# Patient Record
Sex: FEMALE | Race: BLACK OR AFRICAN AMERICAN
[De-identification: names, ages, dates, MRNs, and addresses within clinical notes are randomized per-mention and may not be internally consistent; named-entity substitution may affect disease eponyms.]

---

## 2018-03-25 ENCOUNTER — HOSPITAL ENCOUNTER (OUTPATIENT)
Dept: HOSPITAL 76 - ED | Age: 53
Setting detail: OBSERVATION
LOS: 1 days | Discharge: HOME | End: 2018-03-26
Attending: INTERNAL MEDICINE | Admitting: SPECIALIST
Payer: SELF-PAY

## 2018-03-25 DIAGNOSIS — E78.5: ICD-10-CM

## 2018-03-25 DIAGNOSIS — Z83.3: ICD-10-CM

## 2018-03-25 DIAGNOSIS — Z82.49: ICD-10-CM

## 2018-03-25 DIAGNOSIS — I11.9: ICD-10-CM

## 2018-03-25 DIAGNOSIS — E11.65: ICD-10-CM

## 2018-03-25 DIAGNOSIS — R07.2: Primary | ICD-10-CM

## 2018-03-25 DIAGNOSIS — E11.42: ICD-10-CM

## 2018-03-25 LAB
ALBUMIN DIAFP-MCNC: 4 G/DL (ref 3.2–5.5)
ALBUMIN/GLOB SERPL: 1.1 {RATIO} (ref 1–2.2)
ALP SERPL-CCNC: 112 IU/L (ref 42–121)
ALT SERPL W P-5'-P-CCNC: 19 IU/L (ref 10–60)
ANION GAP SERPL CALCULATED.4IONS-SCNC: 11 MMOL/L (ref 6–13)
AST SERPL W P-5'-P-CCNC: 17 IU/L (ref 10–42)
BASOPHILS # BLD MANUAL: 0 10^3/UL (ref 0–0.1)
BASOPHILS NFR BLD AUTO: 0.8 %
BILIRUB BLD-MCNC: 0.3 MG/DL (ref 0.2–1)
BUN SERPL-MCNC: 14 MG/DL (ref 6–20)
CALCIUM UR-MCNC: 9.9 MG/DL (ref 8.5–10.3)
CHLORIDE SERPL-SCNC: 94 MMOL/L (ref 101–111)
CK MB SERPL-MCNC: 1.2 NG/ML (ref 0.6–6.3)
CK SERPL-CCNC: 81 IU/L (ref 22–269)
CO2 SERPL-SCNC: 29 MMOL/L (ref 21–32)
CREAT SERPLBLD-SCNC: 0.7 MG/DL (ref 0.4–1)
EOSINOPHIL # BLD MANUAL: 0.1 10^3/UL (ref 0–0.7)
EOSINOPHIL NFR BLD AUTO: 2.8 %
ERYTHROCYTE [DISTWIDTH] IN BLOOD BY AUTOMATED COUNT: 13.3 % (ref 12–15)
EST. AVERAGE GLUCOSE BLD GHB EST-MCNC: 361 MG/DL (ref 70–100)
GFRSERPLBLD MDRD-ARVRAT: 87 ML/MIN/{1.73_M2} (ref 89–?)
GLOBULIN SER-MCNC: 3.7 G/DL (ref 2.1–4.2)
GLUCOSE SERPL-MCNC: 459 MG/DL (ref 70–100)
HB2 TOTAL: 14 G/DL
HBA1C BLD-MCNC: 1.85 G/DL
HEMOGLOBIN A1C %: 14.2 % (ref 4.6–6.2)
HGB UR QL STRIP: 13.1 G/DL (ref 12–16)
LIPASE SERPL-CCNC: 57 U/L (ref 22–51)
LYMPH ABN NFR BLD MANUAL: 0 %
LYMPHOBLASTS # BLD: 26 %
LYMPHOCYTES # BLD MANUAL: 3 10^3/UL (ref 1.5–3.5)
LYMPHOCYTES NFR BLD AUTO: 32.1 %
MCH RBC QN AUTO: 29.3 PG (ref 27–31)
MCHC RBC AUTO-ENTMCNC: 33.6 G/DL (ref 32–36)
MCV RBC AUTO: 87.1 FL (ref 81–99)
MONOCYTES # BLD MANUAL: 0.3 10^3/UL (ref 0–1)
MONOCYTES NFR BLD AUTO: 5 %
NEUTROPHILS # SNV AUTO: 9.1 X10^3/UL (ref 4.8–10.8)
NEUTROPHILS NFR BLD AUTO: 59.3 %
NEUTROPHILS NFR BLD MANUAL: 5.7 10^3/UL (ref 1.5–6.6)
NEUTS BAND NFR BLD MANUAL: 63 %
NEUTS BAND NFR BLD: 0 %
PDW BLD AUTO: 8.4 FL (ref 7.9–10.8)
PLAT MORPH BLD: (no result)
PLATELET # BLD: 270 10^3/UL (ref 130–450)
PLATELET BLD QL SMEAR: (no result)
PROT SPEC-MCNC: 7.7 G/DL (ref 6.7–8.2)
RBC MAR: 4.46 10^6/UL (ref 4.2–5.4)
RBC MORPH BLD: (no result)
SODIUM SERPLBLD-SCNC: 134 MMOL/L (ref 135–145)
TROPONIN I SERPL-MCNC: 0.17 NG/ML (ref ?–0.49)

## 2018-03-25 PROCEDURE — 85025 COMPLETE CBC W/AUTO DIFF WBC: CPT

## 2018-03-25 PROCEDURE — 83690 ASSAY OF LIPASE: CPT

## 2018-03-25 PROCEDURE — 71275 CT ANGIOGRAPHY CHEST: CPT

## 2018-03-25 PROCEDURE — 84484 ASSAY OF TROPONIN QUANT: CPT

## 2018-03-25 PROCEDURE — 99285 EMERGENCY DEPT VISIT HI MDM: CPT

## 2018-03-25 PROCEDURE — 82553 CREATINE MB FRACTION: CPT

## 2018-03-25 PROCEDURE — 93005 ELECTROCARDIOGRAM TRACING: CPT

## 2018-03-25 PROCEDURE — 93017 CV STRESS TEST TRACING ONLY: CPT

## 2018-03-25 PROCEDURE — 96374 THER/PROPH/DIAG INJ IV PUSH: CPT

## 2018-03-25 PROCEDURE — 80061 LIPID PANEL: CPT

## 2018-03-25 PROCEDURE — 80048 BASIC METABOLIC PNL TOTAL CA: CPT

## 2018-03-25 PROCEDURE — 83721 ASSAY OF BLOOD LIPOPROTEIN: CPT

## 2018-03-25 PROCEDURE — 96376 TX/PRO/DX INJ SAME DRUG ADON: CPT

## 2018-03-25 PROCEDURE — 99284 EMERGENCY DEPT VISIT MOD MDM: CPT

## 2018-03-25 PROCEDURE — 78452 HT MUSCLE IMAGE SPECT MULT: CPT

## 2018-03-25 PROCEDURE — 82550 ASSAY OF CK (CPK): CPT

## 2018-03-25 PROCEDURE — 96361 HYDRATE IV INFUSION ADD-ON: CPT

## 2018-03-25 PROCEDURE — 96375 TX/PRO/DX INJ NEW DRUG ADDON: CPT

## 2018-03-25 PROCEDURE — 83036 HEMOGLOBIN GLYCOSYLATED A1C: CPT

## 2018-03-25 PROCEDURE — 74174 CTA ABD&PLVS W/CONTRAST: CPT

## 2018-03-25 PROCEDURE — 80053 COMPREHEN METABOLIC PANEL: CPT

## 2018-03-25 PROCEDURE — 36415 COLL VENOUS BLD VENIPUNCTURE: CPT

## 2018-03-25 RX ADMIN — INSULIN GLARGINE SCH UNIT: 100 INJECTION, SOLUTION SUBCUTANEOUS at 21:54

## 2018-03-25 RX ADMIN — INSULIN GLARGINE SCH: 100 INJECTION, SOLUTION SUBCUTANEOUS at 22:04

## 2018-03-25 RX ADMIN — INSULIN ASPART SCH UNIT: 100 INJECTION, SOLUTION INTRAVENOUS; SUBCUTANEOUS at 21:57

## 2018-03-25 RX ADMIN — SODIUM CHLORIDE SCH MLS/HR: 9 INJECTION, SOLUTION INTRAVENOUS at 21:02

## 2018-03-25 NOTE — CT REPORT
EXAM:

CT ANGIOGRAM CHEST

 

EXAM DATE: 3/25/2018 06:45 PM.

 

CLINICAL HISTORY: Chest pain.

 

COMPARISON: None.

 

TECHNIQUE: Routine helical imaging was performed through the chest in the arterial phase. IV Contrast
: 100 cc Isovue 300. Reconstructions: Coronal 3-D MIP reconstructions.Sagittal and coronal.

 

In accordance with CT protocol optimization, one or more of the following dose reduction techniques w
ere utilized for this exam: automated exposure control, adjustment of mA and/or KV based on patient s
ize, or use of iterative reconstructive technique.

 

FINDINGS: 

Thoracic Aorta: The proximal great vessels are unremarkable. Thoracic aorta is normal in caliber with
out evidence of aneurysm or dissection. No periaortic hematoma. Pulmonary artery unremarkable save fo
r some slight air in the pulmonary artery, presumed introduced from injection.

 

Lungs/pleura: No consolidation, nodules, or edema. No effusions or pneumothorax.

 

Mediastinum: The thyroid gland is unremarkable. There are no enlarged mediastinal lymph nodes. No per
icardial effusion. 

 

Other: None.

 

IMPRESSION: Unremarkable CTA chest. No evidence of aneurysm or dissection.

 

[facility]

Referring Provider Line: [phone]

 

SITE ID: [id number]

## 2018-03-25 NOTE — CT REPORT
EXAM:

CT ANGIOGRAM ABDOMEN AND PELVIS WITH CONTRAST

 

EXAM DATE: 3/25/2018 07:13 PM.

 

CLINICAL HISTORY: Chest pain, murmur.

 

COMPARISONS: None.

 

TECHNIQUE: Routine helical CT angiogram imaging was performed through the abdomen and pelvis in the a
rterial phase. IV contrast: 100 cc Isovue-300. Enteric contrast: No. Reconstructions: Coronal, sagitt
al, and 3D MIP reconstructions.

 

In accordance with CT protocol optimization, one or more of the following dose reduction techniques w
ere utilized for this exam: automated exposure control, adjustment of mA and/or KV based on patient s
ize, or use of iterative reconstructive technique.

 

FINDINGS: 

Vasculature: Normal. No aneurysm, dissection, or significant atherosclerotic disease of the abdominal
 aorta and iliac arteries. The visualized mesenteric and solid organ vascular structures are also wit
hin normal limits.

 

Abdominal Solid Organs: Liver is normal in contour without focal intrahepatic lesion. The gallbladder
 is contracted and unremarkable. The spleen and pancreas are within normal limits. Adrenal glands unr
emarkable. Symmetric renal enhancement with mild right pelviectasis. Left renal cyst measuring 12 mm.


 

Peritoneal Cavity: The stomach is unremarkable. There are no dilated loops of large or small intestin
e. No focal inflammation. No enlarged mesenteric lymph nodes. 

 

Pelvic Organs: Moderate distention of the bladder. Uterus and adnexa unremarkable.

 

Bones: No significant abnormality.

 

Other: None.

 

IMPRESSION: 

1. No evidence of abdominal aortic aneurysm or dissection. 

2. Essentially unremarkable CTA of the abdomen and pelvis.

 

[facility]

Referring Provider Line: [phone]

 

SITE ID: [id number]

## 2018-03-25 NOTE — HISTORY & PHYSICAL EXAMINATION
Chief Complaint





- Chief Complaint


Chief Complaint: chest pain and burning feet





History of Present Illness





- Admitted From


Admitted From:: ER/Home





- History Obtained From


Records Reviewed: [facility]


History obtained from: Patient, Her , and Dr. Lobo


Exam Limitations: none





- History of Present Illness


HPI Comment/Other: 


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 

is a traveling nurse.  She has been on [facility] about 3 or 4 weeks, 

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  

[place]  She [place] not have a primary care provider.  The 

last time she was seen by physician was in the 2017, she thinks it was 

October.





3 years ago, she had already lost 60-70 pounds in an attempt to control her 

blood pressure and her diabetes.  At that time she was on hydrochlorothiazide, 

metformin, and potassium.  With the weight loss, her A1c became normal, and her 

medications were stopped.  Her sugars have been checked only on a yearly basis 

with her physician, and her last 2017 glucose was normal.





In the last few weeks she has developed a burning dysesthesia of her feet.  As 

a nurse, she knew she was dealing with peripheral neuropathy.  She did not 

lengthen neuropathy with her diabetes.  She has not had polyuria, polyphagia, 

polydipsia.





She started developing some left arm aching yesterday.  And today, 45 minutes 

before coming to the hospital, she developed substernal chest pressure.  It was 

nonradiating.  There was no shortness of breath, nausea, diaphoresis, or 

dizziness.  No palpitations.  She did have an episode of chest pain 3 years 

ago.  She was observed overnight, but no stress test was done.





She came to the emergency room and was noted to be hypertensive at 210 

systolic.  She received metoprolol 25 mg p.o., after receiving metoprolol 5 mg 

once.  Her systolic went from 215/110 diastolic to 167/93.  Chest pressure was 

gone by the time she got to the emergency room.  Left arm ache was gone.  Her 

initial troponin was 0.17.  Repeat troponin an hour later was 0.15.  Random 

glucose was 459.  A1c is 14.2%.





She is now placed in observation for rule out MI.  She has significant risk 

factors including hyperlipidemia, hypertension, diabetes, all of which are 

uncontrolled.  She is not a smoker.  She does have a family history with mom 

and dad both having cardiac issues and one brother dying of a massive heart 

attack.  She is in nondrinker as well. 








History





- Past Medical History


Cardiovascular: reports: Hypertension, High cholesterol, Other (chest pain 3 

years ago with overnight stay in hospital but no ETT)


Respiratory: reports: None


Neuro: reports: None


Endocrine/Autoimmune: reports: Type 2 diabetes


GI: reports: None (never had a colonoscopy)


GYN: reports: Other (-0-1-3)


: reports: None


HEENT: reports: Other (wears glasses)


Psych: reports: None


Musculoskeletal: reports: None


Derm: reports: None


MRSA Hx?: No





- Family & Social History


Family History Comment/Other: Dad and Mom both  in their late 60's. They 

had DM,HTN, chol, CAD at their deaths. She had 18 siblings. 3 sisters have  

of cancer; 1 was breast but the other two she doesn't know what kind of cancer. 

1 brother  of GSW. 1 brother  of MI. Her 3 children are healthy


Living arrangement: At home


Living Situation: With spouse/s.o.


Social History Notes: she was [place] in [facility] and has lived all over the 

[facility] bc of work and family. She has been  to her 2nd  for 17 

years. They met at her cousins house one night for dinner on a blind date and 

have been together even since. Her 3 children live in [place] [facility]. She never 

smoked, and never really even socially drank since she doesn't like the taste 

of it. Nurse for 30 years now and she and her  do 3-4 months at a time 

in different locations. [place] went [place] and now plan on 

being in [place]





- Substance History


Use: Uses substance without health or social issues: NONE


Abuse: Recurrent use of substance despite neg consequences: NONE


Dependence: Experiences withdrawal or developed tolerances: NONE





- POLST


Patient has POLST: No


POLST Status: Full Code





Meds/Allgy





- Home Medications


Home Medications: 


 Ambulatory Orders











 Medication  Instructions  Recorded  Confirmed


 


Aspirin  18 














- Allergies


Allergies/Adverse Reactions: 


 Allergies











Allergy/AdvReac Type Severity Reaction Status Date / Time


 


No Known Drug Allergies Allergy   Verified 18 17:54














Review of Systems





- Constitutional


Constitutional: denies: Fatigue, Fever, Chills, Malaise, Weight gain, Weight 

loss





- Eyes


Eyes: denies: Pain, Irritation, Blurred vision, Spots in vision, Field loss





- Ears, Nose & Throat


Ears, Nose & Throat: denies: Ear pain, Hearing loss, Hearing aids, Tinnitus, 

Vertigo, Nasal congestion, Mouth lesions





- Cardiovascular


Cariovascular: reports: Chest pain.  denies: Irregular heart rate, Palpitations

, Edema, Lightheadedness, Syncope, Exertional dyspnea, Decr. exercise tolerance





- Respiratory


Respiratory: denies: Cough, Sputum production, Wheezing, Snoring





- Gastrointestinal


Gastrointestinal: reports: Other (has never had a colonscopy).  denies: 

Abdominal pain, Abdominal distention, Constipation, Diarrhea, Change in bowel 

habits





- Genitourinary


Genitourinary: denies: Dysuria, Frequency, Urgency, Hematuria





- Musculoskeletal


Musculoskeletal: reports: Other (" I dance on tables and feel great!").  denies

: Muscle pain, Back pain, Muscle aches, Stiffness





- Integumentary


Integumentary: denies: Rash, Pruritis, Lesions, Dryness





- Neurological


Neurological: reports: Numbness (of feet these last few weeks).  denies: 

General weakness, Focal weakness, Headache, Dizziness, Abnormal gait





- Psychiatric


Psychiatric: denies: Depression, Anxiety, Suicidal





- Endocrine


Endocrine: denies: Polyuria, Polydypsia, Polyphagia, Intolerance to cold





- Hematologic/Lymphatic


Hematologic/Lymphatic: denies: Anemia, Bruising, Petechiae





Exam





- Vital Signs


Reviewed Vital Signs: Yes


Vital Signs: 





 Vital Signs x48h











  Temp Pulse Pulse Resp BP BP Pulse Ox


 


 18 20:54  37 C   63    179/86 H  100


 


 18 20:05   65   14  158/81 H   100


 


 18 19:53   78   18  167/93 H   100


 


 18 18:27   77   18  174/94 H   99


 


 18 17:50  37.1 C  101 H   17  215/110 H   98














- Physical Exam


General Appearance: positive: No acute distress, Alert, Other (Cheerful, black 

female who looks younger than stated age and  at the bedside)


Eyes Bilateral: positive: PERRL, EOMI


ENT: positive: Pharynx nml


Neck: positive: No JVD.  negative: Stiff neck, Carotid bruit


Respiratory: positive: Chest non-tender.  negative: Wheezes, Rales, Rhonchi


Cardiovascular: positive: Regular rate & rhythm.  negative: Systolic murmur, 

Gallop/S4, Friction rub


Peripheral Pulses: positive: 2+


Abdomen: positive: Non-tender, No organomegaly, Nml bowel sounds, No distention


Skin: positive: Warm, Dry.  negative: Diaphoresis


Extremities: positive: Non-tender, Full ROM, No pedal edema


Neurologic/Psychiatric: positive: Oriented x3, CN's nml (2-12), Motor nml, 

Sensation nml, Mood/affect nml





Conclusion/Plan





- Problem List


(1) Chest pain


Conclusion/Plan: 


In a patient who has all the risk factors for coronary artery disease: Diabetes

, hypertension, hyperlipidemia, family history.  First set of troponins show 

enzyme leak.  May be from the severe hypertension.





Plan:


Place in observation


Cardiac enzymes every 6 hours 2 more sets


ECHO in am


Check fasting lipid panel and start on a statin


Aspirin daily


Add lisinopril for blood pressure control


Lexiscan stress test tomorrow a.m.


If her next set of troponins are higher, I did explain that I may be calling 

[facility] for possible transfer.  She and her  are both 

aware.


Subungual nitroglycerin as needed chest pain with morphine as needed chest pain


Qualifiers: 


   Chest pain type: unspecified   Qualified Code(s): R07.9 - Chest pain, 

unspecified   





(2) Uncontrolled type 2 diabetes mellitus with complication, without long-term 

current use of insulin


Conclusion/Plan: 


she really wants to do oral meds. But I suspect with a glucose of >400 and an 

A1c that is this high oral meds alone won't get her to her goal of <7% A1c. 


I will start lantus and SS novolog but will also start amaryl. 


she will need to establish herself w a PCP and then negotiate what her meds 

will be but for now it's insulin + oral meds.


already has complication of peripheral neuropathy.


will check TSH in case. 








(3) Essential hypertension with goal blood pressure less than 130/80


Conclusion/Plan: 


she will be started on lisinopril bc of the DM. She may end of needing two 

meds. 


I don't know if her previous PCP ever looks for renal artery hyperplasia 


that can be done as an outpt. 


Again, will need to be seen by a new PCP in the next 1-2 weeks after discharge. 








- Lab Results


Lab results reviewed: Yes


Fish Bones: 


 18 18:06





 18 18:06





- Diagnostic Imaging Results


Diagnostic Imaging Results: positive: Final report reviewed





- EKG Results


EKG Interpreted Independently: Yes





Core Measures





- Anticipated LOS


I expect patient to be DC'd or transferred within 96 hours.: Yes





- DVT/VTE - Prophylaxis


VTE/DVT Device ordered at admit?: Yes





- AMI - Statin at Admit


Aspirin Prescribed on Admit: Yes

## 2018-03-25 NOTE — ED PHYSICIAN DOCUMENTATION
PD HPI CHEST PAIN





- Stated complaint


Stated Complaint: CP/FEET HAND NUMB





- Chief complaint


Chief Complaint: Cardiac





- History obtained from


History obtained from: Patient





- History of Present Illness


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 

somewhat painful, over the last day she has had in the left arm as well and 

about 45 minutes ago developed moderate intensity substernal chest pressure 

which is nonradiating and not associated with shortness of breath, dizziness, 

sweats, or nausea but she does have a diminished appetite.  She has no personal 

Of coronary disease or other issues.  She has had elevated blood pressures 

before at times intermittently but usually it is actually on the low side.)





- Additional information


Additional information: 





Several years ago she was on metformin and hydrochlorothiazide but lost weight 

and did not need to be on them anymore.





Review of Systems


Ten Systems: 10 systems reviewed and negative


Constitutional: denies: Fever, Chills


Cardiac: reports: Chest pain / pressure.  denies: Palpitations, Pedal edema, 

Calf pain


Respiratory: denies: Dyspnea, Cough


GI: denies: Abdominal Pain, Nausea, Vomiting





PD PAST MEDICAL HISTORY





- Past Medical History


Past Medical History: No





- Past Surgical History


Past Surgical History: No





- Present Medications


Home Medications: 


 Ambulatory Orders











 Medication  Instructions  Recorded  Confirmed


 


Aspirin  03/25/18 














- Allergies


Allergies/Adverse Reactions: 


 Allergies











Allergy/AdvReac Type Severity Reaction Status Date / Time


 


No Known Drug Allergies Allergy   Verified 03/25/18 17:54














- Social History


Does the pt smoke?: No


Smoking Status: Never smoker





- Family History


Family history: reports: DM (and HTN)





PD ED PE NORMAL





- Vitals


Vital signs reviewed: Yes





- General


General: Alert and oriented X 3, No acute distress





- HEENT


HEENT: PERRL, EOMI





- Neck


Neck: Supple, no meningeal sign, No bony TTP





- Cardiac


Cardiac: RRR, Other (2 out of 6 decrescendo holosystolic murmur heard best at 

the left upper sternal border)





- Respiratory


Respiratory: No respiratory distress, Clear bilaterally





- Abdomen


Abdomen: Normal bowel sounds, Soft, Non tender





- Derm


Derm: Normal color, Warm and dry





- Extremities


Extremities: No edema, No calf tenderness / cord, Other (Equal and strong pedal 

and radial pulses bilaterally)





- Neuro


Neuro: Alert and oriented X 3, Normal speech





- Psych


Psych: Normal mood, Normal affect





Results





- Vitals


Vitals: 


 Vital Signs - 24 hr











  03/25/18 03/25/18





  17:50 18:27


 


Temperature 37.1 C 


 


Heart Rate 101 H 77


 


Respiratory 17 18





Rate  


 


Blood Pressure 215/110 H 174/94 H


 


O2 Saturation 98 99








 Oxygen











O2 Source                      Room air

















- EKG (time done)


  ** 1754


Rate: Rate (enter#) (90)


Rhythm: NSR


Axis: Normal


QRS: LVH


Ischemia: Normal ST segments


Computer interpretation: Agree with computer





- Labs


Labs: 


 Laboratory Tests











  03/25/18 03/25/18 03/25/18





  18:06 18:06 18:06


 


WBC  9.1  


 


RBC  4.46  


 


Hgb  13.1  


 


Hct  38.9  


 


MCV  87.1  


 


MCH  29.3  


 


MCHC  33.6  


 


RDW  13.3  


 


Plt Count  270  


 


MPV  8.4  


 


Neut #  Not Reportable  


 


Lymph #  Not Reportable  


 


Mono #  Not Reportable  


 


Eos #  Not Reportable  


 


Baso #  Not Reportable  


 


Absolute Nucleated RBC  Not Reportable  


 


Total Counted  100  


 


Band Neuts % (Manual)  0  


 


Reactive Lymphs % (Man)  7  


 


Abnorm Lymph % (Manual)  0  


 


Nucleated RBC %  Not Reportable  


 


Neutrophils # (Manual)  5.7  


 


Lymphocytes # (Manual)  3.0  


 


Monocytes # (Manual)  0.3  


 


Eosinophils # (Manual)  0.1  


 


Basophils # (Manual)  0.0  


 


Manual Slide Review  Indicated  


 


Platelet Estimate  NORMAL (130-450,000)  


 


Platelet Morphology  NORMAL APPEARANCE  


 


RBC Morph Micro Appear  NORMAL APPEARANCE  


 


Sodium   134 L 


 


Potassium   3.8 


 


Chloride   94 L 


 


Carbon Dioxide   29 


 


Anion Gap   11.0 


 


BUN   14 


 


Creatinine   0.7 


 


Estimated GFR (MDRD)   87 L 


 


Glucose   459 H 


 


Calcium   9.9 


 


Total Bilirubin   0.3 


 


AST   17 


 


ALT   19 


 


Alkaline Phosphatase   112 


 


Total Creatine Kinase   81 


 


CK-MB (CK-2)    1.2


 


Troponin I    0.17


 


Total Protein   7.7 


 


Albumin   4.0 


 


Globulin   3.7 


 


Albumin/Globulin Ratio   1.1 


 


Lipase   57 H 














  03/25/18





  19:10


 


WBC 


 


RBC 


 


Hgb 


 


Hct 


 


MCV 


 


MCH 


 


MCHC 


 


RDW 


 


Plt Count 


 


MPV 


 


Neut # 


 


Lymph # 


 


Mono # 


 


Eos # 


 


Baso # 


 


Absolute Nucleated RBC 


 


Total Counted 


 


Band Neuts % (Manual) 


 


Reactive Lymphs % (Man) 


 


Abnorm Lymph % (Manual) 


 


Nucleated RBC % 


 


Neutrophils # (Manual) 


 


Lymphocytes # (Manual) 


 


Monocytes # (Manual) 


 


Eosinophils # (Manual) 


 


Basophils # (Manual) 


 


Manual Slide Review 


 


Platelet Estimate 


 


Platelet Morphology 


 


RBC Morph Micro Appear 


 


Sodium 


 


Potassium 


 


Chloride 


 


Carbon Dioxide 


 


Anion Gap 


 


BUN 


 


Creatinine 


 


Estimated GFR (MDRD) 


 


Glucose 


 


Calcium 


 


Total Bilirubin 


 


AST 


 


ALT 


 


Alkaline Phosphatase 


 


Total Creatine Kinase 


 


CK-MB (CK-2) 


 


Troponin I  0.15


 


Total Protein 


 


Albumin 


 


Globulin 


 


Albumin/Globulin Ratio 


 


Lipase 














- Rads (name of study)


  ** CT Angio


Radiology: EMP read contemporaneously





PD MEDICAL DECISION MAKING





- ED course


ED course: 





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 

diabetes and hypertension presents with neuropathy in the feet and now acute 

chest pain with a nonischemic EKG but fairly high blood pressure.  Blood 

pressure came down to about 160-170 systolic without specific intervention. She 

was administered aspirin.  Because of the neurologic symptoms and angiogram of 

the chest was done, also the murmur, this was negative.  Her troponin was in 

her intermediate at 0.17 and this was repeated in short order to make sure was 

not shooting up, the second 1 is only 0.15.  Her blood sugar is fairly high as 

well.  She was administered IV fluids, insulin, beta blockers.  She needs a 

more formal rule out, but her troponin anemia may be due to her blood pressure.

  Spoke with Dr. Lutz for observation at 7:51 PM.





Departure





- Departure


Disposition: ED Place in Observation


Clinical Impression: 


 Elevated troponin





Chest pain


Qualifiers:


 Chest pain type: unspecified Qualified Code(s): R07.9 - Chest pain, unspecified





Hypertension


Qualifiers:


 Hypertension type: unspecified Qualified Code(s): I10 - Essential (primary) 

hypertension





Uncontrolled diabetes mellitus


Qualifiers:


 Diabetes mellitus type: type 2 Diabetes mellitus complication status: with 

hyperglycemia Diabetes mellitus long term insulin use: without long term use 

Qualified Code(s): E11.65 - Type 2 diabetes mellitus with hyperglycemia





Condition: Stable

## 2018-03-25 NOTE — CT PRELIMINARY REPORT
Accession: [id number]

Exam: CT ABDOMEN/PELVIS ANGIO

 

IMPRESSION: 

1. No evidence of abdominal aortic aneurysm or dissection. 

2. Essentially unremarkable CTA of the abdomen and pelvis.

 

[facility]

 

SITE ID: [id number]

## 2018-03-26 VITALS — SYSTOLIC BLOOD PRESSURE: 157 MMHG | DIASTOLIC BLOOD PRESSURE: 78 MMHG

## 2018-03-26 LAB
CHOLEST SERPL-MCNC: 227 MG/DL
HDLC SERPL-MCNC: 42 MG/DL
HDLC SERPL: 5.4 {RATIO} (ref ?–4.4)
LDLC SERPL CALC-MCNC: 152 MG/DL
LDLC/HDLC SERPL: 3.6 {RATIO} (ref ?–4.4)
VLDLC SERPL-SCNC: 33 MG/DL

## 2018-03-26 RX ADMIN — SODIUM CHLORIDE SCH MLS/HR: 9 INJECTION, SOLUTION INTRAVENOUS at 14:42

## 2018-03-26 RX ADMIN — ACETAMINOPHEN PRN MG: 325 TABLET ORAL at 07:57

## 2018-03-26 RX ADMIN — SODIUM CHLORIDE, PRESERVATIVE FREE SCH: 5 INJECTION INTRAVENOUS at 08:25

## 2018-03-26 RX ADMIN — INSULIN ASPART SCH UNIT: 100 INJECTION, SOLUTION INTRAVENOUS; SUBCUTANEOUS at 08:19

## 2018-03-26 RX ADMIN — SODIUM CHLORIDE, PRESERVATIVE FREE SCH: 5 INJECTION INTRAVENOUS at 17:17

## 2018-03-26 RX ADMIN — INSULIN ASPART SCH: 100 INJECTION, SOLUTION INTRAVENOUS; SUBCUTANEOUS at 17:17

## 2018-03-26 RX ADMIN — INSULIN ASPART SCH: 100 INJECTION, SOLUTION INTRAVENOUS; SUBCUTANEOUS at 12:26

## 2018-03-26 RX ADMIN — SODIUM CHLORIDE SCH MLS/HR: 9 INJECTION, SOLUTION INTRAVENOUS at 03:47

## 2018-03-26 RX ADMIN — MORPHINE SULFATE PRN MG: 2 INJECTION, SOLUTION INTRAMUSCULAR; INTRAVENOUS at 16:25

## 2018-03-26 RX ADMIN — MORPHINE SULFATE PRN MG: 2 INJECTION, SOLUTION INTRAMUSCULAR; INTRAVENOUS at 00:43

## 2018-03-26 RX ADMIN — ACETAMINOPHEN PRN MG: 325 TABLET ORAL at 16:25

## 2018-03-26 RX ADMIN — SODIUM CHLORIDE, PRESERVATIVE FREE SCH ML: 5 INJECTION INTRAVENOUS at 00:10

## 2018-03-26 NOTE — DISCHARGE SUMMARY
Discharge Summary


Admit Date: 03/25/18


Discharge Date: 03/26/18


Discharging Provider: Steffen Galindo MD


Primary Care Provider: None


Code Status: Attempt Resuscitation


Condition at Discharge: Fair


Discharge Disposition: 01 Home, Self Care





- DIAGNOSES


Admission Diagnoses: 





1.  Chest pain


2.  Uncontrolled type 2 diabetes mellitus with complication, without long-term 

current use of insulin


3.  Essential hypertension with goal blood pressure less than 130/80


Discharge Diagnoses with Status of Each Condition: 





1.  Chest pain: Stable


2.  Uncontrolled type 2 diabetes mellitus with complication, without long-term 

current use of insulin: Uncontrolled


3.  Essential hypertension with goal blood pressure less than 130/80: 

Uncontrolled


4.  Hyperlipidemia: Stable





- HPI


History of Present Illness: 





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 

is a traveling nurse.  She has been on [facility] about 3 or 4 weeks, 

currently contracted with [facility] doing LTAC ventilator nursing management[place]  

[place]  She [place] not have a primary care provider.  The 

last time she was seen by physician was in the fall 2017, she thinks it was 

October.





3 years ago, she had already lost 60-70 pounds in an attempt to control her 

blood pressure and her diabetes.  At that time she was on hydrochlorothiazide, 

metformin, and potassium.  With the weight loss, her A1c became normal, and her 

medications were stopped.  Her sugars have been checked only on a yearly basis 

with her physician, and her last October 2017 glucose was normal.





In the last few weeks she has developed a burning dysesthesia of her feet.  As 

a nurse, she knew she was dealing with peripheral neuropathy.  She did not 

lengthen neuropathy with her diabetes.  She has not had polyuria, polyphagia, 

polydipsia.





She started developing some left arm aching yesterday.  And today, 45 minutes 

before coming to the hospital, she developed substernal chest pressure.  It was 

nonradiating.  There was no shortness of breath, nausea, diaphoresis, or 

dizziness.  No palpitations.  She did have an episode of chest pain 3 years 

ago.  She was observed overnight, but no stress test was done.





She came to the emergency room and was noted to be hypertensive at 210 

systolic.  She received metoprolol 25 mg p.o., after receiving metoprolol 5 mg 

once.  Her systolic went from 215/110 diastolic to 167/93.  Chest pressure was 

gone by the time she got to the emergency room.  Left arm ache was gone.  Her 

initial troponin was 0.17.  Repeat troponin an hour later was 0.15.  Random 

glucose was 459.  A1c is 14.2%.





She is now placed in observation for rule out MI.  She has significant risk 

factors including hyperlipidemia, hypertension, diabetes, all of which are 

uncontrolled.  She is not a smoker.  She does have a family history with mom 

and dad both having cardiac issues and one brother dying of a massive heart 

attack.  She is in nondrinker as well. 











- HOSPITAL COURSE


Hospital Course: 





Patient was admitted with chest pain her initial troponin was 0 0.17 and 

subsequent repeat troponins were 0.15.  The patient's EKG did not show any ST 

elevations or acute ischemic changes did show left ventricular hypertrophy.  On 

presentation patient was found to have uncontrolled hypertension with blood 

pressure of 215/110.  Given the patient's risk factors of diabetes, hypertension

, hyperlipidemia and family history the patient underwent a stress test.  The 

patient underwent a Lexiscan EKG stress test which was inconclusive.  The 

patient did have chest pain during the test however there was no significant 

changes on her EKG.  The patient also underwent a myocardial perfusion scan 

which showed no convincing reversible perfusion deficit to indicate stress-

induced ischemia.  There was no convincing fixed perfusion deficits.  In the 

left ventricular ejection fraction was 61%.  The patient was discharged home 

with a prescription for metformin, glipizide, nifedipine, Cozaar and Lipitor.  

The patient's hemoglobin A1c was significantly elevated at 14.2.  The patient 

however was not ready to start on insulin therefore we did prescribe metformin 

and glipizide.  The patient will follow up with a primary care physician and 

will likely need to be started on insulin in the next couple of months.  The 

patient was given a prescription for a glucometer and glucometer strips.  The 

patient's blood pressure was better controlled when she was placed on blood 

pressure medications.  The patient's LDL was elevated at 152 given her risk 

factors she was placed on Lipitor.  It was stressed to the patient that is very 

important for her to get her medical problems controlled and to be very 

compliant with medications, diet and exercise.  The patient seemed to 

understand this and seemed motivated to do well.





- ALLERGIES


Allergies/Adverse Reactions: 


 Allergies











Allergy/AdvReac Type Severity Reaction Status Date / Time


 


No Known Drug Allergies Allergy   Verified 03/25/18 17:54














- MEDICATIONS


Home Medications: 


 Ambulatory Orders











 Medication  Instructions  Recorded  Confirmed


 


Aspirin 81 mg PO DAILY 03/25/18 03/26/18


 


Atorvastatin Calcium [Lipitor] 80 mg PO QPM #30 tablet 03/26/18 


 


Blood Sugar Diagnostic [Glucometer 1 each  TIDWM #90 strip 03/26/18 





Strips]   


 


Blood-Glucose Meter [Glucometer] 1 each  TIDWM #1 each 03/26/18 


 


Glimepiride [Amaryl] 2 mg PO DAILYWM #30 tablet 03/26/18 


 


Losartan [Cozaar] 50 mg PO DAILY #30 tablet 03/26/18 


 


Multivitamin [Theragran] 1 tab PO DAILY 03/26/18 03/26/18


 


NIFEdipine [Nifedipine ER] 30 mg PO DAILY #30 tablet.er 03/26/18 


 


metFORMIN [Glucophage] 850 mg PO TIDWM #90 tablet 03/26/18 














- PHYSICAL EXAM AT DISCHARGE


General Appearance: positive: No acute distress, Alert


Eyes Bilateral: positive: Normal inspection, PERRL, EOMI, No lid inflammation, 

Conjunctivae nml, No scleral icterus


ENT: positive: ENT inspection nml, Pharynx nml, No signs of dehydration.  

negative: Purulent nasal drainage, Pharyngeal erythema, Oral lesions


Neck: positive: Nml inspection, Thyroid nml, No JVD, Trachea midline.  negative

: Thyromegaly, Lymphadenopathy (R), Lymphadenopathy (L), Stiff neck, Carotid 

bruit, Tracheal deviation


Respiratory: positive: Chest non-tender, No respiratory distress, Breath sounds 

nml.  negative: Wheezes, Rales, Rhonchi


Cardiovascular: positive: Regular rate & rhythm, No murmur, No gallop


Peripheral Pulses: positive: 2+


Abdomen: positive: Non-tender, No organomegaly, Nml bowel sounds, No 

distention.  negative: Guarding, Rebound, Hepatomegaly


Skin: positive: Color nml, No rash, Warm


Extremities: positive: Non-tender, Full ROM, Nml appearance, No pedal edema


Neurologic/Psychiatric: positive: Oriented x3, CN's nml (2-12), Motor nml, 

Sensation nml





- LABS


Result Diagrams: 


 03/25/18 18:06





 03/25/18 18:06


Other Lab Results: 








 Laboratory Results











WBC  9.1 x10^3/uL (4.8-10.8)   03/25/18  18:06    


 


RBC  4.46 10^6/uL (4.20-5.40)   03/25/18  18:06    


 


Hgb  13.1 g/dL (12.0-16.0)   03/25/18  18:06    


 


Hct  38.9 % (37.0-47.0)   03/25/18  18:06    


 


MCV  87.1 fL (81.0-99.0)   03/25/18  18:06    


 


MCH  29.3 pg (27.0-31.0)   03/25/18  18:06    


 


MCHC  33.6 g/dL (32.0-36.0)   03/25/18  18:06    


 


RDW  13.3 % (12.0-15.0)   03/25/18  18:06    


 


Plt Count  270 10^3/uL (130-450)   03/25/18  18:06    


 


MPV  8.4 fL (7.9-10.8)   03/25/18  18:06    


 


Neut #  Not Reportable   03/25/18  18:06    


 


Lymph #  Not Reportable   03/25/18  18:06    


 


Mono #  Not Reportable   03/25/18  18:06    


 


Eos #  Not Reportable   03/25/18  18:06    


 


Baso #  Not Reportable   03/25/18  18:06    


 


Absolute Nucleated RBC  Not Reportable   03/25/18  18:06    


 


Total Counted  100   03/25/18  18:06    


 


Band Neuts % (Manual)  0 % (0-10)  03/25/18  18:06    


 


Reactive Lymphs % (Man)  7 %  03/25/18  18:06    


 


Abnorm Lymph % (Manual)  0 %  03/25/18  18:06    


 


Nucleated RBC %  Not Reportable   03/25/18  18:06    


 


Neutrophils # (Manual)  5.7 10^3/uL (1.5-6.6)   03/25/18  18:06    


 


Lymphocytes # (Manual)  3.0 10^3/uL (1.5-3.5)   03/25/18  18:06    


 


Monocytes # (Manual)  0.3 10^3/uL (0.0-1.0)   03/25/18  18:06    


 


Eosinophils # (Manual)  0.1 10^3/uL (0-0.7)   03/25/18  18:06    


 


Basophils # (Manual)  0.0 10^3/uL (0-0.1)   03/25/18  18:06    


 


Manual Slide Review  Indicated   03/25/18  18:06    


 


Platelet Estimate  NORMAL (130-450,000)  (NORMAL)   03/25/18  18:06    


 


Platelet Morphology  NORMAL APPEARANCE  (NORMAL)   03/25/18  18:06    


 


RBC Morph Micro Appear  NORMAL APPEARANCE  (NORMAL)   03/25/18  18:06    


 


Sodium  134 mmol/L (135-145)  L  03/25/18  18:06    


 


Potassium  3.8 mmol/L (3.5-5.0)   03/25/18  18:06    


 


Chloride  94 mmol/L (101-111)  L  03/25/18  18:06    


 


Carbon Dioxide  29 mmol/L (21-32)   03/25/18  18:06    


 


Anion Gap  11.0  (6-13)   03/25/18  18:06    


 


BUN  14 mg/dL (6-20)   03/25/18  18:06    


 


Creatinine  0.7 mg/dL (0.4-1.0)   03/25/18  18:06    


 


Estimated GFR (MDRD)  87  (>89)  L  03/25/18  18:06    


 


Glucose  459 mg/dL ()  H  03/25/18  18:06    


 


POC Whole Bld Glucose  134 mg/dL (70 - 100)  H  03/26/18  12:06    


 


Glycated Hemoglobin  14.2 % (4.6-6.2)  H  03/25/18  18:00    


 


Estim Average Glucose  361  ()  H  03/25/18  18:00    


 


Calcium  9.9 mg/dL (8.5-10.3)   03/25/18  18:06    


 


Total Bilirubin  0.3 mg/dL (0.2-1.0)   03/25/18  18:06    


 


AST  17 IU/L (10-42)   03/25/18  18:06    


 


ALT  19 IU/L (10-60)   03/25/18  18:06    


 


Alkaline Phosphatase  112 IU/L ()   03/25/18  18:06    


 


Total Creatine Kinase  81 IU/L ()   03/25/18  18:06    


 


CK-MB (CK-2)  1.2 ng/mL (0.6-6.3)   03/25/18  18:06    


 


Troponin I  0.15 ng/mL (<0.49)   03/26/18  07:16    


 


Total Protein  7.7 g/dL (6.7-8.2)   03/25/18  18:06    


 


Albumin  4.0 g/dL (3.2-5.5)   03/25/18  18:06    


 


Globulin  3.7 g/dL (2.1-4.2)   03/25/18  18:06    


 


Albumin/Globulin Ratio  1.1  (1.0-2.2)   03/25/18  18:06    


 


Triglycerides  165 mg/dL (-149) H  03/26/18  07:16    


 


Cholesterol  227 mg/dL (-199) H  03/26/18  07:16    


 


LDL Cholesterol, Calc  152 mg/dL (-129) H  03/26/18  07:16    


 


VLDL Cholesterol  33 mg/dL  03/26/18  07:16    


 


HDL Cholesterol  42 mg/dL (60-) L  03/26/18  07:16    


 


LDL/HDL Ratio  3.6  (<4.4)   03/26/18  07:16    


 


Cholesterol/HDL Ratio  5.4  (<4.4)   03/26/18  07:16    


 


Lipase  57 U/L (22-51)  H  03/25/18  18:06    














- DIAGNOSTIC IMAGING


Diagnostic Imaging Results: Final report reviewed


Diagnostic Imaging Results Comments: 





EXAM: [id number] NM/MPS ([id number]) 


EXAM: 


SINGLE-ISOTOPE PHARMACOLOGICAL STRESS TEST WITH LEXISCAN. SINGLE-ISOTOPE AND 

SAME-DAY REST/STRESS 


MYOCARDIAL PERFUSION SCANS WITH TOMOGRAPHIC IMAGING, QUANTITATIVE ANALYSIS, 

WALL MOTION ANALYSIS 


AND CALCULATION OF EJECTION FRACTION. 





EXAM DATE: 03/26/2018 1327 hrs. 





CLINICAL HISTORY: CHEST PAIN. 





COMPARISON: None. 





TECHNIQUE: Following the intravenous administration of 10.3 mCi of Tc-99m 

sestamibi, a rest 


myocardial perfusion scan was done with tomography. Motion correction was 

applied when appropriate. 





After a delay of several hours on the same day, a pharmacological stress was 

performed with the 


infusion of 0.4 mg of Lexiscan. According to protocol, 41.8 mCi of Tc-99m 

sestamibi was injected 


for stress myocardial perfusion scan. Stress myocardial perfusion was done with 

tomography without 


attenuation correction. Motion correction was applied when appropriate. 





Gated tomographic images were obtained for wall motion analysis and computation 

of left 


ventricular ejection fraction. 





FINDINGS: 


Perfusion images: 


Left ventricular chamber size is normal at rest and unchanged at stress. 


No convincing fixed perfusion deficits. 


No convincing reversible perfusion deficits. 





Gated images: 


No convincing focal wall motion abnormality. 


The left ventricular ejection fraction is estimated at 61% (normal > 50%). 





IMPRESSION: 


1. No convincing reversible perfusion deficits to indicate stress-induced 

ischemia. 


2. No convincing fixed perfusion deficits. 


3. Left ventricular ejection fraction of 61% (normal > 50%).





EXAM: [id number] CT/TAN ([id number]) 


EXAM: 


CT ANGIOGRAM CHEST 





EXAM DATE: 3/25/2018 06:45 PM. 





CLINICAL HISTORY: Chest pain. 





COMPARISON: None. 





TECHNIQUE: Routine helical imaging was performed through the chest in the 

arterial phase. IV 


Contrast: 100 cc Isovue 300. Reconstructions: Coronal 3-D MIP 

reconstructions.Sagittal and coronal. 





In accordance with CT protocol optimization, one or more of the following dose 

reduction 


techniques were utilized for this exam: automated exposure control, adjustment 

of mA and/or KV 


based on patient size, or use of iterative reconstructive technique. 





FINDINGS: 


Thoracic Aorta: The proximal great vessels are unremarkable. Thoracic aorta is 

normal in caliber 


without evidence of aneurysm or dissection. No periaortic hematoma. Pulmonary 

artery unremarkable 


save for some slight air in the pulmonary artery, presumed introduced from 

injection. 





Lungs/pleura: No consolidation, nodules, or edema. No effusions or 

pneumothorax. 





Mediastinum: The thyroid gland is unremarkable. There are no enlarged 

mediastinal lymph nodes. No 


pericardial effusion. 





Other: None. 





IMPRESSION: Unremarkable CTA chest. No evidence of aneurysm or dissection. 





EXAM: [id number] CT/ABPEANG ([id number]) 


EXAM: 


CT ANGIOGRAM ABDOMEN AND PELVIS WITH CONTRAST 





EXAM DATE: 3/25/2018 07:13 PM. 





CLINICAL HISTORY: Chest pain, murmur. 





COMPARISONS: None. 





TECHNIQUE: Routine helical CT angiogram imaging was performed through the 

abdomen and pelvis in 


the arterial phase. IV contrast: 100 cc Isovue-300. Enteric contrast: No. 

Reconstructions: Coronal, 


sagittal, and 3D MIP reconstructions. 





In accordance with CT protocol optimization, one or more of the following dose 

reduction 


techniques were utilized for this exam: automated exposure control, adjustment 

of mA and/or KV 


based on patient size, or use of iterative reconstructive technique. 





FINDINGS: 


Vasculature: Normal. No aneurysm, dissection, or significant atherosclerotic 

disease of the 


abdominal aorta and iliac arteries. The visualized mesenteric and solid organ 

vascular structures 


are also within normal limits. 





Abdominal Solid Organs: Liver is normal in contour without focal intrahepatic 

lesion. The 


gallbladder is contracted and unremarkable. The spleen and pancreas are within 

normal limits. 


Adrenal glands unremarkable. Symmetric renal enhancement with mild right 

pelviectasis. Left renal 


cyst measuring 12 mm. 





Peritoneal Cavity: The stomach is unremarkable. There are no dilated loops of 

large or small 


intestine. No focal inflammation. No enlarged mesenteric lymph nodes. 





Pelvic Organs: Moderate distention of the bladder. Uterus and adnexa 

unremarkable. 





Bones: No significant abnormality. 





Other: None. 





IMPRESSION: 


1. No evidence of abdominal aortic aneurysm or dissection. 


2. Essentially unremarkable CTA of the abdomen and pelvis. 


 





- FOLLOW UP


Follow Up: 





Patient will follow up with a primary care physician in the next 1-2 weeks.  

She does not currently have a primary care physician as she recently moved to 

the [place] and [place].  She realizes it is very important that she follows 

up given her poorly controlled hypertension and diabetes which may need 

additional adjustment of medications.





- TIME SPENT


Time Spent in Discharge (Minutes): 45

## 2018-03-26 NOTE — DISCHARGE PLAN
Discharge Plan


Disposition: 01 Home, Self Care


Condition: Fair


Prescriptions: 


Atorvastatin Calcium [Lipitor] 80 mg PO QPM #30 tablet


Blood Sugar Diagnostic [Glucometer Strips] 1 each MC TIDWM #90 strip


Blood-Glucose Meter [Glucometer] 1 each MC TIDWM #1 each


Glimepiride [Amaryl] 2 mg PO DAILYWM #30 tablet


Losartan [Cozaar] 50 mg PO DAILY #30 tablet


metFORMIN [Glucophage] 850 mg PO TIDWM #90 tablet


NIFEdipine [Nifedipine ER] 30 mg PO DAILY #30 tablet.er


Diet: Diabetic


Activity Restrictions: Activity as Tolerated


Shower Restrictions: No


Driving Restrictions: No


Weight Bearing: Full Weight


Additional Instructions or Follow Up instructions: 


The presents to the emergency department with chest pain.  You were placed in 

observation and underwent serial troponins, telemetry monitoring and a stress 

test.  Your troponins remained stable and your stress test was negative.  We 

did however find that you are very hypertensive and that you are diabetic and 

your hemoglobin A1c is very poorly controlled.  Also I found that you have high 

cholesterol.  You have been prescribed to blood pressure medications nifedipine 

and Cozaar which you need to take daily.  You have also been prescribed 2 

medications for your diabetes metformin which she will take 3 times a day and 

glipizide which she will take once a day.  You have also been prescribed 1 

medication for your cholesterol called Lipitor which you need to be taking 

daily.  I recommend that you use 1 of the PCPs from the list that was given to 

you and make an appointment within the next 2 weeks to follow-up.  It is very 

likely that you will need to be started on insulin within the next couple of 

months if your blood sugars do not improve.


Follow-Up Care: [facility] - Diabetes Ed


No Smoking: If you smoke, Please STOP!  Call [phone] for help.

## 2018-03-26 NOTE — NUCLEAR MEDICINE REPORT
EXAM:

SINGLE-ISOTOPE PHARMACOLOGICAL STRESS TEST WITH LEXISCAN. SINGLE-ISOTOPE AND SAME-DAY REST/STRESS NAVID
CARDIAL PERFUSION SCANS WITH TOMOGRAPHIC IMAGING, QUANTITATIVE ANALYSIS, WALL MOTION ANALYSIS AND MONSERRAT
CULATION OF EJECTION FRACTION.

 

EXAM DATE: 03/26/2018 1327 hrs.

 

CLINICAL HISTORY: CHEST PAIN.

 

COMPARISON: None.

 

TECHNIQUE: Following the intravenous administration of 10.3 mCi of Tc-99m sestamibi, a rest myocardia
l perfusion scan was done with tomography. Motion correction was applied when appropriate. 

 

After a delay of several hours on the same day, a pharmacological stress was performed with the infus
ion of 0.4 mg of Lexiscan. According to protocol, 41.8 mCi of Tc-99m sestamibi was injected for stres
s myocardial perfusion scan. Stress myocardial perfusion was done with tomography without attenuation
 correction. Motion correction was applied when appropriate.

 

Gated tomographic images were  obtained for wall motion analysis and computation of left ventricular 
ejection fraction.

 

FINDINGS: 

Perfusion images:

Left ventricular chamber size is normal at rest and unchanged at stress.

No convincing fixed perfusion deficits.

No convincing reversible perfusion deficits.

 

Gated images:

No convincing focal wall motion abnormality.

The left ventricular ejection fraction is estimated at 61% (normal > 50%).

 

IMPRESSION:

1. No convincing reversible perfusion deficits to indicate stress-induced ischemia.

2. No convincing fixed perfusion deficits.

3. Left ventricular ejection fraction of 61% (normal > 50%).

 

[facility] 

Referring Provider Line: [phone]

 

SITE ID: [id number]

## 2018-03-26 NOTE — PROCEDURE REPORT
Hospitalist Procedure Note





- Procedure Note


Procedure Note: 





Patient underwent a Lexiscan EKG stress test.  The patient's initial blood 

pressure was 180/90 with a heart rate of 79 at rest.  The patient did have some 

subtle T-wave inversions in the anterolateral leads and T-wave flattening in 

the inferior leads at rest.  With induction of exercise the patient did not 

have any significant changes there was some mild worsening of T-wave inversions 

in V2 and V3.  The patient did have chest pain at rest at around 2 out of 10 

this chest pain did not worsen during the exercise phase however she did have 

slight worsening of the chest pain during the recovery phase.  The patient had 

no significant changes in her EKG with worsening of the chest pain.  She did 

however have increase in her blood pressure when she had chest pain.  This is 

an inconclusive EKG stress test and we will await the results of the nuclear 

study.

## 2018-03-26 NOTE — NUCLEAR MEDICINE PRELIM REPORT
Accession: [id number]

Exam: NM MYOCARDIAL PERFUSION STR/RST

 

IMPRESSION:

1. No convincing reversible perfusion deficits to indicate stress-induced ischemia.

2. No convincing fixed perfusion deficits.

3. Left ventricular ejection fraction of 61% (normal > 50%).

 

[facility] 

 

SITE ID: [id number]

## 2018-06-27 ENCOUNTER — HOSPITAL ENCOUNTER (EMERGENCY)
Dept: HOSPITAL 76 - ED | Age: 53
Discharge: HOME | End: 2018-06-27
Payer: COMMERCIAL

## 2018-06-27 VITALS — DIASTOLIC BLOOD PRESSURE: 82 MMHG | SYSTOLIC BLOOD PRESSURE: 163 MMHG

## 2018-06-27 DIAGNOSIS — E78.00: ICD-10-CM

## 2018-06-27 DIAGNOSIS — R07.9: Primary | ICD-10-CM

## 2018-06-27 DIAGNOSIS — I10: ICD-10-CM

## 2018-06-27 DIAGNOSIS — E11.9: ICD-10-CM

## 2018-06-27 DIAGNOSIS — Z79.84: ICD-10-CM

## 2018-06-27 LAB
ALBUMIN DIAFP-MCNC: 3.6 G/DL (ref 3.2–5.5)
ALBUMIN/GLOB SERPL: 0.9 {RATIO} (ref 1–2.2)
ALP SERPL-CCNC: 73 IU/L (ref 42–121)
ALT SERPL W P-5'-P-CCNC: 17 IU/L (ref 10–60)
ANION GAP SERPL CALCULATED.4IONS-SCNC: 6 MMOL/L (ref 6–13)
AST SERPL W P-5'-P-CCNC: 18 IU/L (ref 10–42)
BASOPHILS NFR BLD AUTO: 0.1 10^3/UL (ref 0–0.1)
BASOPHILS NFR BLD AUTO: 0.9 %
BILIRUB BLD-MCNC: 0.5 MG/DL (ref 0.2–1)
BUN SERPL-MCNC: 13 MG/DL (ref 6–20)
CALCIUM UR-MCNC: 9.4 MG/DL (ref 8.5–10.3)
CHLORIDE SERPL-SCNC: 100 MMOL/L (ref 101–111)
CO2 SERPL-SCNC: 31 MMOL/L (ref 21–32)
CREAT SERPLBLD-SCNC: 0.8 MG/DL (ref 0.4–1)
EOSINOPHIL # BLD AUTO: 0.6 10^3/UL (ref 0–0.7)
EOSINOPHIL NFR BLD AUTO: 6.1 %
ERYTHROCYTE [DISTWIDTH] IN BLOOD BY AUTOMATED COUNT: 14 % (ref 12–15)
GFRSERPLBLD MDRD-ARVRAT: 91 ML/MIN/{1.73_M2} (ref 89–?)
GLOBULIN SER-MCNC: 4.1 G/DL (ref 2.1–4.2)
GLUCOSE SERPL-MCNC: 174 MG/DL (ref 70–100)
HGB UR QL STRIP: 11.4 G/DL (ref 12–16)
LIPASE SERPL-CCNC: 31 U/L (ref 22–51)
LYMPHOCYTES # SPEC AUTO: 3 10^3/UL (ref 1.5–3.5)
LYMPHOCYTES NFR BLD AUTO: 32.5 %
MCH RBC QN AUTO: 30.3 PG (ref 27–31)
MCHC RBC AUTO-ENTMCNC: 33.2 G/DL (ref 32–36)
MCV RBC AUTO: 91.1 FL (ref 81–99)
MONOCYTES # BLD AUTO: 0.6 10^3/UL (ref 0–1)
MONOCYTES NFR BLD AUTO: 6.4 %
NEUTROPHILS # BLD AUTO: 5 10^3/UL (ref 1.5–6.6)
NEUTROPHILS # SNV AUTO: 9.2 X10^3/UL (ref 4.8–10.8)
NEUTROPHILS NFR BLD AUTO: 54.1 %
PDW BLD AUTO: 7.2 FL (ref 7.9–10.8)
PLATELET # BLD: 304 10^3/UL (ref 130–450)
PROT SPEC-MCNC: 7.7 G/DL (ref 6.7–8.2)
RBC MAR: 3.76 10^6/UL (ref 4.2–5.4)
SODIUM SERPLBLD-SCNC: 137 MMOL/L (ref 135–145)

## 2018-06-27 PROCEDURE — 99284 EMERGENCY DEPT VISIT MOD MDM: CPT

## 2018-06-27 PROCEDURE — 80053 COMPREHEN METABOLIC PANEL: CPT

## 2018-06-27 PROCEDURE — 84484 ASSAY OF TROPONIN QUANT: CPT

## 2018-06-27 PROCEDURE — 93005 ELECTROCARDIOGRAM TRACING: CPT

## 2018-06-27 PROCEDURE — 71046 X-RAY EXAM CHEST 2 VIEWS: CPT

## 2018-06-27 PROCEDURE — 83690 ASSAY OF LIPASE: CPT

## 2018-06-27 PROCEDURE — 85025 COMPLETE CBC W/AUTO DIFF WBC: CPT

## 2018-06-27 PROCEDURE — 96374 THER/PROPH/DIAG INJ IV PUSH: CPT

## 2018-06-27 PROCEDURE — 99283 EMERGENCY DEPT VISIT LOW MDM: CPT

## 2018-06-27 PROCEDURE — 36415 COLL VENOUS BLD VENIPUNCTURE: CPT

## 2018-06-27 NOTE — ED PHYSICIAN DOCUMENTATION
PD HPI CHEST PAIN





- Stated complaint


Stated Complaint: CHEST PX





- Chief complaint


Chief Complaint: Cardiac





- History obtained from


History obtained from: Patient, Family





- History of Present Illness


Timing - onset: How many hours ago (6)


Timing - onset during: Rest


Timing - duration: Hours (6)


Timing - details: Gradual onset


Pain level max: 7


Pain level now: 5


Quality: Aching, Sharp, Pain


Location: Right chest, Right shoulder/arm


Improved by: Rest


Worsened by: Movement, Palpation


Associated symptoms: No: Shortness of air, Diaphoresis, Nausea, Vomiting, 

Feeling faint / dizzy, General Weakness, Palpitations, Cough


Similar symptoms before: Diagnosis (Chest wall pain)


Recently seen: Admitted (Recently admitted for same.  Negative cardiac stress 

test)





Review of Systems


Ten Systems: 10 systems reviewed and negative


Constitutional: denies: Fever, Chills


Ears: denies: Ear pain


Nose: denies: Rhinorrhea / runny nose, Congestion


Respiratory: denies: Dyspnea, Cough, Wheezing


GI: denies: Abdominal Pain, Vomiting, Diarrhea


Skin: denies: Rash


Musculoskeletal: denies: Neck pain, Back pain, Extremity swelling


Neurologic: denies: Focal weakness, Numbness





PD PAST MEDICAL HISTORY





- Past Medical History


Past Medical History: Yes


Cardiovascular: Hypertension, High cholesterol, Other


Respiratory: None


Endocrine/Autoimmune: Type 2 diabetes


GI: None


GYN: Other


: None


HEENT: Other


Psych: None


Musculoskeletal: None


Derm: None





- Past Surgical History


Past Surgical History: Yes


General: Other





- Present Medications


Home Medications: 


 Ambulatory Orders











 Medication  Instructions  Recorded  Confirmed


 


Blood Sugar Diagnostic [Glucometer 1 each  TIDWM #90 strip 03/26/18 





Strips]   


 


Blood-Glucose Meter [Glucometer] 1 each  TIDWM #1 each 03/26/18 


 


Glimepiride [Amaryl] 2 mg PO DAILYWM #30 tablet 03/26/18 


 


Losartan [Cozaar] 50 mg PO DAILY #30 tablet 03/26/18 


 


Multivitamin [Theragran] 1 tab PO DAILY 03/26/18 03/26/18


 


NIFEdipine [Nifedipine ER] 30 mg PO DAILY #30 tablet.er 03/26/18 


 


Gabapentin 300 mg PO 06/27/18 


 


Meloxicam [Mobic] 7.5 mg PO BID PRN #20 tablet 06/27/18 


 


Simvastatin 40 mg PO 06/27/18 


 


metFORMIN [Glucophage] 1,000 mg PO TIDWM 06/27/18 06/27/18














- Allergies


Allergies/Adverse Reactions: 


 Allergies











Allergy/AdvReac Type Severity Reaction Status Date / Time


 


No Known Drug Allergies Allergy   Verified 06/27/18 19:03














- Social History


Does the pt smoke?: No


Smoking Status: Never smoker


Does the pt drink ETOH?: No


Does the pt have substance abuse?: No





- Immunizations


Immunizations are current?: Yes





- POLST


Patient has POLST: No


POLST Status: Full Code





PD ED PE NORMAL





- Vitals


Vital signs reviewed: Yes





- General


General: Alert and oriented X 3, No acute distress





- HEENT


HEENT: Moist mucous membranes





- Neck


Neck: Supple, no meningeal sign





- Cardiac


Cardiac: RRR, No murmur, Strong equal pulses





- Respiratory


Respiratory: No respiratory distress, Clear bilaterally





- Abdomen


Abdomen: Soft, Non tender, Non distended





- Back


Back: No CVA TTP, No spinal TTP





- Derm


Derm: Warm and dry, No rash





- Extremities


Extremities: No edema, No calf tenderness / cord





- Neuro


Neuro: Alert and oriented X 3





- Psych


Psych: Normal mood, Normal affect





- Free text exam


Free text exam: 





Tender to palpation over the right anterior chest wall, reproduces her pain





Results





- Vitals


Vitals: 


 Oxygen











O2 Source                      Room air

















- EKG (time done)


  ** 1901


Rate: Rate (enter#) (80)


Rhythm: NSR


Axis: Normal


Intervals: Normal FL


QRS: Normal


Ischemia: Non specific changes


Compare to prior EKG: Unchanged from prior EKG





- Labs


Labs: 


 Laboratory Tests











  06/27/18 06/27/18 06/27/18





  19:21 19:21 19:21


 


WBC  9.2  


 


RBC  3.76 L  


 


Hgb  11.4 L  


 


Hct  34.3 L  


 


MCV  91.1  


 


MCH  30.3  


 


MCHC  33.2  


 


RDW  14.0  


 


Plt Count  304  


 


MPV  7.2 L  


 


Neut # (Auto)  5.0  


 


Lymph # (Auto)  3.0  


 


Mono # (Auto)  0.6  


 


Eos # (Auto)  0.6  


 


Baso # (Auto)  0.1  


 


Absolute Nucleated RBC  0.00  


 


Nucleated RBC %  0.0  


 


Sodium   137 


 


Potassium   3.4 L 


 


Chloride   100 L 


 


Carbon Dioxide   31 


 


Anion Gap   6.0 


 


BUN   13 


 


Creatinine   0.8 


 


Estimated GFR (MDRD)   91 


 


Glucose   174 H 


 


Calcium   9.4 


 


Total Bilirubin   0.5 


 


AST   18 


 


ALT   17 


 


Alkaline Phosphatase   73 


 


Troponin I    0.16


 


Total Protein   7.7 


 


Albumin   3.6 


 


Globulin   4.1 


 


Albumin/Globulin Ratio   0.9 L 


 


Lipase   31 














  06/27/18





  22:11


 


WBC 


 


RBC 


 


Hgb 


 


Hct 


 


MCV 


 


MCH 


 


MCHC 


 


RDW 


 


Plt Count 


 


MPV 


 


Neut # (Auto) 


 


Lymph # (Auto) 


 


Mono # (Auto) 


 


Eos # (Auto) 


 


Baso # (Auto) 


 


Absolute Nucleated RBC 


 


Nucleated RBC % 


 


Sodium 


 


Potassium 


 


Chloride 


 


Carbon Dioxide 


 


Anion Gap 


 


BUN 


 


Creatinine 


 


Estimated GFR (MDRD) 


 


Glucose 


 


Calcium 


 


Total Bilirubin 


 


AST 


 


ALT 


 


Alkaline Phosphatase 


 


Troponin I  0.14


 


Total Protein 


 


Albumin 


 


Globulin 


 


Albumin/Globulin Ratio 


 


Lipase 














- Rads (name of study)


  ** cxr


Radiology: Prelim report reviewed, EMP read contemporaneously, See rad report (

Normal)





PD MEDICAL DECISION MAKING





- ED course


Complexity details: reviewed old records, reviewed results, re-evaluated patient

, considered differential (No ST elevation MI, no aortic dissection, no PE, no 

tension pneumothorax, no aortic aneurysm), d/w patient


ED course: 





Patient is a 53-year-old female with atypical right-sided chest pain.  Appears 

to be chest wall pain.  Always has a mildly elevated troponin and her troponin 

2 are within her normal range.  We will have her start on a baby aspirin at 

home.  We will have her follow-up with her doctor.  Feels much better after 

Toradol.  Patient counseled regarding signs and symptoms for which I believe 

and urgent re-evaluation would be necessary. Patient with good understanding of 

and agreement to plan and is comfortable going home at this time





This document was made in part using voice recognition software. While efforts 

are made to proofread this document, sound alike and grammatical errors may 

occur.





- Sepsis Event


Vital Signs: 


 Oxygen











O2 Source                      Room air

















Departure





- Departure


Disposition: 01 Home, Self Care


Clinical Impression: 


Chest pain


Qualifiers:


 Chest pain type: unspecified Qualified Code(s): R07.9 - Chest pain, unspecified





Condition: Good


Instructions:  ED Chest Pain Pleurisy


Follow-Up: 


Velma Marie ARNP [Primary Care Provider] - Within 1 week


Prescriptions: 


Meloxicam [Mobic] 7.5 mg PO BID PRN #20 tablet


 PRN Reason: Pain


Comments: 


Return if you worsen. The cause of your symptoms may be related to inflammation 

in your ribs/chest wall.  This should improve over the next few days. Follow up 

with your doctor for further care. You should start on aspirin 81mg by mouth 

daily as well. 


Forms:  Activity restrictions


Discharge Date/Time: 06/27/18 23:02

## 2018-06-27 NOTE — XRAY REPORT
Procedure Date:  06/27/2018   

Accession Number:  [id number] / [id number]                    

Procedure:  XR  - Chest 2 View X-Ray CPT Code:  03114

 

FULL RESULT:

 

 

EXAM:

CHEST RADIOGRAPHY

 

EXAM DATE: 6/27/2018 07:40 PM.

 

CLINICAL HISTORY: Chest pain.

 

COMPARISON: 03/25/2018.

 

TECHNIQUE: 2 views.

 

FINDINGS:

Lungs/Pleura: No focal opacities evident. No pleural effusion. No 

pneumothorax. Normal volumes.

 

Mediastinum: Heart and mediastinal contours are unremarkable.

 

Other: None.

IMPRESSION:

Normal 2-view chest radiography.

 

[facility]

## 2020-10-15 ENCOUNTER — HOSPITAL ENCOUNTER (OUTPATIENT)
Dept: HOSPITAL 76 - LAB.WCP | Age: 55
Discharge: HOME | End: 2020-10-15
Attending: INTERNAL MEDICINE
Payer: COMMERCIAL

## 2020-10-15 DIAGNOSIS — I10: Primary | ICD-10-CM

## 2020-10-15 DIAGNOSIS — E78.5: ICD-10-CM

## 2020-10-15 DIAGNOSIS — E11.40: ICD-10-CM

## 2020-10-15 LAB
ALBUMIN DIAFP-MCNC: 3.8 G/DL (ref 3.2–5.5)
ALBUMIN/GLOB SERPL: 1 {RATIO} (ref 1–2.2)
ALP SERPL-CCNC: 114 IU/L (ref 42–121)
ALT SERPL W P-5'-P-CCNC: 18 IU/L (ref 10–60)
ANION GAP SERPL CALCULATED.4IONS-SCNC: 10 MMOL/L (ref 6–13)
AST SERPL W P-5'-P-CCNC: 20 IU/L (ref 10–42)
BILIRUB BLD-MCNC: 0.8 MG/DL (ref 0.2–1)
BUN SERPL-MCNC: 11 MG/DL (ref 6–20)
CALCIUM UR-MCNC: 9.6 MG/DL (ref 8.5–10.3)
CHLORIDE SERPL-SCNC: 96 MMOL/L (ref 101–111)
CHOLEST SERPL-MCNC: 359 MG/DL
CO2 SERPL-SCNC: 30 MMOL/L (ref 21–32)
CREAT SERPLBLD-SCNC: 0.7 MG/DL (ref 0.4–1)
GLOBULIN SER-MCNC: 3.7 G/DL (ref 2.1–4.2)
GLUCOSE SERPL-MCNC: 411 MG/DL (ref 70–100)
HBA1C MFR BLD HPLC: 14.8 % (ref 4.27–6.07)
HDLC SERPL-MCNC: 39 MG/DL
HDLC SERPL: 9.2 {RATIO} (ref ?–4.4)
LDLC SERPL CALC-MCNC: 245 MG/DL
LDLC/HDLC SERPL: 6.3 {RATIO} (ref ?–4.4)
PROT SPEC-MCNC: 7.5 G/DL (ref 6.7–8.2)
SODIUM SERPLBLD-SCNC: 136 MMOL/L (ref 135–145)
VLDLC SERPL-SCNC: 75 MG/DL

## 2020-10-15 PROCEDURE — 83036 HEMOGLOBIN GLYCOSYLATED A1C: CPT

## 2020-10-15 PROCEDURE — 83721 ASSAY OF BLOOD LIPOPROTEIN: CPT

## 2020-10-15 PROCEDURE — 80053 COMPREHEN METABOLIC PANEL: CPT

## 2020-10-15 PROCEDURE — 36415 COLL VENOUS BLD VENIPUNCTURE: CPT

## 2020-10-15 PROCEDURE — 80061 LIPID PANEL: CPT

## 2021-04-21 ENCOUNTER — HOSPITAL ENCOUNTER (INPATIENT)
Dept: HOSPITAL 76 - ED | Age: 56
LOS: 4 days | Discharge: HOME | DRG: 194 | End: 2021-04-25
Attending: INTERNAL MEDICINE | Admitting: INTERNAL MEDICINE
Payer: COMMERCIAL

## 2021-04-21 DIAGNOSIS — R94.31: ICD-10-CM

## 2021-04-21 DIAGNOSIS — E87.1: ICD-10-CM

## 2021-04-21 DIAGNOSIS — J18.9: Primary | ICD-10-CM

## 2021-04-21 DIAGNOSIS — E11.65: ICD-10-CM

## 2021-04-21 DIAGNOSIS — I10: ICD-10-CM

## 2021-04-21 DIAGNOSIS — E87.6: ICD-10-CM

## 2021-04-21 DIAGNOSIS — Z79.899: ICD-10-CM

## 2021-04-21 DIAGNOSIS — I95.1: ICD-10-CM

## 2021-04-21 DIAGNOSIS — Z20.822: ICD-10-CM

## 2021-04-21 DIAGNOSIS — D50.9: ICD-10-CM

## 2021-04-21 DIAGNOSIS — E86.0: ICD-10-CM

## 2021-04-21 DIAGNOSIS — E78.00: ICD-10-CM

## 2021-04-21 DIAGNOSIS — Z79.84: ICD-10-CM

## 2021-04-21 DIAGNOSIS — R43.2: ICD-10-CM

## 2021-04-21 DIAGNOSIS — Z22.322: ICD-10-CM

## 2021-04-21 LAB
ALBUMIN DIAFP-MCNC: 3.2 G/DL (ref 3.2–5.5)
ALBUMIN/GLOB SERPL: 0.6 {RATIO} (ref 1–2.2)
ALP SERPL-CCNC: 88 IU/L (ref 42–121)
ALT SERPL W P-5'-P-CCNC: 22 IU/L (ref 10–60)
ANION GAP SERPL CALCULATED.4IONS-SCNC: 13 MMOL/L (ref 6–13)
ANION GAP SERPL CALCULATED.4IONS-SCNC: 14 MMOL/L (ref 6–13)
ANION GAP SERPL CALCULATED.4IONS-SCNC: 16 MMOL/L (ref 6–13)
ANION GAP SERPL CALCULATED.4IONS-SCNC: 18 MMOL/L (ref 6–13)
AST SERPL W P-5'-P-CCNC: 30 IU/L (ref 10–42)
B PARAPERT DNA SPEC QL NAA+PROBE: NOT DETECTED
B PERT DNA SPEC QL NAA+PROBE: NOT DETECTED
BASE EXCESS BLDV CALC-SCNC: 3 MMOL/L
BASOPHILS NFR BLD AUTO: 0 10^3/UL (ref 0–0.1)
BASOPHILS NFR BLD AUTO: 0.4 %
BILIRUB BLD-MCNC: 0.5 MG/DL (ref 0.2–1)
BUN SERPL-MCNC: 11 MG/DL (ref 6–20)
BUN SERPL-MCNC: 14 MG/DL (ref 6–20)
BUN SERPL-MCNC: 15 MG/DL (ref 6–20)
BUN SERPL-MCNC: 16 MG/DL (ref 6–20)
C PNEUM DNA NPH QL NAA+NON-PROBE: NOT DETECTED
CALCIUM UR-MCNC: 9.1 MG/DL (ref 8.5–10.3)
CALCIUM UR-MCNC: 9.2 MG/DL (ref 8.5–10.3)
CALCIUM UR-MCNC: 9.5 MG/DL (ref 8.5–10.3)
CALCIUM UR-MCNC: 9.6 MG/DL (ref 8.5–10.3)
CHLORIDE SERPL-SCNC: 90 MMOL/L (ref 101–111)
CHLORIDE SERPL-SCNC: 91 MMOL/L (ref 101–111)
CHLORIDE SERPL-SCNC: 94 MMOL/L (ref 101–111)
CHLORIDE SERPL-SCNC: 96 MMOL/L (ref 101–111)
CLARITY UR REFRACT.AUTO: CLEAR
CO2 BLDV-SCNC: 30.3 MMOL/L (ref 24–29)
CO2 SERPL-SCNC: 25 MMOL/L (ref 21–32)
CO2 SERPL-SCNC: 28 MMOL/L (ref 21–32)
CO2 SERPL-SCNC: 28 MMOL/L (ref 21–32)
CO2 SERPL-SCNC: 29 MMOL/L (ref 21–32)
CREAT SERPLBLD-SCNC: 0.8 MG/DL (ref 0.4–1)
CREAT SERPLBLD-SCNC: 0.9 MG/DL (ref 0.4–1)
CREAT SERPLBLD-SCNC: 1 MG/DL (ref 0.4–1)
CREAT SERPLBLD-SCNC: 1.1 MG/DL (ref 0.4–1)
EOSINOPHIL # BLD AUTO: 0.2 10^3/UL (ref 0–0.7)
EOSINOPHIL NFR BLD AUTO: 1.5 %
ERYTHROCYTE [DISTWIDTH] IN BLOOD BY AUTOMATED COUNT: 13 % (ref 12–15)
FLUAV RNA RESP QL NAA+PROBE: NOT DETECTED
GFRSERPLBLD MDRD-ARVRAT: 62 ML/MIN/{1.73_M2} (ref 89–?)
GFRSERPLBLD MDRD-ARVRAT: 69 ML/MIN/{1.73_M2} (ref 89–?)
GFRSERPLBLD MDRD-ARVRAT: 78 ML/MIN/{1.73_M2} (ref 89–?)
GFRSERPLBLD MDRD-ARVRAT: 90 ML/MIN/{1.73_M2} (ref 89–?)
GLOBULIN SER-MCNC: 5 G/DL (ref 2.1–4.2)
GLUCOSE SERPL-MCNC: 178 MG/DL (ref 70–100)
GLUCOSE SERPL-MCNC: 205 MG/DL (ref 70–100)
GLUCOSE SERPL-MCNC: 314 MG/DL (ref 70–100)
GLUCOSE SERPL-MCNC: 710 MG/DL (ref 70–100)
GLUCOSE UR QL STRIP.AUTO: >=1000 MG/DL
HAEM INFLU B DNA SPEC QL NAA+PROBE: NOT DETECTED
HCO3 BLDMV-SCNC: 28.8 MMOL/L (ref 23–28)
HCOV 229E RNA SPEC QL NAA+PROBE: NOT DETECTED
HCOV HKU1 RNA UPPER RESP QL NAA+PROBE: NOT DETECTED
HCOV NL63 RNA ASPIRATE QL NAA+PROBE: NOT DETECTED
HCOV OC43 RNA SPEC QL NAA+PROBE: NOT DETECTED
HCT VFR BLD AUTO: 31.9 % (ref 37–47)
HGB UR QL STRIP: 10.3 G/DL (ref 12–16)
HMPV AG SPEC QL: NOT DETECTED
HPIV1 RNA NPH QL NAA+PROBE: NOT DETECTED
HPIV2 SPEC QL CULT: NOT DETECTED
HPIV3 AB TITR SER CF: NOT DETECTED {TITER}
HPIV4 RNA SPEC QL NAA+PROBE: NOT DETECTED
INR PPP: 1.2 (ref 0.8–1.2)
KETONES UR QL STRIP.AUTO: NEGATIVE MG/DL
LIPASE SERPL-CCNC: 26 U/L (ref 22–51)
LYMPHOCYTES # SPEC AUTO: 1.3 10^3/UL (ref 1.5–3.5)
LYMPHOCYTES NFR BLD AUTO: 12.1 %
M PNEUMO DNA SPEC QL NAA+PROBE: NOT DETECTED
MAGNESIUM SERPL-MCNC: 2 MG/DL (ref 1.7–2.8)
MAGNESIUM SERPL-MCNC: 2.2 MG/DL (ref 1.7–2.8)
MAGNESIUM SERPL-MCNC: 2.2 MG/DL (ref 1.7–2.8)
MCH RBC QN AUTO: 29.6 PG (ref 27–31)
MCHC RBC AUTO-ENTMCNC: 32.3 G/DL (ref 32–36)
MCV RBC AUTO: 91.7 FL (ref 81–99)
MONOCYTES # BLD AUTO: 1.6 10^3/UL (ref 0–1)
MONOCYTES NFR BLD AUTO: 15.1 %
NEUTROPHILS # BLD AUTO: 7.3 10^3/UL (ref 1.5–6.6)
NEUTROPHILS # SNV AUTO: 10.3 X10^3/UL (ref 4.8–10.8)
NEUTROPHILS NFR BLD AUTO: 70.5 %
NITRITE UR QL STRIP.AUTO: NEGATIVE
NRBC # BLD AUTO: 0 /100WBC
NRBC # BLD AUTO: 0 X10^3/UL
PCO2 BLDV: 49.2 MMHG (ref 41–51)
PDW BLD AUTO: 10.3 FL (ref 7.9–10.8)
PH BLDV: 7.38 [PH] (ref 7.31–7.41)
PH UR STRIP.AUTO: 6 PH (ref 5–7.5)
PLATELET # BLD: 229 10^3/UL (ref 130–450)
PO2 BLDV: 24.2 MMHG (ref 25–47)
POTASSIUM SERPL-SCNC: 3.2 MMOL/L (ref 3.5–5)
POTASSIUM SERPL-SCNC: 3.3 MMOL/L (ref 3.5–5)
POTASSIUM SERPL-SCNC: 3.5 MMOL/L (ref 3.5–5)
POTASSIUM SERPL-SCNC: 3.8 MMOL/L (ref 3.5–5)
PROT SPEC-MCNC: 8.2 G/DL (ref 6.7–8.2)
PROT UR STRIP.AUTO-MCNC: NEGATIVE MG/DL
PROTHROM ACT/NOR PPP: 13.2 SECS (ref 9.9–12.6)
RBC # UR STRIP.AUTO: NEGATIVE /UL
RBC MAR: 3.48 10^6/UL (ref 4.2–5.4)
RSV RNA RESP QL NAA+PROBE: NOT DETECTED
RV+EV RNA SPEC QL NAA+PROBE: NOT DETECTED
SAO2 DF BLDV: 46.8 % (ref 60–80)
SARS-COV-2 RNA PNL SPEC NAA+PROBE: NOT DETECTED
SODIUM SERPLBLD-SCNC: 129 MMOL/L (ref 135–145)
SODIUM SERPLBLD-SCNC: 137 MMOL/L (ref 135–145)
SODIUM SERPLBLD-SCNC: 138 MMOL/L (ref 135–145)
SODIUM SERPLBLD-SCNC: 138 MMOL/L (ref 135–145)
SP GR UR STRIP.AUTO: <=1.005 (ref 1–1.03)
UROBILINOGEN UR QL STRIP.AUTO: (no result) E.U./DL
UROBILINOGEN UR STRIP.AUTO-MCNC: NEGATIVE MG/DL

## 2021-04-21 PROCEDURE — 36415 COLL VENOUS BLD VENIPUNCTURE: CPT

## 2021-04-21 PROCEDURE — 82947 ASSAY GLUCOSE BLOOD QUANT: CPT

## 2021-04-21 PROCEDURE — 84484 ASSAY OF TROPONIN QUANT: CPT

## 2021-04-21 PROCEDURE — 96367 TX/PROPH/DG ADDL SEQ IV INF: CPT

## 2021-04-21 PROCEDURE — 85025 COMPLETE CBC W/AUTO DIFF WBC: CPT

## 2021-04-21 PROCEDURE — 82607 VITAMIN B-12: CPT

## 2021-04-21 PROCEDURE — 80048 BASIC METABOLIC PNL TOTAL CA: CPT

## 2021-04-21 PROCEDURE — 84630 ASSAY OF ZINC: CPT

## 2021-04-21 PROCEDURE — 71045 X-RAY EXAM CHEST 1 VIEW: CPT

## 2021-04-21 PROCEDURE — 83735 ASSAY OF MAGNESIUM: CPT

## 2021-04-21 PROCEDURE — 81003 URINALYSIS AUTO W/O SCOPE: CPT

## 2021-04-21 PROCEDURE — 82746 ASSAY OF FOLIC ACID SERUM: CPT

## 2021-04-21 PROCEDURE — 85610 PROTHROMBIN TIME: CPT

## 2021-04-21 PROCEDURE — 83690 ASSAY OF LIPASE: CPT

## 2021-04-21 PROCEDURE — 83540 ASSAY OF IRON: CPT

## 2021-04-21 PROCEDURE — 93306 TTE W/DOPPLER COMPLETE: CPT

## 2021-04-21 PROCEDURE — 93005 ELECTROCARDIOGRAM TRACING: CPT

## 2021-04-21 PROCEDURE — 87150 DNA/RNA AMPLIFIED PROBE: CPT

## 2021-04-21 PROCEDURE — 84100 ASSAY OF PHOSPHORUS: CPT

## 2021-04-21 PROCEDURE — 83036 HEMOGLOBIN GLYCOSYLATED A1C: CPT

## 2021-04-21 PROCEDURE — 82272 OCCULT BLD FECES 1-3 TESTS: CPT

## 2021-04-21 PROCEDURE — 82009 KETONE BODYS QUAL: CPT

## 2021-04-21 PROCEDURE — 81001 URINALYSIS AUTO W/SCOPE: CPT

## 2021-04-21 PROCEDURE — 84466 ASSAY OF TRANSFERRIN: CPT

## 2021-04-21 PROCEDURE — 96366 THER/PROPH/DIAG IV INF ADDON: CPT

## 2021-04-21 PROCEDURE — 80053 COMPREHEN METABOLIC PANEL: CPT

## 2021-04-21 PROCEDURE — 87040 BLOOD CULTURE FOR BACTERIA: CPT

## 2021-04-21 PROCEDURE — 99285 EMERGENCY DEPT VISIT HI MDM: CPT

## 2021-04-21 PROCEDURE — 96365 THER/PROPH/DIAG IV INF INIT: CPT

## 2021-04-21 PROCEDURE — 82803 BLOOD GASES ANY COMBINATION: CPT

## 2021-04-21 PROCEDURE — 84132 ASSAY OF SERUM POTASSIUM: CPT

## 2021-04-21 PROCEDURE — 87086 URINE CULTURE/COLONY COUNT: CPT

## 2021-04-21 PROCEDURE — 82728 ASSAY OF FERRITIN: CPT

## 2021-04-21 PROCEDURE — 0202U NFCT DS 22 TRGT SARS-COV-2: CPT

## 2021-04-21 PROCEDURE — 96361 HYDRATE IV INFUSION ADD-ON: CPT

## 2021-04-21 RX ADMIN — Medication SCH AMP: at 20:31

## 2021-04-21 RX ADMIN — ACETAMINOPHEN PRN MG: 325 TABLET ORAL at 21:55

## 2021-04-21 RX ADMIN — GABAPENTIN SCH MG: 300 CAPSULE ORAL at 21:32

## 2021-04-21 RX ADMIN — SODIUM CHLORIDE SCH MLS/HR: 9 INJECTION, SOLUTION INTRAVENOUS at 15:45

## 2021-04-21 RX ADMIN — FAMOTIDINE SCH MG: 20 TABLET, FILM COATED ORAL at 20:31

## 2021-04-21 RX ADMIN — SODIUM CHLORIDE, PRESERVATIVE FREE SCH ML: 5 INJECTION INTRAVENOUS at 17:07

## 2021-04-21 RX ADMIN — INSULIN GLARGINE SCH UNIT: 100 INJECTION, SOLUTION SUBCUTANEOUS at 22:57

## 2021-04-21 RX ADMIN — SODIUM CHLORIDE SCH: 9 INJECTION, SOLUTION INTRAVENOUS at 23:33

## 2021-04-21 NOTE — EXTERNAL MEDICAL SUMMARY RPT
Continuity of Care Document

                            Created on:2021



Patient:MARGOT SANCHEZ

Sex:Female

:1965

External Reference #:[id number]





Demographics







                          Phone                     Unavailable

 

                          Preferred Language        Unknown

 

                          Marital Status            Unknown

 

                          Sikhism Affiliation     Unknown

 

                          Race                      Unknown

 

                          Ethnic Group              Unknown









Author







                          Organization              [facility]

 

                          Address                    [place] [id number]

 

                          Phone                     [phone]









Social History







                     date                description         facility

 

                     [phone]

## 2021-04-21 NOTE — XRAY REPORT
PROCEDURE:  Chest 1 View X-Ray

 

INDICATIONS:  Chest pain

 

TECHNIQUE:  One view of the chest was acquired.  

 

COMPARISON:  6/27/2018

 

FINDINGS:  

 

Surgical changes and devices:  None.  

 

Lungs and pleura:  No pleural effusions or pneumothorax.  Lungs are clear.  

 

Mediastinum:  Mediastinal contours appear normal.  Heart size is normal.  

 

Bones and chest wall:  No suspicious bony lesions.  Overlying soft tissues appear unremarkable.  

 

IMPRESSION:  

No evidence acute pulmonary process.

 

Reviewed by: Edinson Lopez MD on 4/21/2021 1:25 PM PDT

Approved by: Edinson Lopez MD on 4/21/2021 1:25 PM PDT

 

 

Station ID:  [id number]

## 2021-04-21 NOTE — PHARMACY PROGRESS NOTE
- Best Possible Medication History


Admit Date and Time: 04/21/21 1444


Processed by: Pharmacy


Medication History completed: Yes


Patient Interview: Completed


Secondary Source(s): Physician records, Pharmacy records, Insurance records 

(PATIENT INTERVIEWED BY PHARMACY. PATIENT ABLE TO CONFIRM HOME MEDICATIONS )





As the person ultimately responsible for medication therapy, providers are able 

to order a medication from an existing home medication list in [facility] via the 

"Reconcile Routine" prior to Confirmation of that medication by support staff. 

Such practice is discouraged except when the physician, in their clinical 

judgment, deems that a medical need exists for a medication without regard to 

previous use.

## 2021-04-21 NOTE — EXTERNAL MEDICAL SUMMARY RPT
Continuity of Care Document

                            Created on:2021



Patient:MARGOT SANCHEZ

Sex:Female

:1965

External Reference #:[id number]





Demographics







                          Phone                     Unavailable

 

                          Preferred Language        Unknown

 

                          Marital Status            Unknown

 

                          Church Affiliation     Unknown

 

                          Race                      Unknown

 

                          Ethnic Group              Unknown









Author







                          Organization              [facility]

 

                          Address                    [place] [address]

 

                          Phone                     [phone]









Social History







                     date                description         facility

 

                     [phone]

## 2021-04-21 NOTE — HISTORY & PHYSICAL EXAMINATION
Chief Complaint





- Chief Complaint


Chief Complaint: Weakness and collapse





History of Present Illness





- Admitted From


Admitted From:: ER





- History of Present Illness


HPI Comment/Other: 





This is a 56-year-old black female with a history of Diabetes, on metformin, 

actos and glimiperide, HTN and hyperlipidemia, who has been working 12-hour 

shifts for 8 days straight, has been feeling very fatigued, has received her 2 

Covid vaccines and then noticed a change in her taste.  She states her diet of 

solids has not been its usual because of the loss of taste and she admits to 

drinking more sodas with sugar and lemonade with sugar than usual, to see if her

taste was really affected.  At home she collapsed onto the floor today while 

walking, where she laid for a while, and her  had to pick her up and he 

urged her to come to the ER.  There was no syncope.  She remembers the entire 

event.  There had been dizziness and weakness especially worse today.  She 

denies a fever, cough, shortness of breath, or chest pain.  She has noticed 

polyuria and was even incontinent of urine twice in the past 3 days, but has had

no dysuria. She is compliant with her medications including actos and 

glimiperide, but she took herself off metformin when Dr Sanchez added actos 

several mos ago, she said, when her last A1c was 14 in 10/2020.  She was 

checking her blood sugars, which were running 180-200, but no checks for 1 week.

 In the ER she was found to have a serum glucose of 710, normal serum pH of 7.39

and low sodium of 127.  She is presented for management of very high glucose and

dehydration and is being placed in Observation status.


I did discuss her code wishes with her and she wishes to be a DNR, DNI.








History





- Past Medical History


Cardiovascular: reports: Hypertension, High cholesterol, Other


Respiratory: reports: None


Endocrine/Autoimmune: reports: Type 2 diabetes


GI: reports: None


GYN: reports: Other


: reports: None


HEENT: reports: Other


Psych: reports: None


Musculoskeletal: reports: None


Derm: reports: None


MRSA Hx?: No





- Past Surgical History


General: reports: Other





- Family & Social History


Family History Comment/Other: Dad and Mom both  in their late 60's. They had

DM,HTN, chol, CAD at their deaths. She had 18 siblings. 3 sisters have  of 

cancer; 1 was breast but the other two she doesn't know what kind of cancer. 1 

brother  of GSW. 1 brother  of MI. Her 3 children are healthy


Living arrangement: At home


Living Situation: With spouse/s.o.


Social History Notes: she was born in [facility] and has lived all over the 

[facility] bc of work and family. She has been  to her 2nd  for 17 

years. They met at her cousins house one night for dinner on a blind date and 

have been together even since. Her 3 children live in [place]. She never 

smoked, and never really even socially drank since she doesn't like the taste of

it. 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 

different locations. [place] went on and now plan on being

in the [place] for a while. They [place] on the [address] [place].





- Substance History


Use: Uses substance without health or social issues: NONE


Abuse: Recurrent use of substance despite neg consequences: NONE





- POLST


Patient has POLST: No


POLST Status: DNR





Meds/Allgy





- Home Medications


Home Medications: 


                                Ambulatory Orders











 Medication  Instructions  Recorded  Confirmed


 


Glimepiride [Amaryl] 2 mg PO DAILYWM #30 tablet 18


 


Losartan [Cozaar] 50 mg PO DAILY #30 tablet 18


 


NIFEdipine [Nifedipine ER] 30 mg PO DAILY #30 tablet.er 18


 


Gabapentin 1,200 mg PO BID 18


 


Simvastatin 40 mg PO QPM 18


 


Gabapentin [Neurontin] 600 mg PO 1200 21


 


Pioglitazone HCl [Actos] 30 mg PO DAILY 21














- Allergies


Allergies/Adverse Reactions: 


                                    Allergies











Allergy/AdvReac Type Severity Reaction Status Date / Time


 


No Known Drug Allergies Allergy   Verified 21 12:47














Review of Systems





- Constitutional


Constitutional: reports: Fatigue, Weakness, Other (Loss of taste, for which she 

started drinking more cola and lemonade with sugar than usual.)





- Cardiovascular


Cariovascular: reports: Other (Had stress test here in 2018 to mevaluate chest 

pain, was normal)





- All Other Systems


All Other Systems: reports: Reviewed and negative





- Other Findings


Other Findings: 





She never smoked cigarettes, she very rarely drinks any alcohol





Exam





- Vital Signs


Vital Signs: 





                                Vital Signs x48h











  Temp Pulse Pulse Resp BP BP Pulse Ox


 


 21 15:39  38 C H   102 H  23   135/109 H  99


 


 21 14:00   102 H   16  135/109 H   96


 


 21 13:30   100   16  143/79 H   96


 


 21 12:40  36.4 C L  100   16  115/102 H   100














Conclusion/Plan





- Problem List


(1) Uncontrolled diabetes mellitus


Conclusion/Plan: 


Etiology for such poor control is unclear, possibly partly because of her 

increased lemonade and cola intake, or a subtle infection


We will look for a possible infection, or acute MI for reason for such high 

serum glucose.


Start low-dose regular insulin drip in the ICU and frequent glu checks, plus 

before meals and at bedtime glucose checks, start a diabetic diet, transition to

 iv with D5 when serum glucose is less than 200, and then sliding scale insulin 

eventually.


Will resume her Glimiperide, but hold metformin.


Check HbA1c.  Since she was already on 3 oral diabetic agents, she may now need 

insulin dosing going forward after this hospital visit.


Will obtain a Dietician consult.


Qualifiers: 


   Diabetes mellitus type: type 2   Glycemic state: with hyperglycemia   

Qualified Code(s): E11.65 - Type 2 diabetes mellitus with hyperglycemia   





(2) Ageusia


Conclusion/Plan: 


This occurred after her second Covid vaccination was given, which is not a 

typical vaccination side effect but rather a side effect after the Covid 

infection.


There are no other focal neurologic changes


I will reach out to  infectious disease dept regarding this unexpected 

symptom, since she has tested negative for Covid on the respiratory panel/bio 

fire test today.








(3) Hyponatremia


Conclusion/Plan: 


This is likely pseudohyponatremia, very low because of the extremely high 

glucose.


We will start IV normal saline hydration.


Follow BMP daily








(4) Collapse


Conclusion/Plan: 


This is likely from dehydration related to the hyperglycemia.


Begin IV saline for re hydration.


Check orthostatic vital signs.


Will check troponin empirically and also obtain a full Echo, to evaluate a 

reason for her collapse.


Telemetry monitor.








(5) Hypertension


Qualifiers: 


   Hypertension type: unspecified   Qualified Code(s): I10 - Essential (primary)

 hypertension   





(6) Hypokalemia


Conclusion/Plan: 


Related to poor p.o. intake for 1 week.


Replace potassium with K riders and in her rehydration fluids.


Follow BMP day








(7) Anemia


Conclusion/Plan: 


Her Hemoglobin is already low at 10 despite being dehydrated therefore she is 

likely very anemic.  This may also be adding to her weakness.


We will check B12, folate levels and iron stores and replace if low.


If iron is low, will check stool guaiac.








(8) Abnormal EKG


Conclusion/Plan: 


She had an abnormal EKG at her 2018 stress test, which came out normal.


We will obtain a resting echo to evaluate LV and RV contractility








- Lab Results


Fish Bones: 


                                 21 13:07





                                 21 17:00





Core Measures





- Anticipated LOS


I expect patient to be DC'd or transferred within 96 hours.: Yes





- DVT/VTE - Prophylaxis


VTE/DVT Device ordered at admit?: Yes

## 2021-04-21 NOTE — EXTERNAL MEDICAL SUMMARY RPT
Continuity of Care Document

                            Created on:2021



Patient:MARGOT SANCHEZ

Sex:Female

:1965

External Reference #:[id number]





Demographics







                          Phone                     Unavailable

 

                          Preferred Language        Unknown

 

                          Marital Status            Unknown

 

                          Advent Affiliation     Unknown

 

                          Race                      Unknown

 

                          Ethnic Group              Unknown









Author







                          Organization              [facility]

 

                          Address                    [place] [address]

 

                          Phone                     [phone]









Social History







                     date                description         facility

 

                     [id number]

## 2021-04-21 NOTE — ED PHYSICIAN DOCUMENTATION
History of Present Illness





- Stated complaint


Stated Complaint: LT SIDE PX





- Chief complaint


Chief Complaint: Cardiac





- History obtained from


History obtained from: Patient





- Additonal information


Additional information: 


Patient comes emergency department chief complaint of fatigue, dizziness, and 

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

 She states it was hard to care for herself and that she also did not eat very 

well during that time.  Patient also notes that she felt as though she could not

taste anything a couple of days ago, so she made a number of strong-tasting 

beverages, such as lemonade, to try to detect some flavor.  Patient denies any 

loss of sense of smell.  She did have both her Covid vaccines already.  Patient 

states she began to notice that she was urinating a lot, but did not check her 

blood sugar.  Patient is a diabetes patient and takes Actos, which she has been 

on for some time.  She denies recent dose changes.  Today, patient collapsed on 

the floor and found it hard to get up and her  insisted she come to the 

emergency department.  She denies chest pain or shortness of breath.  No focal 

weakness.  No nausea or vomiting.  No other complaints at this time.








Review of Systems


Ten Systems: 10 systems reviewed and negative


Constitutional: reports: Fatigue


Eyes: reports: Reviewed and negative


Ears: reports: Reviewed and negative


Nose: reports: Reviewed and negative


Throat: reports: Reviewed and negative


Cardiac: reports: Reviewed and negative


Respiratory: reports: Reviewed and negative


GI: reports: Reviewed and negative


: reports: Reviewed and negative


Skin: reports: Reviewed and negative


Musculoskeletal: reports: Reviewed and negative


Neurologic: reports: Generalized weakness, Near syncope


Psychiatric: reports: Reviewed and negative


Endocrine: reports: Polyuria


Immunocompromised: reports: Reviewed and negative





PD PAST MEDICAL HISTORY





- Past Medical History


Cardiovascular: Hypertension, High cholesterol, Other


Respiratory: None


Endocrine/Autoimmune: Type 2 diabetes


GI: None


GYN: Other


: None


HEENT: Other


Psych: None


Musculoskeletal: None


Derm: None





- Past Surgical History


Past Surgical History: Yes


General: Other





- Present Medications


Home Medications: 


                                Ambulatory Orders











 Medication  Instructions  Recorded  Confirmed


 


Glimepiride [Amaryl] 2 mg PO DAILYWM #30 tablet 03/26/18 04/21/21


 


Losartan [Cozaar] 50 mg PO DAILY #30 tablet 03/26/18 04/21/21


 


NIFEdipine [Nifedipine ER] 30 mg PO DAILY #30 tablet.er 03/26/18 04/21/21


 


Gabapentin 1,200 mg PO BID 06/27/18 04/21/21


 


Simvastatin 40 mg PO QPM 06/27/18 04/21/21


 


Gabapentin [Neurontin] 600 mg PO 1200 04/21/21 04/21/21


 


Pioglitazone HCl [Actos] 30 mg PO DAILY 04/21/21 04/21/21














- Allergies


Allergies/Adverse Reactions: 


                                    Allergies











Allergy/AdvReac Type Severity Reaction Status Date / Time


 


No Known Drug Allergies Allergy   Verified 04/21/21 12:47














- Social History


Does the pt smoke?: No


Smoking Status: Never smoker


Does the pt drink ETOH?: No


Does the pt have substance abuse?: No





- Immunizations


Immunizations are current?: Yes





- POLST


Patient has POLST: No


POLST Status: Full Code





PD ED PE NORMAL





- Vitals


Vital signs reviewed: Yes





- General


General: Alert and oriented X 3, No acute distress, Well developed/nourished





- HEENT


HEENT: Atraumatic, PERRL, EOMI, Moist mucous membranes





- Neck


Neck: Supple, no meningeal sign





- Cardiac


Cardiac: RRR, No murmur, Strong equal pulses





- Respiratory


Respiratory: No respiratory distress, Clear bilaterally





- Abdomen


Abdomen: Soft, Non tender, Non distended





- Derm


Derm: Normal color, Warm and dry, No rash





- Extremities


Extremities: No deformity, No edema, No calf tenderness / cord





- Neuro


Neuro: Alert and oriented X 3, CNs 2-12 intact, Normal speech, Other (Grossly 

intact otherwise)





- Psych


Psych: Normal mood, Normal affect





Results





- Vitals


Vitals: 


                               Vital Signs - 24 hr











  04/21/21 04/21/21 04/21/21





  12:40 13:30 14:00


 


Temperature 36.4 C L  


 


Heart Rate 100 100 102 H


 


Respiratory 16 16 16





Rate   


 


Blood Pressure 115/102 H 143/79 H 135/109 H


 


O2 Saturation 100 96 96








                                     Oxygen











O2 Source                      Room air

















- Labs


Labs: 


                                Laboratory Tests











  04/21/21 04/21/21 04/21/21





  13:07 13:07 13:07


 


WBC  10.3  


 


RBC  3.48 L  


 


Hgb  10.3 L  


 


Hct  31.9 L  


 


MCV  91.7  


 


MCH  29.6  


 


MCHC  32.3  


 


RDW  13.0  


 


Plt Count  229  


 


MPV  10.3  


 


Neut # (Auto)  7.3 H  


 


Lymph # (Auto)  1.3 L  


 


Mono # (Auto)  1.6 H  


 


Eos # (Auto)  0.2  


 


Baso # (Auto)  0.0  


 


Absolute Nucleated RBC  0.00  


 


Nucleated RBC %  0.0  


 


PT   


 


INR   


 


VBG pH   


 


VBG pCO2   


 


VBG pO2   


 


VBG HCO3   


 


VBG Total CO2   


 


VBG O2 Saturation   


 


VBG Base Excess   


 


Sodium   129 L 


 


Potassium   3.8 


 


Chloride   90 L 


 


Carbon Dioxide   25 


 


Anion Gap   14.0 H 


 


BUN   16 


 


Creatinine   1.1 H 


 


Estimated GFR (MDRD)   62 L 


 


Glucose   710 H* 


 


Calcium   9.2 


 


Total Bilirubin   0.5 


 


AST   30 


 


ALT   22 


 


Alkaline Phosphatase   88 


 


Troponin I High Sens    6.1


 


Total Protein   8.2 


 


Albumin   3.2 


 


Globulin   5.0 H 


 


Albumin/Globulin Ratio   0.6 L 


 


Lipase   26 


 


Urine Color   


 


Urine Clarity   


 


Urine pH   


 


Ur Specific Gravity   


 


Urine Protein   


 


Urine Glucose (UA)   


 


Urine Ketones   


 


Urine Occult Blood   


 


Urine Nitrite   


 


Urine Bilirubin   


 


Urine Urobilinogen   


 


Ur Leukocyte Esterase   


 


Ur Microscopic Review   


 


Urine Culture Comments   


 


Serum Ketones   














  04/21/21 04/21/21 04/21/21





  13:07 13:07 13:59


 


WBC   


 


RBC   


 


Hgb   


 


Hct   


 


MCV   


 


MCH   


 


MCHC   


 


RDW   


 


Plt Count   


 


MPV   


 


Neut # (Auto)   


 


Lymph # (Auto)   


 


Mono # (Auto)   


 


Eos # (Auto)   


 


Baso # (Auto)   


 


Absolute Nucleated RBC   


 


Nucleated RBC %   


 


PT   13.2 H 


 


INR   1.2 


 


VBG pH    7.385


 


VBG pCO2    49.2


 


VBG pO2    24.2 L


 


VBG HCO3    28.8 H


 


VBG Total CO2    30.3 H


 


VBG O2 Saturation    46.8 L


 


VBG Base Excess    3.0 H


 


Sodium   


 


Potassium   


 


Chloride   


 


Carbon Dioxide   


 


Anion Gap   


 


BUN   


 


Creatinine   


 


Estimated GFR (MDRD)   


 


Glucose   


 


Calcium   


 


Total Bilirubin   


 


AST   


 


ALT   


 


Alkaline Phosphatase   


 


Troponin I High Sens   


 


Total Protein   


 


Albumin   


 


Globulin   


 


Albumin/Globulin Ratio   


 


Lipase   


 


Urine Color   


 


Urine Clarity   


 


Urine pH   


 


Ur Specific Gravity   


 


Urine Protein   


 


Urine Glucose (UA)   


 


Urine Ketones   


 


Urine Occult Blood   


 


Urine Nitrite   


 


Urine Bilirubin   


 


Urine Urobilinogen   


 


Ur Leukocyte Esterase   


 


Ur Microscopic Review   


 


Urine Culture Comments   


 


Serum Ketones  NEGATIVE  














  04/21/21





  14:38


 


WBC 


 


RBC 


 


Hgb 


 


Hct 


 


MCV 


 


MCH 


 


MCHC 


 


RDW 


 


Plt Count 


 


MPV 


 


Neut # (Auto) 


 


Lymph # (Auto) 


 


Mono # (Auto) 


 


Eos # (Auto) 


 


Baso # (Auto) 


 


Absolute Nucleated RBC 


 


Nucleated RBC % 


 


PT 


 


INR 


 


VBG pH 


 


VBG pCO2 


 


VBG pO2 


 


VBG HCO3 


 


VBG Total CO2 


 


VBG O2 Saturation 


 


VBG Base Excess 


 


Sodium 


 


Potassium 


 


Chloride 


 


Carbon Dioxide 


 


Anion Gap 


 


BUN 


 


Creatinine 


 


Estimated GFR (MDRD) 


 


Glucose 


 


Calcium 


 


Total Bilirubin 


 


AST 


 


ALT 


 


Alkaline Phosphatase 


 


Troponin I High Sens 


 


Total Protein 


 


Albumin 


 


Globulin 


 


Albumin/Globulin Ratio 


 


Lipase 


 


Urine Color  YELLOW


 


Urine Clarity  CLEAR


 


Urine pH  6.0


 


Ur Specific Gravity  <=1.005


 


Urine Protein  NEGATIVE


 


Urine Glucose (UA)  >=1000 H


 


Urine Ketones  NEGATIVE


 


Urine Occult Blood  NEGATIVE


 


Urine Nitrite  NEGATIVE


 


Urine Bilirubin  NEGATIVE


 


Urine Urobilinogen  0.2 (NORMAL)


 


Ur Leukocyte Esterase  NEGATIVE


 


Ur Microscopic Review  NOT INDICATED


 


Urine Culture Comments  NOT INDICATED


 


Serum Ketones 














PD MEDICAL DECISION MAKING





- ED course


Complexity details: reviewed results, re-evaluated patient, considered 

differential, d/w patient


ED course: 


The patient was worked up with laboratory studies and found to have a blood 

sugar of 710.  Her ABG did not show acidosis, and she did not have an anion gap.

 Patient was given 2 L of 0.9 normal saline as well as 12 units of regular 

insulin IV.  I discussed the case with Dr. Hercules, who agreed to admit the pt

 to her service.  She requested insulin drip, which was ordered from the ED, and

 plan will be ICU admission.








Departure





- Departure


Disposition: ED Place in Observation


Clinical Impression: 


Uncontrolled diabetes mellitus


Qualifiers:


 Diabetes mellitus type: type 2 Glycemic state: with hyperglycemia Qualified 

Code(s): E11.65 - Type 2 diabetes mellitus with hyperglycemia





Condition: Serious


Discharge Date/Time: 04/21/21 15:25

## 2021-04-22 LAB
ANION GAP SERPL CALCULATED.4IONS-SCNC: 13 MMOL/L (ref 6–13)
BASOPHILS NFR BLD AUTO: 0 10^3/UL (ref 0–0.1)
BASOPHILS NFR BLD AUTO: 0.3 %
BUN SERPL-MCNC: 12 MG/DL (ref 6–20)
CALCIUM UR-MCNC: 9.3 MG/DL (ref 8.5–10.3)
CHLORIDE SERPL-SCNC: 96 MMOL/L (ref 101–111)
CO2 SERPL-SCNC: 28 MMOL/L (ref 21–32)
CREAT SERPLBLD-SCNC: 0.7 MG/DL (ref 0.4–1)
EOSINOPHIL # BLD AUTO: 0.4 10^3/UL (ref 0–0.7)
EOSINOPHIL NFR BLD AUTO: 3 %
ERYTHROCYTE [DISTWIDTH] IN BLOOD BY AUTOMATED COUNT: 13.1 % (ref 12–15)
EST. AVERAGE GLUCOSE BLD GHB EST-MCNC: 301 MG/DL (ref 70–100)
FOLATE SERPL-MCNC: 17.72 NG/ML
GFRSERPLBLD MDRD-ARVRAT: 105 ML/MIN/{1.73_M2} (ref 89–?)
GLUCOSE SERPL-MCNC: 215 MG/DL (ref 70–100)
HBA1C MFR BLD HPLC: 12.1 % (ref 4.27–6.07)
HCT VFR BLD AUTO: 35.5 % (ref 37–47)
HGB UR QL STRIP: 11.1 G/DL (ref 12–16)
IRON SATN MFR SERPL: 3 % (ref 20–50)
IRON SERPL-MCNC: 8 UG/DL (ref 28–170)
LYMPHOCYTES # SPEC AUTO: 2.9 10^3/UL (ref 1.5–3.5)
LYMPHOCYTES NFR BLD AUTO: 24.7 %
MCH RBC QN AUTO: 29 PG (ref 27–31)
MCHC RBC AUTO-ENTMCNC: 31.3 G/DL (ref 32–36)
MCV RBC AUTO: 92.7 FL (ref 81–99)
MONOCYTES # BLD AUTO: 1.4 10^3/UL (ref 0–1)
MONOCYTES NFR BLD AUTO: 12 %
NEUTROPHILS # BLD AUTO: 6.9 10^3/UL (ref 1.5–6.6)
NEUTROPHILS # SNV AUTO: 11.6 X10^3/UL (ref 4.8–10.8)
NEUTROPHILS NFR BLD AUTO: 59.6 %
NRBC # BLD AUTO: 0 /100WBC
NRBC # BLD AUTO: 0 X10^3/UL
PDW BLD AUTO: 10.7 FL (ref 7.9–10.8)
PLATELET # BLD: 240 10^3/UL (ref 130–450)
POTASSIUM SERPL-SCNC: 3.7 MMOL/L (ref 3.5–5)
RBC MAR: 3.83 10^6/UL (ref 4.2–5.4)
SODIUM SERPLBLD-SCNC: 137 MMOL/L (ref 135–145)
TIBC SERPL-MCNC: 300 UG/DL (ref 250–450)
TRANSFERRIN SERPL-MCNC: 214 MG/DL (ref 192–382)
VIT B12 SERPL-MCNC: 408 PG/ML (ref 180–914)

## 2021-04-22 RX ADMIN — Medication SCH MG: at 11:12

## 2021-04-22 RX ADMIN — GABAPENTIN SCH MG: 300 CAPSULE ORAL at 20:01

## 2021-04-22 RX ADMIN — INSULIN GLARGINE SCH UNIT: 100 INJECTION, SOLUTION SUBCUTANEOUS at 20:03

## 2021-04-22 RX ADMIN — Medication SCH AMP: at 08:15

## 2021-04-22 RX ADMIN — SODIUM CHLORIDE SCH: 9 INJECTION, SOLUTION INTRAVENOUS at 11:57

## 2021-04-22 RX ADMIN — Medication SCH AMP: at 20:02

## 2021-04-22 RX ADMIN — SODIUM CHLORIDE, PRESERVATIVE FREE SCH ML: 5 INJECTION INTRAVENOUS at 16:57

## 2021-04-22 RX ADMIN — SODIUM CHLORIDE SCH: 9 INJECTION, SOLUTION INTRAVENOUS at 21:29

## 2021-04-22 RX ADMIN — SODIUM CHLORIDE, PRESERVATIVE FREE SCH ML: 5 INJECTION INTRAVENOUS at 08:27

## 2021-04-22 RX ADMIN — INSULIN ASPART SCH UNIT: 100 INJECTION, SOLUTION INTRAVENOUS; SUBCUTANEOUS at 20:06

## 2021-04-22 RX ADMIN — FAMOTIDINE SCH MG: 20 TABLET, FILM COATED ORAL at 20:02

## 2021-04-22 RX ADMIN — ACETAMINOPHEN PRN MG: 325 TABLET ORAL at 16:10

## 2021-04-22 RX ADMIN — INSULIN ASPART SCH UNIT: 100 INJECTION, SOLUTION INTRAVENOUS; SUBCUTANEOUS at 12:01

## 2021-04-22 RX ADMIN — GABAPENTIN SCH MG: 300 CAPSULE ORAL at 11:59

## 2021-04-22 RX ADMIN — INSULIN ASPART SCH UNIT: 100 INJECTION, SOLUTION INTRAVENOUS; SUBCUTANEOUS at 16:56

## 2021-04-22 RX ADMIN — SODIUM CHLORIDE SCH MLS/HR: 900 INJECTION INTRAVENOUS at 16:30

## 2021-04-22 RX ADMIN — GABAPENTIN SCH MG: 300 CAPSULE ORAL at 08:09

## 2021-04-22 RX ADMIN — FAMOTIDINE SCH MG: 20 TABLET, FILM COATED ORAL at 08:09

## 2021-04-22 RX ADMIN — SODIUM CHLORIDE, PRESERVATIVE FREE SCH ML: 5 INJECTION INTRAVENOUS at 01:27

## 2021-04-22 RX ADMIN — INSULIN ASPART SCH UNIT: 100 INJECTION, SOLUTION INTRAVENOUS; SUBCUTANEOUS at 08:25

## 2021-04-22 NOTE — XRAY REPORT
PROCEDURE:  Chest 1 View X-Ray

 

INDICATIONS:  fever, new cough

 

TECHNIQUE:  One view of the chest was acquired.  

 

COMPARISON:  4/21/2021

 

FINDINGS:  

 

Surgical changes and devices:  None.  

 

Lungs and pleura:  No pleural effusions or pneumothorax. Subtle increased opacity in right perihilar 
region and right upper lung field is seen concerning for developing right-sided infiltrates. Left francisco
g remains clear.

 

Mediastinum:  Mediastinal contours appear normal.  Heart size is mildly enlarged.  

 

Bones and chest wall:  No suspicious bony lesions.  Overlying soft tissues appear unremarkable.  

 

IMPRESSION:  

Finding is concerning for developing right upper lobe and right perihilar infiltrates. No pleural eff
usion or pneumothorax.

 

Reviewed by: Lacho Garcia MD on 4/22/2021 1:23 PM PDT

Approved by: Lacho Garcia MD on 4/22/2021 1:23 PM PDT

 

 

Station ID:  [id number]

## 2021-04-22 NOTE — PROVIDER PROGRESS NOTE
Assessment/Plan





- Problem List


(1) Fever


Assessment/Plan: 


Her U/A came back after in bed in ICU, as clean.


Her WBC stephanie slightly since admission.


Today, she has a cough and rhonchi, possible pneumonia.


An infection could be the cause of very elevated glu and change in taste.


Will order blood cx, sputum cx and get CXR, also will start antibx if she has 

infiltrate.








(2) Uncontrolled diabetes mellitus


Qualifiers: 


   Diabetes mellitus type: type 2   Glycemic state: with hyperglycemia   

Qualified Code(s): E11.65 - Type 2 diabetes mellitus with hyperglycemia   


Assessment/Plan: 


The fever and an infection somewhere is the likely cause of her very high 

glucoses.


She is off Insulin drip. Glu running in high 200's-300's.


A1c is 12.1 indicating longstanding poor glu control.


Will transfer out of ICU to a med-surg bed, telemetry not needed if Echo within 

normal limits.


Dietician to meet with her today.


Started Lantus at hs and I discuss possibly she will need to start tnew Insulin 

management as an outpt; she will think about it.


Cont cc diet, ss Insulin coverage, POC glu checks.








(3) Ageusia


Assessment/Plan: 


Possible Zinc deficit due to poor po intake for 8 days.


Zinc level was sent off (but is a send-out and will take several days).


Dietician advised to treat with Zinc empirically.


Also, her DM itself may be the cause of taste change, and her covid vaccine 

timing was just serendipity.








(4) Collapse


Assessment/Plan: 


Dehydration from elev glu and a fever are the likley cause of collapse.


Orthostatic VS were done this morning and she had slight 10 mmHg drop[ between 

each position and was tachy (heart rate 96>> 101).


Still holding her home BP meds for now.


She got iv fluids and transitioning to oral liquids today.


Echo still pending.








(5) Hypertension


Qualifiers: 


   Hypertension type: unspecified   Qualified Code(s): I10 - Essential (primary)

 hypertension   


Assessment/Plan: 


Holding her BP meds still today, due to dehydration and borderline orthostasis.








(6) Anemia


Assessment/Plan: 


Hgb was 10 at admission and is 11 today, despite iv hydration.


B12 and Folate serum levels are normal. Iron panel shows low Iron and low % sat.


Will order stool guaic and discuss her menstrual Hx with her.


Will start oral Iron.








(7) MRSA carrier


Assessment/Plan: 


She is ordered to get alcohol nasal swabs for treatment.








(8) Abnormal EKG


Assessment/Plan: 


Awaiting Echo to be done today.








(9) Hyponatremia


Assessment/Plan: 


Resolved with lower serum glu.


IV fluids stopped as she is taking liquids orally.








(10) Hypokalemia


Assessment/Plan: 


Resolved with replacement.


Follow BMP daily.








- Current Meds


Current Meds: 





                               Current Medications











Generic Name Dose Route Start Last Admin





  Trade Name Freq  PRN Reason Stop Dose Admin


 


Acetaminophen  650 mg  04/21/21 14:45  04/21/21 21:55





  Acetaminophen 325 Mg Tablet  PO   650 mg





  Q4HR PRN   Administration





  Pain or Fever > 38C (100.4F)  


 


Alcohol  1 amp  04/21/21 21:00  04/22/21 08:15





  Ethyl Alcohol 62% Swab Ampule  LENKA   1 amp





  BID DK   Administration


 


Famotidine  20 mg  04/21/21 21:00  04/22/21 08:09





  Famotidine 20 Mg Tablet  PO   20 mg





  BID DK   Administration


 


Gabapentin  1,200 mg  04/21/21 21:00  04/22/21 08:09





  Gabapentin 300 Mg Capsule  PO   1,200 mg





  BID DK   Administration


 


Sodium Chloride  1,000 mls @ 100 mls/hr  04/21/21 15:00  04/21/21 23:33





  Normal Saline 0.9%  IV   Not Given





  .Q10H DK  


 


Insulin Aspart  1 - 5 unit  04/22/21 08:00  04/22/21 08:25





  Insulin Aspart 300 Unit/3 Ml Pen  SUBQ   5 unit





  0800,1200,1700,2100 DK   Administration





  Protocol  


 


Insulin Glargine  10 unit  04/21/21 22:46  04/21/21 22:57





  Insulin Glargine 300 Unit/3 Ml Pen  SUBQ   10 unit





  QPM DK   Administration


 


Sodium Chloride  10 ml  04/21/21 17:00  04/22/21 08:27





  Sodium Chloride Flush 0.9% 10 Ml Syringe  IVP   10 ml





  0100,0900,1700 DK   Administration














- Lab Result


Fish Bone Diagrams: 


                                 04/22/21 04:05





                                 04/22/21 04:05





- Additional Planning


My Orders: 





My Active Orders





04/21/21 14:45


Activity Orders [RC] Q2HR 


Daily Weight [RC] 0600 


IO [RC] Q1HR 


IV Insert [RC] ONCE 


Initiate Bowel Care Protocol [RC] QSHIFT 


Initiate ICU Electrolyte Prot. [RC] .protocol 


Initiate Line Care Protocol [RC] .protocol 


Initiate Personal Care Protoco [RC] .protocol 


Turn and Reposition [RC] PRN 


Vital Signs [RC] Q3HR 


Acetaminophen [Tylenol]   650 mg PO Q4HR PRN 


Ondansetron Inj [Zofran Inj]   4 mg IVP Q6HR PRN 


Sodium Chloride Flush 0.9% [Normal Saline Flush 0.9%]   10 ml IVP PRN PRN 


Code Status [OTHERS] Routine 


Condition of Patient [OTHERS] Routine 


DVT Prophylaxis [OTHERS] Routine 





04/21/21 14:47


Oxygen Therapy [RC] .PRN 


SCDs [RC] QSHIFT 


Telemetry- [RC] Q4HR 





04/21/21 14:48


Initiate Line Care Protocol [RC] QSHIFT 





04/21/21 14:54


Initiate DKA RN Protocol [RC] .protocol 


Initiate Hypoglycemia Protocol [RC] .protocol 


Initiate ICU Electrolyte Prot. [RC] .protocol 


HCG QUALITATIVE, URINE [URIN] Routine 





04/21/21 14:59


Notify Provider - Specific Ins [RC] PRN 





04/21/21 15:00


Sodium Chloride 0.9% [Normal Saline 0.9%] 1,000 ml  mls/hr 





04/21/21 Dinner


Carb-controlled Diet [DIET] 





04/21/21 17:00


Sodium Chloride Flush 0.9% [Normal Saline Flush 0.9%]   10 ml IVP 0100,0900,1700







04/21/21 21:00


Famotidine [Pepcid]   20 mg PO BID 


ethyl alcohoL 62% swab [NoZin]   1 amp LENKA BID 





04/22/21 04:05


HEMOGLOBIN A1c% [CHEM] Urgent 





04/22/21 08:00


Orthostatic [Vital Signs - Orthostatic] [RC] DAILY 


Echo Transthoracic Complete [ECHO] Routine 





04/22/21 08:50


ZINC, PLASMA [REFLAB] Routine 





04/22/21 11:00


Zinc Sulfate   220 mg PO DAILY 





04/23/21 05:00


BMP - BASIC METABOLIC PANEL [CHEM] DAILYLAB 














Subjective





- Subjective


Patient Reports: Resting Comfortably, Cough (feels like she cannot bring 

anything up), Fatigue, Other





Objective


Vital Signs: 





                               Vital Signs - 24 hr











  04/21/21 04/21/21 04/21/21





  12:40 13:30 14:00


 


Temperature 36.4 C L  


 


Heart Rate 100 100 102 H


 


Heart Rate [   





Monitoring   





electrodes]   


 


Respiratory 16 16 16





Rate   


 


Blood Pressure 115/102 H 143/79 H 135/109 H


 


Blood Pressure   





[Left Brachial   





artery]   


 


Blood Pressure   





[Right Brachial   





artery]   


 


O2 Saturation 100 96 96














  04/21/21 04/21/21 04/21/21





  15:39 18:00 21:00


 


Temperature 38 C H  38.7 C H


 


Heart Rate   


 


Heart Rate [ 102 H 108 H 103 H





Monitoring   





electrodes]   


 


Respiratory 23 22 22





Rate   


 


Blood Pressure   


 


Blood Pressure 135/109 H 130/109 H 132/93 H





[Left Brachial   





artery]   


 


Blood Pressure   





[Right Brachial   





artery]   


 


O2 Saturation 99 94 97














  04/21/21 04/22/21 04/22/21





  22:36 00:00 02:00


 


Temperature 37.7 C  


 


Heart Rate   


 


Heart Rate [  93 96





Monitoring   





electrodes]   


 


Respiratory  19 16





Rate   


 


Blood Pressure   


 


Blood Pressure  122/68 116/66





[Left Brachial   





artery]   


 


Blood Pressure   





[Right Brachial   





artery]   


 


O2 Saturation  92 95














  04/22/21 04/22/21 04/22/21





  03:49 06:00 08:17


 


Temperature 37.0 C  37.6 C


 


Heart Rate   


 


Heart Rate [  94 93





Monitoring   





electrodes]   


 


Respiratory  22 13





Rate   


 


Blood Pressure   


 


Blood Pressure  120/82 H 





[Left Brachial   





artery]   


 


Blood Pressure   138/65 H





[Right Brachial   





artery]   


 


O2 Saturation  97 98








                                     Oxygen











O2 Source                      Room air














I&O (Last 24 Hrs): 





                          Intake and Output Totals x24h











 04/20/21 04/21/21 04/22/21





 23:59 23:59 23:59


 


Intake Total  2540.000 600


 


Output Total  50 


 


Balance  2490.000 600











General: Alert, Oriented x3


HEENT: Mucous membr. moist/pink, Other (appears very tired)


Neck: Supple, No JVD


Neuro: Alert, Non Focal


Cardiovascular: Regular rate, No murmurs


Respiratory: Rhonchi (L base posteriorly)


Abdomen: Soft, No tenderness


Extremities: No edema


Skin: No rashes





- Results


Results: 





                               Laboratory Results











WBC  11.6 x10^3/uL (4.8-10.8)  H  04/22/21  04:05    


 


RBC  3.83 10^6/uL (4.20-5.40)  L  04/22/21  04:05    


 


Hgb  11.1 g/dL (12.0-16.0)  L  04/22/21  04:05    


 


Hct  35.5 % (37.0-47.0)  L  04/22/21  04:05    


 


MCV  92.7 fL (81.0-99.0)   04/22/21  04:05    


 


MCH  29.0 pg (27.0-31.0)   04/22/21  04:05    


 


MCHC  31.3 g/dL (32.0-36.0)  L  04/22/21  04:05    


 


RDW  13.1 % (12.0-15.0)   04/22/21  04:05    


 


Plt Count  240 10^3/uL (130-450)   04/22/21  04:05    


 


MPV  10.7 fL (7.9-10.8)   04/22/21  04:05    


 


Neut # (Auto)  6.9 10^3/uL (1.5-6.6)  H  04/22/21  04:05    


 


Lymph # (Auto)  2.9 10^3/uL (1.5-3.5)   04/22/21  04:05    


 


Mono # (Auto)  1.4 10^3/uL (0.0-1.0)  H  04/22/21  04:05    


 


Eos # (Auto)  0.4 10^3/uL (0.0-0.7)   04/22/21  04:05    


 


Baso # (Auto)  0.0 10^3/uL (0.0-0.1)   04/22/21  04:05    


 


Absolute Nucleated RBC  0.00 x10^3/uL  04/22/21  04:05    


 


Nucleated RBC %  0.0 /100WBC  04/22/21  04:05    


 


PT  13.2 secs (9.9-12.6)  H  04/21/21  13:07    


 


INR  1.2  (0.8-1.2)   04/21/21  13:07    


 


VBG pH  7.385  (7.31-7.41)   04/21/21  13:59    


 


VBG pCO2  49.2 mmHg (41-51)   04/21/21  13:59    


 


VBG pO2  24.2 mmHg (25-47)  L  04/21/21  13:59    


 


VBG HCO3  28.8 mmol/L (23-28)  H  04/21/21  13:59    


 


VBG Total CO2  30.3 mmol/L (24-29)  H  04/21/21  13:59    


 


VBG O2 Saturation  46.8 % (60-80)  L  04/21/21  13:59    


 


VBG Base Excess  3.0 mmol/L (-2 - +2)  H  04/21/21  13:59    


 


Sodium  137 mmol/L (135-145)   04/22/21  04:05    


 


Potassium  3.7 mmol/L (3.5-5.0)   04/22/21  04:05    


 


Chloride  96 mmol/L (101-111)  L  04/22/21  04:05    


 


Carbon Dioxide  28 mmol/L (21-32)   04/22/21  04:05    


 


Anion Gap  13.0  (6-13)   04/22/21  04:05    


 


BUN  12 mg/dL (6-20)   04/22/21  04:05    


 


Creatinine  0.7 mg/dL (0.4-1.0)   04/22/21  04:05    


 


Estimated GFR (MDRD)  105  (>89)   04/22/21  04:05    


 


Glucose  215 mg/dL ()  H  04/22/21  04:05    


 


POC Whole Bld Glucose  328 mg/dL (70 - 100)  H  04/22/21  08:13    


 


Calcium  9.3 mg/dL (8.5-10.3)   04/22/21  04:05    


 


Phosphorus  3.2 mg/dL (2.5-4.6)   04/21/21  15:32    


 


Magnesium  2.0 mg/dL (1.7-2.8)   04/21/21  18:19    


 


Iron  8 ug/dL ()  L  04/22/21  04:05    


 


TIBC  300 ug/dL (250-450)   04/22/21  04:05    


 


% Saturation  3 % (20-50)  L  04/22/21  04:05    


 


Transferrin  214 mg/dL (192-382)   04/22/21  04:05    


 


Ferritin  154.7 ng/mL (11.0-306.8)   04/22/21  04:05    


 


Total Bilirubin  0.5 mg/dL (0.2-1.0)   04/21/21  13:07    


 


AST  30 IU/L (10-42)   04/21/21  13:07    


 


ALT  22 IU/L (10-60)   04/21/21  13:07    


 


Alkaline Phosphatase  88 IU/L ()   04/21/21  13:07    


 


Troponin I High Sens  8.0 ng/L (2.3-14.8)   04/21/21  17:00    


 


Total Protein  8.2 g/dL (6.7-8.2)   04/21/21  13:07    


 


Albumin  3.2 g/dL (3.2-5.5)   04/21/21  13:07    


 


Globulin  5.0 g/dL (2.1-4.2)  H  04/21/21  13:07    


 


Albumin/Globulin Ratio  0.6  (1.0-2.2)  L  04/21/21  13:07    


 


Lipase  26 U/L (22-51)   04/21/21  13:07    


 


Vitamin B12  408 pg/mL (180-914)   04/22/21  04:05    


 


Folate  17.72 ng/mL (5.90 - >24.8)   04/22/21  04:05    


 


Urine Color  YELLOW   04/21/21  14:38    


 


Urine Clarity  CLEAR  (CLEAR)   04/21/21  14:38    


 


Urine pH  6.0 PH (5.0-7.5)   04/21/21  14:38    


 


Ur Specific Gravity  <=1.005  (1.002-1.030)   04/21/21  14:38    


 


Urine Protein  NEGATIVE mg/dL (NEGATIVE)   04/21/21  14:38    


 


Urine Glucose (UA)  >=1000 mg/dL (NEGATIVE)  H  04/21/21  14:38    


 


Urine Ketones  NEGATIVE mg/dL (NEGATIVE)   04/21/21  14:38    


 


Urine Occult Blood  NEGATIVE  (NEGATIVE)   04/21/21  14:38    


 


Urine Nitrite  NEGATIVE  (NEGATIVE)   04/21/21  14:38    


 


Urine Bilirubin  NEGATIVE  (NEGATIVE)   04/21/21  14:38    


 


Urine Urobilinogen  0.2 (NORMAL) E.U./dL (NORMAL)   04/21/21  14:38    


 


Ur Leukocyte Esterase  NEGATIVE  (NEGATIVE)   04/21/21  14:38    


 


Ur Microscopic Review  NOT INDICATED   04/21/21  14:38    


 


Urine Culture Comments  NOT INDICATED   04/21/21  14:38    


 


Nasal Adenovirus (PCR)  NOT DETECTED   04/21/21  14:44    


 


Nasal B. parapertussis DNA (PCR)  NOT DETECTED   04/21/21  14:44    


 


Nasal Coronavir 229E PCR  NOT DETECTED   04/21/21  14:44    


 


Nasal Coronavir HKU1 PCR  NOT DETECTED   04/21/21  14:44    


 


Nasal Coronavir NL63 PCR  NOT DETECTED   04/21/21  14:44    


 


Nasal Coronavir OC43 PCR  NOT DETECTED   04/21/21  14:44    


 


Nasal Enterovir/Rhinovir PCR  NOT DETECTED   04/21/21  14:44    


 


Nasal Influenza B PCR  NOT DETECTED   04/21/21  14:44    


 


Nasal Influenza A PCR  NOT DETECTED   04/21/21  14:44    


 


Nasal Parainfluen 1 PCR  NOT DETECTED   04/21/21  14:44    


 


Nasal Parainfluen 2 PCR  NOT DETECTED   04/21/21  14:44    


 


Nasal Parainfluen 3 PCR  NOT DETECTED   04/21/21  14:44    


 


Nasal Parainfluen 4 PCR  NOT DETECTED   04/21/21  14:44    


 


Nasal RSV (PCR)  NOT DETECTED   04/21/21  14:44    


 


Nasal Screen MRSA (PCR)  POSITIVE  (NEGATIVE)  A*  04/21/21  15:30    


 


Nasal B.pertussis DNA PCR  NOT DETECTED   04/21/21  14:44    


 


Nasal C.pneumoniae (PCR)  NOT DETECTED   04/21/21  14:44    


 


[place] Human Metapneumo PCR  NOT DETECTED   04/21/21  14:44    


 


Nasal M.pneumoniae (PCR)  NOT DETECTED   04/21/21  14:44    


 


Nasal SARS-CoV-2 (PCR)  NOT DETECTED   04/21/21  14:44    


 


Serum Ketones  NEGATIVE  (NEGATIVE)   04/21/21  13:07

## 2021-04-23 LAB
ANION GAP SERPL CALCULATED.4IONS-SCNC: 11 MMOL/L (ref 6–13)
ANION GAP SERPL CALCULATED.4IONS-SCNC: 14 MMOL/L (ref 6–13)
BASOPHILS NFR BLD AUTO: 0.1 10^3/UL (ref 0–0.1)
BASOPHILS NFR BLD AUTO: 0.5 %
BUN SERPL-MCNC: 11 MG/DL (ref 6–20)
BUN SERPL-MCNC: 12 MG/DL (ref 6–20)
CALCIUM UR-MCNC: 9.1 MG/DL (ref 8.5–10.3)
CALCIUM UR-MCNC: 9.2 MG/DL (ref 8.5–10.3)
CHLORIDE SERPL-SCNC: 94 MMOL/L (ref 101–111)
CHLORIDE SERPL-SCNC: 98 MMOL/L (ref 101–111)
CO2 SERPL-SCNC: 29 MMOL/L (ref 21–32)
CO2 SERPL-SCNC: 29 MMOL/L (ref 21–32)
CREAT SERPLBLD-SCNC: 0.8 MG/DL (ref 0.4–1)
CREAT SERPLBLD-SCNC: 0.8 MG/DL (ref 0.4–1)
EOSINOPHIL # BLD AUTO: 0.3 10^3/UL (ref 0–0.7)
EOSINOPHIL NFR BLD AUTO: 3.2 %
ERYTHROCYTE [DISTWIDTH] IN BLOOD BY AUTOMATED COUNT: 13 % (ref 12–15)
GFRSERPLBLD MDRD-ARVRAT: 90 ML/MIN/{1.73_M2} (ref 89–?)
GFRSERPLBLD MDRD-ARVRAT: 90 ML/MIN/{1.73_M2} (ref 89–?)
GLUCOSE SERPL-MCNC: 280 MG/DL (ref 70–100)
GLUCOSE SERPL-MCNC: 418 MG/DL (ref 70–100)
HCT VFR BLD AUTO: 33.5 % (ref 37–47)
HGB UR QL STRIP: 10.9 G/DL (ref 12–16)
LYMPHOCYTES # SPEC AUTO: 1.5 10^3/UL (ref 1.5–3.5)
LYMPHOCYTES NFR BLD AUTO: 16.8 %
MCH RBC QN AUTO: 29.7 PG (ref 27–31)
MCHC RBC AUTO-ENTMCNC: 32.5 G/DL (ref 32–36)
MCV RBC AUTO: 91.3 FL (ref 81–99)
MONOCYTES # BLD AUTO: 1.1 10^3/UL (ref 0–1)
MONOCYTES NFR BLD AUTO: 12.3 %
NEUTROPHILS # BLD AUTO: 6.1 10^3/UL (ref 1.5–6.6)
NEUTROPHILS # SNV AUTO: 9.1 X10^3/UL (ref 4.8–10.8)
NEUTROPHILS NFR BLD AUTO: 66.9 %
NRBC # BLD AUTO: 0 /100WBC
NRBC # BLD AUTO: 0 X10^3/UL
PDW BLD AUTO: 9.8 FL (ref 7.9–10.8)
PHOSPHATE BLD-MCNC: 3.7 MG/DL (ref 2.5–4.6)
PLATELET # BLD: 287 10^3/UL (ref 130–450)
POTASSIUM SERPL-SCNC: 3.4 MMOL/L (ref 3.5–5)
POTASSIUM SERPL-SCNC: 3.5 MMOL/L (ref 3.5–5)
RBC MAR: 3.67 10^6/UL (ref 4.2–5.4)
SODIUM SERPLBLD-SCNC: 137 MMOL/L (ref 135–145)
SODIUM SERPLBLD-SCNC: 138 MMOL/L (ref 135–145)

## 2021-04-23 RX ADMIN — INSULIN ASPART SCH UNIT: 100 INJECTION, SOLUTION INTRAVENOUS; SUBCUTANEOUS at 17:37

## 2021-04-23 RX ADMIN — INSULIN GLARGINE SCH UNIT: 100 INJECTION, SOLUTION SUBCUTANEOUS at 21:33

## 2021-04-23 RX ADMIN — GABAPENTIN SCH MG: 300 CAPSULE ORAL at 08:01

## 2021-04-23 RX ADMIN — GABAPENTIN SCH MG: 300 CAPSULE ORAL at 21:29

## 2021-04-23 RX ADMIN — Medication SCH MG: at 07:55

## 2021-04-23 RX ADMIN — Medication SCH AMP: at 08:00

## 2021-04-23 RX ADMIN — SODIUM CHLORIDE, PRESERVATIVE FREE SCH ML: 5 INJECTION INTRAVENOUS at 17:02

## 2021-04-23 RX ADMIN — FAMOTIDINE SCH MG: 20 TABLET, FILM COATED ORAL at 08:01

## 2021-04-23 RX ADMIN — SODIUM CHLORIDE, PRESERVATIVE FREE SCH ML: 5 INJECTION INTRAVENOUS at 08:06

## 2021-04-23 RX ADMIN — GLIMEPIRIDE SCH MG: 2 TABLET ORAL at 11:41

## 2021-04-23 RX ADMIN — GABAPENTIN SCH MG: 300 CAPSULE ORAL at 11:40

## 2021-04-23 RX ADMIN — SODIUM CHLORIDE SCH MLS/HR: 900 INJECTION INTRAVENOUS at 08:07

## 2021-04-23 RX ADMIN — INSULIN ASPART SCH UNIT: 100 INJECTION, SOLUTION INTRAVENOUS; SUBCUTANEOUS at 07:53

## 2021-04-23 RX ADMIN — INSULIN ASPART SCH UNIT: 100 INJECTION, SOLUTION INTRAVENOUS; SUBCUTANEOUS at 11:41

## 2021-04-23 RX ADMIN — ACETAMINOPHEN PRN MG: 325 TABLET ORAL at 01:40

## 2021-04-23 RX ADMIN — SODIUM CHLORIDE, PRESERVATIVE FREE SCH ML: 5 INJECTION INTRAVENOUS at 01:33

## 2021-04-23 RX ADMIN — SODIUM CHLORIDE SCH: 9 INJECTION, SOLUTION INTRAVENOUS at 06:25

## 2021-04-23 RX ADMIN — AZITHROMYCIN MONOHYDRATE SCH MG: 250 TABLET ORAL at 08:17

## 2021-04-23 RX ADMIN — Medication SCH AMP: at 21:29

## 2021-04-23 RX ADMIN — INSULIN ASPART SCH UNIT: 100 INJECTION, SOLUTION INTRAVENOUS; SUBCUTANEOUS at 21:34

## 2021-04-23 RX ADMIN — Medication SCH MG: at 08:01

## 2021-04-23 RX ADMIN — FAMOTIDINE SCH MG: 20 TABLET, FILM COATED ORAL at 21:29

## 2021-04-23 RX ADMIN — PIOGLITAZONE SCH MG: 15 TABLET ORAL at 11:41

## 2021-04-23 NOTE — PROVIDER PROGRESS NOTE
Assessment/Plan





- Problem List


(1) Fever


Assessment/Plan: 


He spiked a fever again overnight at 0200 today.  The source is probably the 

pneumonia.


We now have several sets of blood cultures that are pending.


Continue Zithromax and Ceftriaxone


She will be admitted from Observation to full Inpatient status.  No discharge 

until she is afebrile for at least 24 hours.








(2) CAP (community acquired pneumonia)


Assessment/Plan: 


As above in #1.


This infection is probably the cause of the very elevated serum glucose and poor

diabetic control


We will add Mucinex for expectoration.








(3) Uncontrolled diabetes mellitus


Qualifiers: 


   Diabetes mellitus type: type 2   Glycemic state: with hyperglycemia   

Qualified Code(s): E11.65 - Type 2 diabetes mellitus with hyperglycemia   


Assessment/Plan: 


A1c is 12.1 indicating longstanding poor glu control.


Dietician met with her yesterday.  


We started Lantus at hs.  The patient is not interested in starting Insulin, 

despite such poor 3 mos glu control.


Cont cc diet, ss Insulin coverage, POC glu checks.








(4) Ageusia


Assessment/Plan: 


Possible Zinc deficit due to poor po intake for 8 days.


Zinc level was sent off (but is a send-out and will take several days).


Dietician advised to treat with Zinc empirically.


Also, her DM itself may be the cause of taste change, and her covid vaccine 

timing was just serendipity.








(5) Collapse


Assessment/Plan: 


Dehydration from elev glu and a fever are the likley cause of collapse.


Orthostatic VS were done and she had slight 20 mmHg drop


We are still holding her home BP meds for now.


She got iv fluids and has transitioned to oral liquids to stay hydrtated.


Echo was essentially WNL.








(6) Hypertension


Qualifiers: 


   Hypertension type: unspecified   Qualified Code(s): I10 - Essential (primary)

hypertension   


Assessment/Plan: 


Holding her BP meds still today, due to orthostasis.








(7) Anemia


Assessment/Plan: 


Hgb was 10 at admission


B12 and Folate serum levels are normal. Iron panel shows low Iron and low % sat.


Will order stool guaic and discuss her menstrual Hx with her.


We started oral Iron replacement.








(8) MRSA carrier


Assessment/Plan: 


She is ordered to get alcohol nasal swabs for treatment.








(9) Abnormal EKG


Assessment/Plan: 


Echo was  normal.








(10) Hyponatremia


Assessment/Plan: 


Resolved








(11) Hypokalemia


Assessment/Plan: 


Resolved








- Current Meds


Current Meds: 





                               Current Medications











Generic Name Dose Route Start Last Admin





  Trade Name Freq  PRN Reason Stop Dose Admin


 


Acetaminophen  650 mg  04/21/21 14:45  04/23/21 01:40





  Acetaminophen 325 Mg Tablet  PO   650 mg





  Q4HR PRN   Administration





  Pain or Fever > 38C (100.4F)  


 


Alcohol  1 amp  04/21/21 21:00  04/23/21 08:00





  Ethyl Alcohol 62% Swab Ampule  LENKA   1 amp





  BID DK   Administration


 


Azithromycin  250 mg  04/23/21 09:00  04/23/21 08:17





  Azithromycin 250 Mg Tablet  PO  04/26/21 09:01  250 mg





  DAILY DK   Administration


 


Famotidine  20 mg  04/21/21 21:00  04/23/21 08:01





  Famotidine 20 Mg Tablet  PO   20 mg





  BID DK   Administration


 


Ferrous Sulfate  300 mg  04/23/21 08:00  04/23/21 07:55





  Ferrous Sulfate 300 Mg/5 Ml Udc  PO   300 mg





  DAILYWM DK   Administration


 


Gabapentin  1,200 mg  04/21/21 21:00  04/23/21 08:01





  Gabapentin 300 Mg Capsule  PO   1,200 mg





  BID DK   Administration


 


Gabapentin  600 mg  04/22/21 12:00  04/23/21 11:40





  Gabapentin 300 Mg Capsule  PO   600 mg





  1200 DK   Administration


 


Glimepiride  2 mg  04/23/21 11:00  04/23/21 11:41





  Glimepiride 2 Mg Tablet  PO   2 mg





  DAILYWM DK   Administration


 


Ceftriaxone Sodium 2 gm/  100 mls @ 200 mls/hr  04/22/21 14:13  04/23/21 08:37





  Sodium Chloride  IV  04/26/21 09:29  Infused





  DAILY DK   Infusion


 


Insulin Aspart  2 - 10 unit  04/23/21 17:00  04/23/21 17:37





  Insulin Aspart 300 Unit/3 Ml Pen  SUBQ   4 unit





  0800,1200,1700,2100 DK   Administration





  Protocol  


 


Insulin Glargine  10 unit  04/21/21 22:46  04/22/21 20:03





  Insulin Glargine 300 Unit/3 Ml Pen  SUBQ   10 unit





  QPM DK   Administration


 


Ondansetron HCl  4 mg  04/21/21 14:45  04/23/21 17:02





  Ondansetron 4 Mg/2 Ml Vial  IVP   4 mg





  Q6HR PRN   Administration





  Nausea / Vomiting  


 


Pioglitazone HCl  30 mg  04/23/21 11:00  04/23/21 11:41





  Pioglitazone 15 Mg Tablet  PO   30 mg





  DAILY DK   Administration


 


Sodium Chloride  10 ml  04/21/21 17:00  04/23/21 17:02





  Sodium Chloride Flush 0.9% 10 Ml Syringe  IVP   10 ml





  0100,0900,1700 DK   Administration


 


Zinc Sulfate  220 mg  04/22/21 11:00  04/23/21 08:01





  Zinc Sulfate 220 Mg Capsule  PO  04/27/21 11:00  220 mg





  DAILY DK   Administration














- Lab Result


Fish Bone Diagrams: 


                                 04/23/21 08:30





                                 04/23/21 08:30





- Additional Planning


My Orders: 





My Active Orders





04/22/21 18:06


CULTURE, BLOOD #1 [RM] Stat 





04/22/21 18:16


CULTURE, BLOOD #2 [RM] Stat 





04/23/21 08:00


Ferrous Sulfate Liquid [Feosol Liquid]   300 mg PO DAILYWM 





04/23/21 09:00


Azithromycin [Zithromax]   250 mg PO DAILY 





04/23/21 11:00


Glimepiride [Amaryl]   2 mg PO DAILYWM 


Pioglitazone [Actos]   30 mg PO DAILY 





04/23/21 17:00


Insulin Aspart [NovoLOG]   2 - 10 unit SUBQ 0800,1200,1700,2100 














Subjective





- Subjective


Patient Reports: Feeling Better (Less cough, still tired, after having another 

fever spike last night), Fatigue





Objective


Vital Signs: 





                               Vital Signs - 24 hr











  04/22/21 04/22/21 04/22/21





  18:00 19:15 23:32


 


Temperature 36.9 C 36.8 C 38.1 C H


 


Heart Rate [ 88 83 95





Monitoring   





electrodes]   


 


Respiratory 16 20 20





Rate   


 


Blood Pressure 130/66 132/69 H 155/79 H





[Right Brachial   





artery]   


 


O2 Saturation 96 95 98














  04/23/21 04/23/21 04/23/21





  01:41 02:52 03:00


 


Temperature 38.7 C H 38.5 C H 37.3 C


 


Heart Rate [ 95 88 87





Monitoring   





electrodes]   


 


Respiratory   20





Rate   


 


Blood Pressure   135/75 H





[Right Brachial   





artery]   


 


O2 Saturation   95














  04/23/21 04/23/21 04/23/21





  06:00 08:16 11:38


 


Temperature 37.1 C 36.7 C 36.6 C


 


Heart Rate [ 88 82 85





Monitoring   





electrodes]   


 


Respiratory 10 L 18 18





Rate   


 


Blood Pressure 145/85 H 158/71 H 120/57 L





[Right Brachial   





artery]   


 


O2 Saturation 97 94 95














  04/23/21 04/23/21





  11:59 15:00


 


Temperature 36.6 C 37.7 C


 


Heart Rate [  85





Monitoring  





electrodes]  


 


Respiratory  18





Rate  


 


Blood Pressure  131/61 H





[Right Brachial  





artery]  


 


O2 Saturation  96








                                     Oxygen











O2 Source                      Room air














I&O (Last 24 Hrs): 





                          Intake and Output Totals x24h











 04/21/21 04/22/21 04/23/21





 23:59 23:59 23:59


 


Intake Total 2540.000 1680 1150


 


Output Total 50 0 


 


Balance 2490.000 1680 1150











General: Alert, Oriented x3


HEENT: EOMI, Mucous membr. moist/pink


Neck: Supple, No JVD


Neuro: Alert, Non Focal


Cardiovascular: Regular rate, No murmurs


Respiratory: No respiratory distress, Rhonchi (both bases)


Abdomen: Soft, No tenderness


Extremities: No edema





- Results


Results: 





                               Laboratory Results











WBC  9.1 x10^3/uL (4.8-10.8)   04/23/21  08:30    


 


RBC  3.67 10^6/uL (4.20-5.40)  L  04/23/21  08:30    


 


Hgb  10.9 g/dL (12.0-16.0)  L  04/23/21  08:30    


 


Hct  33.5 % (37.0-47.0)  L  04/23/21  08:30    


 


MCV  91.3 fL (81.0-99.0)   04/23/21  08:30    


 


MCH  29.7 pg (27.0-31.0)   04/23/21  08:30    


 


MCHC  32.5 g/dL (32.0-36.0)   04/23/21  08:30    


 


RDW  13.0 % (12.0-15.0)   04/23/21  08:30    


 


Plt Count  287 10^3/uL (130-450)   04/23/21  08:30    


 


MPV  9.8 fL (7.9-10.8)   04/23/21  08:30    


 


Neut # (Auto)  6.1 10^3/uL (1.5-6.6)   04/23/21  08:30    


 


Lymph # (Auto)  1.5 10^3/uL (1.5-3.5)   04/23/21  08:30    


 


Mono # (Auto)  1.1 10^3/uL (0.0-1.0)  H  04/23/21  08:30    


 


Eos # (Auto)  0.3 10^3/uL (0.0-0.7)   04/23/21  08:30    


 


Baso # (Auto)  0.1 10^3/uL (0.0-0.1)   04/23/21  08:30    


 


Absolute Nucleated RBC  0.00 x10^3/uL  04/23/21  08:30    


 


Nucleated RBC %  0.0 /100WBC  04/23/21  08:30    


 


PT  13.2 secs (9.9-12.6)  H  04/21/21  13:07    


 


INR  1.2  (0.8-1.2)   04/21/21  13:07    


 


VBG pH  7.385  (7.31-7.41)   04/21/21  13:59    


 


VBG pCO2  49.2 mmHg (41-51)   04/21/21  13:59    


 


VBG pO2  24.2 mmHg (25-47)  L  04/21/21  13:59    


 


VBG HCO3  28.8 mmol/L (23-28)  H  04/21/21  13:59    


 


VBG Total CO2  30.3 mmol/L (24-29)  H  04/21/21  13:59    


 


VBG O2 Saturation  46.8 % (60-80)  L  04/21/21  13:59    


 


VBG Base Excess  3.0 mmol/L (-2 - +2)  H  04/21/21  13:59    


 


Sodium  137 mmol/L (135-145)   04/23/21  08:30    


 


Potassium  3.4 mmol/L (3.5-5.0)  L  04/23/21  08:30    


 


Chloride  94 mmol/L (101-111)  L  04/23/21  08:30    


 


Carbon Dioxide  29 mmol/L (21-32)   04/23/21  08:30    


 


Anion Gap  14.0  (6-13)  H  04/23/21  08:30    


 


BUN  12 mg/dL (6-20)   04/23/21  08:30    


 


Creatinine  0.8 mg/dL (0.4-1.0)   04/23/21  08:30    


 


Estimated GFR (MDRD)  90  (>89)   04/23/21  08:30    


 


Glucose  418 mg/dL ()  H  04/23/21  08:30    


 


POC Whole Bld Glucose  201 mg/dL (70 - 100)  H  04/23/21  16:51    


 


Estimat Average Glucose  301 mg/dL ()  H  04/22/21  04:05    


 


Hemoglobin A1c %  12.1 % (4.27-6.07)  H  04/22/21  04:05    


 


Calcium  9.2 mg/dL (8.5-10.3)   04/23/21  08:30    


 


Phosphorus  3.7 mg/dL (2.5-4.6)   04/23/21  08:30    


 


Magnesium  1.9 mg/dL (1.7-2.8)   04/23/21  08:30    


 


Iron  8 ug/dL ()  L  04/22/21  04:05    


 


TIBC  300 ug/dL (250-450)   04/22/21  04:05    


 


% Saturation  3 % (20-50)  L  04/22/21  04:05    


 


Transferrin  214 mg/dL (192-382)   04/22/21  04:05    


 


Ferritin  154.7 ng/mL (11.0-306.8)   04/22/21  04:05    


 


Total Bilirubin  0.5 mg/dL (0.2-1.0)   04/21/21  13:07    


 


AST  30 IU/L (10-42)   04/21/21  13:07    


 


ALT  22 IU/L (10-60)   04/21/21  13:07    


 


Alkaline Phosphatase  88 IU/L ()   04/21/21  13:07    


 


Troponin I High Sens  8.0 ng/L (2.3-14.8)   04/21/21  17:00    


 


Total Protein  8.2 g/dL (6.7-8.2)   04/21/21  13:07    


 


Albumin  3.2 g/dL (3.2-5.5)   04/21/21  13:07    


 


Globulin  5.0 g/dL (2.1-4.2)  H  04/21/21  13:07    


 


Albumin/Globulin Ratio  0.6  (1.0-2.2)  L  04/21/21  13:07    


 


Lipase  26 U/L (22-51)   04/21/21  13:07    


 


Vitamin B12  408 pg/mL (180-914)   04/22/21  04:05    


 


Folate  17.72 ng/mL (5.90 - >24.8)   04/22/21  04:05    


 


Urine Color  YELLOW   04/21/21  14:38    


 


Urine Clarity  CLEAR  (CLEAR)   04/21/21  14:38    


 


Urine pH  6.0 PH (5.0-7.5)   04/21/21  14:38    


 


Ur Specific Gravity  <=1.005  (1.002-1.030)   04/21/21  14:38    


 


Urine Protein  NEGATIVE mg/dL (NEGATIVE)   04/21/21  14:38    


 


Urine Glucose (UA)  >=1000 mg/dL (NEGATIVE)  H  04/21/21  14:38    


 


Urine Ketones  NEGATIVE mg/dL (NEGATIVE)   04/21/21  14:38    


 


Urine Occult Blood  NEGATIVE  (NEGATIVE)   04/21/21  14:38    


 


Urine Nitrite  NEGATIVE  (NEGATIVE)   04/21/21  14:38    


 


Urine Bilirubin  NEGATIVE  (NEGATIVE)   04/21/21  14:38    


 


Urine Urobilinogen  0.2 (NORMAL) E.U./dL (NORMAL)   04/21/21  14:38    


 


Ur Leukocyte Esterase  NEGATIVE  (NEGATIVE)   04/21/21  14:38    


 


Ur Microscopic Review  NOT INDICATED   04/21/21  14:38    


 


Urine Culture Comments  NOT INDICATED   04/21/21  14:38    


 


Nasal Adenovirus (PCR)  NOT DETECTED   04/21/21  14:44    


 


Nasal B. parapertussis DNA (PCR)  NOT DETECTED   04/21/21  14:44    


 


Nasal Coronavir 229E PCR  NOT DETECTED   04/21/21  14:44    


 


Nasal Coronavir HKU1 PCR  NOT DETECTED   04/21/21  14:44    


 


Nasal Coronavir NL63 PCR  NOT DETECTED   04/21/21  14:44    


 


Nasal Coronavir OC43 PCR  NOT DETECTED   04/21/21  14:44    


 


Nasal Enterovir/Rhinovir PCR  NOT DETECTED   04/21/21  14:44    


 


Nasal Influenza B PCR  NOT DETECTED   04/21/21  14:44    


 


Nasal Influenza A PCR  NOT DETECTED   04/21/21  14:44    


 


Nasal Parainfluen 1 PCR  NOT DETECTED   04/21/21  14:44    


 


Nasal Parainfluen 2 PCR  NOT DETECTED   04/21/21  14:44    


 


Nasal Parainfluen 3 PCR  NOT DETECTED   04/21/21  14:44    


 


Nasal Parainfluen 4 PCR  NOT DETECTED   04/21/21  14:44    


 


Nasal RSV (PCR)  NOT DETECTED   04/21/21  14:44    


 


Nasal Screen MRSA (PCR)  POSITIVE  (NEGATIVE)  A*  04/21/21  15:30    


 


Nasal B.pertussis DNA PCR  NOT DETECTED   04/21/21  14:44    


 


Nasal C.pneumoniae (PCR)  NOT DETECTED   04/21/21  14:44    


 


Lenka Human Metapneumo PCR  NOT DETECTED   04/21/21  14:44    


 


Nasal M.pneumoniae (PCR)  NOT DETECTED   04/21/21  14:44    


 


Nasal SARS-CoV-2 (PCR)  NOT DETECTED   04/21/21  14:44    


 


Serum Ketones  NEGATIVE  (NEGATIVE)   04/21/21  13:07

## 2021-04-24 LAB
ANION GAP SERPL CALCULATED.4IONS-SCNC: 11 MMOL/L (ref 6–13)
BASOPHILS NFR BLD AUTO: 0.1 10^3/UL (ref 0–0.1)
BASOPHILS NFR BLD AUTO: 0.5 %
BUN SERPL-MCNC: 13 MG/DL (ref 6–20)
CALCIUM UR-MCNC: 9.2 MG/DL (ref 8.5–10.3)
CHLORIDE SERPL-SCNC: 99 MMOL/L (ref 101–111)
CO2 SERPL-SCNC: 30 MMOL/L (ref 21–32)
CREAT SERPLBLD-SCNC: 0.7 MG/DL (ref 0.4–1)
EOSINOPHIL # BLD AUTO: 0.5 10^3/UL (ref 0–0.7)
EOSINOPHIL NFR BLD AUTO: 4.9 %
ERYTHROCYTE [DISTWIDTH] IN BLOOD BY AUTOMATED COUNT: 12.8 % (ref 12–15)
GFRSERPLBLD MDRD-ARVRAT: 105 ML/MIN/{1.73_M2} (ref 89–?)
GLUCOSE SERPL-MCNC: 180 MG/DL (ref 70–100)
HCT VFR BLD AUTO: 31.5 % (ref 37–47)
HGB UR QL STRIP: 10.4 G/DL (ref 12–16)
LYMPHOCYTES # SPEC AUTO: 1.9 10^3/UL (ref 1.5–3.5)
LYMPHOCYTES NFR BLD AUTO: 19 %
MAGNESIUM SERPL-MCNC: 1.8 MG/DL (ref 1.7–2.8)
MCH RBC QN AUTO: 29.4 PG (ref 27–31)
MCHC RBC AUTO-ENTMCNC: 33 G/DL (ref 32–36)
MCV RBC AUTO: 89 FL (ref 81–99)
MONOCYTES # BLD AUTO: 1.1 10^3/UL (ref 0–1)
MONOCYTES NFR BLD AUTO: 10.9 %
NEUTROPHILS # BLD AUTO: 6.3 10^3/UL (ref 1.5–6.6)
NEUTROPHILS # SNV AUTO: 9.8 X10^3/UL (ref 4.8–10.8)
NEUTROPHILS NFR BLD AUTO: 64.1 %
NRBC # BLD AUTO: 0.02 X10^3/UL
NRBC # BLD AUTO: 0.2 /100WBC
PDW BLD AUTO: 10.6 FL (ref 7.9–10.8)
PLATELET # BLD: 267 10^3/UL (ref 130–450)
POTASSIUM SERPL-SCNC: 3.6 MMOL/L (ref 3.5–5)
RBC MAR: 3.54 10^6/UL (ref 4.2–5.4)
SODIUM SERPLBLD-SCNC: 140 MMOL/L (ref 135–145)

## 2021-04-24 RX ADMIN — SODIUM CHLORIDE, PRESERVATIVE FREE SCH ML: 5 INJECTION INTRAVENOUS at 04:29

## 2021-04-24 RX ADMIN — FAMOTIDINE SCH MG: 20 TABLET, FILM COATED ORAL at 08:16

## 2021-04-24 RX ADMIN — FAMOTIDINE SCH MG: 20 TABLET, FILM COATED ORAL at 20:55

## 2021-04-24 RX ADMIN — INSULIN ASPART SCH UNIT: 100 INJECTION, SOLUTION INTRAVENOUS; SUBCUTANEOUS at 12:12

## 2021-04-24 RX ADMIN — GABAPENTIN SCH MG: 300 CAPSULE ORAL at 12:12

## 2021-04-24 RX ADMIN — SODIUM CHLORIDE, PRESERVATIVE FREE SCH ML: 5 INJECTION INTRAVENOUS at 16:52

## 2021-04-24 RX ADMIN — GABAPENTIN SCH MG: 300 CAPSULE ORAL at 20:54

## 2021-04-24 RX ADMIN — INSULIN ASPART SCH UNIT: 100 INJECTION, SOLUTION INTRAVENOUS; SUBCUTANEOUS at 16:51

## 2021-04-24 RX ADMIN — SODIUM CHLORIDE, PRESERVATIVE FREE SCH ML: 5 INJECTION INTRAVENOUS at 08:18

## 2021-04-24 RX ADMIN — INSULIN ASPART SCH UNIT: 100 INJECTION, SOLUTION INTRAVENOUS; SUBCUTANEOUS at 08:11

## 2021-04-24 RX ADMIN — AZITHROMYCIN MONOHYDRATE SCH MG: 250 TABLET ORAL at 08:16

## 2021-04-24 RX ADMIN — GABAPENTIN SCH MG: 300 CAPSULE ORAL at 08:09

## 2021-04-24 RX ADMIN — Medication SCH MG: at 08:15

## 2021-04-24 RX ADMIN — Medication SCH AMP: at 20:55

## 2021-04-24 RX ADMIN — Medication SCH MG: at 08:16

## 2021-04-24 RX ADMIN — Medication SCH AMP: at 10:33

## 2021-04-24 RX ADMIN — GLIMEPIRIDE SCH MG: 2 TABLET ORAL at 08:16

## 2021-04-24 RX ADMIN — INSULIN ASPART SCH UNIT: 100 INJECTION, SOLUTION INTRAVENOUS; SUBCUTANEOUS at 21:19

## 2021-04-24 RX ADMIN — PIOGLITAZONE SCH MG: 15 TABLET ORAL at 08:15

## 2021-04-24 RX ADMIN — SODIUM CHLORIDE SCH MLS/HR: 900 INJECTION INTRAVENOUS at 09:43

## 2021-04-24 NOTE — PROVIDER PROGRESS NOTE
Assessment/Plan





- Problem List


(1) CAP (community acquired pneumonia)


Assessment/Plan: 


She continues to have good oxygen saturations on room air.


She is coughing less and much less short of breath.


Continue with empiric IV antibiotics.


Will order for her to be overall bed for every meal and walk daily.








(2) Uncontrolled diabetes mellitus


Qualifiers: 


   Diabetes mellitus type: type 2   Glycemic state: with hyperglycemia   Qual

ified Code(s): E11.65 - Type 2 diabetes mellitus with hyperglycemia   


Assessment/Plan: 


Patient has told us she does not want to start insulin.


Her glucose is much better this morning after resuming her to home oral meds on 

top of the at bedtime Lantus that we have used for several days here.


We will stop the Lantus.


Continue Actos and Amaryl.


Resume metformin twice daily to assess if this is adequate glucose control.


Anticipate discharge tomorrow








(3) Ageusia


Assessment/Plan: 


She is getting some of her taste back she said and is taking 100% of her tray.


Continue with current plan and management








(4) Collapse


Assessment/Plan: 


She was dizzy with getting up to walk to bathroom and sat down, the blood 

pressure at that point was 109/60.


Today her vital signs did show a 27 mmHg drop in systolic blood pressure.


Patient not ready for discharge yet.


Will give 1 additional liter of saline IV today.


Anticipate discharge tomorrow.








(5) Hypertension


Qualifiers: 


   Hypertension type: unspecified   Qualified Code(s): I10 - Essential (primary)

hypertension   


Assessment/Plan: 


We are still holding her blood pressure meds since she is orthostatic today.








(6) Anemia


Qualifiers: 


   Anemia type: iron deficiency 


Assessment/Plan: 


She was admitted with anemia and found to have iron deficiency.


Today guaiac test was finally done which is negative.


Continue with oral iron replacement








(7) MRSA carrier


Assessment/Plan: 


Adding alcohol nasal swabs for treatment








(8) Abnormal EKG


Assessment/Plan: 


As per history.  There was a remote stress test which was within normal limits








(9) Hyponatremia


Assessment/Plan: 


Resolved








(10) Hypokalemia


Assessment/Plan: 


Resolved








(11) Fever


Assessment/Plan: 


Resolved








- Current Meds


Current Meds: 





                               Current Medications











Generic Name Dose Route Start Last Admin





  Trade Name Freq  PRN Reason Stop Dose Admin


 


Acetaminophen  650 mg  04/21/21 14:45  04/23/21 01:40





  Acetaminophen 325 Mg Tablet  PO   650 mg





  Q4HR PRN   Administration





  Pain or Fever > 38C (100.4F)  


 


Alcohol  1 amp  04/21/21 21:00  04/24/21 10:33





  Ethyl Alcohol 62% Swab Ampule  LENKA   1 amp





  BID DK   Administration


 


Azithromycin  250 mg  04/23/21 09:00  04/24/21 08:16





  Azithromycin 250 Mg Tablet  PO  04/26/21 09:01  250 mg





  DAILY DK   Administration


 


Famotidine  20 mg  04/21/21 21:00  04/24/21 08:16





  Famotidine 20 Mg Tablet  PO   20 mg





  BID DK   Administration


 


Ferrous Sulfate  300 mg  04/23/21 08:00  04/24/21 08:15





  Ferrous Sulfate 300 Mg/5 Ml Udc  PO   300 mg





  DAILYWM DK   Administration


 


Gabapentin  1,200 mg  04/21/21 21:00  04/24/21 08:09





  Gabapentin 300 Mg Capsule  PO   1,200 mg





  BID DK   Administration


 


Gabapentin  600 mg  04/22/21 12:00  04/23/21 11:40





  Gabapentin 300 Mg Capsule  PO   600 mg





  1200 DK   Administration


 


Glimepiride  2 mg  04/23/21 11:00  04/24/21 08:16





  Glimepiride 2 Mg Tablet  PO   2 mg





  DAILYWM DK   Administration


 


Ceftriaxone Sodium 2 gm/  100 mls @ 200 mls/hr  04/22/21 14:13  04/24/21 10:15





  Sodium Chloride  IV  04/26/21 09:29  Infused





  DAILY DK   Infusion


 


Insulin Aspart  2 - 10 unit  04/23/21 17:00  04/24/21 08:11





  Insulin Aspart 300 Unit/3 Ml Pen  SUBQ   2 unit





  0800,1200,1700,2100 DK   Administration





  Protocol  


 


Ondansetron HCl  4 mg  04/21/21 14:45  04/23/21 17:02





  Ondansetron 4 Mg/2 Ml Vial  IVP   4 mg





  Q6HR PRN   Administration





  Nausea / Vomiting  


 


Pioglitazone HCl  30 mg  04/23/21 11:00  04/24/21 08:15





  Pioglitazone 15 Mg Tablet  PO   30 mg





  DAILY DK   Administration


 


Sodium Chloride  10 ml  04/21/21 17:00  04/24/21 08:18





  Sodium Chloride Flush 0.9% 10 Ml Syringe  IVP   10 ml





  0100,0900,1700 DK   Administration


 


Zinc Sulfate  220 mg  04/22/21 11:00  04/24/21 08:16





  Zinc Sulfate 220 Mg Capsule  PO  04/27/21 11:00  220 mg





  DAILY DK   Administration














- Lab Result


[place] Diagrams: 


                                 04/24/21 07:26





                                 04/24/21 07:26





- Additional Planning


My Orders: 





My Active Orders





04/23/21 11:00


Glimepiride [Amaryl]   2 mg PO DAILYWM 


Pioglitazone [Actos]   30 mg PO DAILY 





04/23/21 17:00


Insulin Aspart [NovoLOG]   2 - 10 unit SUBQ 0800,1200,1700,2100 





04/24/21 12:00


metFORMIN [Glucophage]   500 mg PO BIDWM 














Objective


Vital Signs: 





                               Vital Signs - 24 hr











  04/23/21 04/23/21 04/23/21





  11:59 15:00 18:00


 


Temperature 36.6 C 37.7 C 37.4 C


 


Heart Rate [  85 88





Monitoring   





electrodes]   


 


Respiratory  18 18





Rate   


 


Blood Pressure  131/61 H 156/76 H





[Right Brachial   





artery]   


 


O2 Saturation  96 94














  04/23/21 04/24/21 04/24/21





  21:00 00:00 05:40


 


Temperature 36.8 C 36.9 C 37.5 C


 


Heart Rate [  76 87





Monitoring   





electrodes]   


 


Respiratory  16 15





Rate   


 


Blood Pressure  142/80 H 136/73 H





[Right Brachial   





artery]   


 


O2 Saturation  94 93














  04/24/21 04/24/21





  08:00 11:39


 


Temperature 36.4 C L 36.8 C


 


Heart Rate [ 81 79





Monitoring  





electrodes]  


 


Respiratory 16 18





Rate  


 


Blood Pressure 139/71 H 134/64 H





[Right Brachial  





artery]  


 


O2 Saturation 94 94








                                     Oxygen











O2 Source                      Room air














I&O (Last 24 Hrs): 





                          Intake and Output Totals x24h











 04/22/21 04/23/21 04/24/21





 23:59 23:59 23:59


 


Intake Total 1680 1630 340


 


Output Total 0  


 


Balance 1680 1630 340














- Results


Results: 





                               Laboratory Results











WBC  9.8 x10^3/uL (4.8-10.8)   04/24/21  07:26    


 


RBC  3.54 10^6/uL (4.20-5.40)  L  04/24/21  07:26    


 


Hgb  10.4 g/dL (12.0-16.0)  L  04/24/21  07:26    


 


Hct  31.5 % (37.0-47.0)  L  04/24/21  07:26    


 


MCV  89.0 fL (81.0-99.0)   04/24/21  07:26    


 


MCH  29.4 pg (27.0-31.0)   04/24/21  07:26    


 


MCHC  33.0 g/dL (32.0-36.0)   04/24/21  07:26    


 


RDW  12.8 % (12.0-15.0)   04/24/21  07:26    


 


Plt Count  267 10^3/uL (130-450)   04/24/21  07:26    


 


MPV  10.6 fL (7.9-10.8)   04/24/21  07:26    


 


Neut # (Auto)  6.3 10^3/uL (1.5-6.6)   04/24/21  07:26    


 


Lymph # (Auto)  1.9 10^3/uL (1.5-3.5)   04/24/21  07:26    


 


Mono # (Auto)  1.1 10^3/uL (0.0-1.0)  H  04/24/21  07:26    


 


Eos # (Auto)  0.5 10^3/uL (0.0-0.7)   04/24/21  07:26    


 


Baso # (Auto)  0.1 10^3/uL (0.0-0.1)   04/24/21  07:26    


 


Absolute Nucleated RBC  0.02 x10^3/uL  04/24/21  07:26    


 


Nucleated RBC %  0.2 /100WBC  04/24/21  07:26    


 


PT  13.2 secs (9.9-12.6)  H  04/21/21  13:07    


 


INR  1.2  (0.8-1.2)   04/21/21  13:07    


 


VBG pH  7.385  (7.31-7.41)   04/21/21  13:59    


 


VBG pCO2  49.2 mmHg (41-51)   04/21/21  13:59    


 


VBG pO2  24.2 mmHg (25-47)  L  04/21/21  13:59    


 


VBG HCO3  28.8 mmol/L (23-28)  H  04/21/21  13:59    


 


VBG Total CO2  30.3 mmol/L (24-29)  H  04/21/21  13:59    


 


VBG O2 Saturation  46.8 % (60-80)  L  04/21/21  13:59    


 


VBG Base Excess  3.0 mmol/L (-2 - +2)  H  04/21/21  13:59    


 


Sodium  140 mmol/L (135-145)   04/24/21  07:26    


 


Potassium  3.6 mmol/L (3.5-5.0)   04/24/21  07:26    


 


Chloride  99 mmol/L (101-111)  L  04/24/21  07:26    


 


Carbon Dioxide  30 mmol/L (21-32)   04/24/21  07:26    


 


Anion Gap  11.0  (6-13)   04/24/21  07:26    


 


BUN  13 mg/dL (6-20)   04/24/21  07:26    


 


Creatinine  0.7 mg/dL (0.4-1.0)   04/24/21  07:26    


 


Estimated GFR (MDRD)  105  (>89)   04/24/21  07:26    


 


Glucose  180 mg/dL ()  H  04/24/21  07:26    


 


POC Whole Bld Glucose  397 mg/dL (70 - 100)  H  04/24/21  11:20    


 


Estimat Average Glucose  301 mg/dL ()  H  04/22/21  04:05    


 


Hemoglobin A1c %  12.1 % (4.27-6.07)  H  04/22/21  04:05    


 


Calcium  9.2 mg/dL (8.5-10.3)   04/24/21  07:26    


 


Phosphorus  3.7 mg/dL (2.5-4.6)   04/23/21  08:30    


 


Magnesium  1.8 mg/dL (1.7-2.8)   04/24/21  07:26    


 


Iron  8 ug/dL ()  L  04/22/21  04:05    


 


TIBC  300 ug/dL (250-450)   04/22/21  04:05    


 


% Saturation  3 % (20-50)  L  04/22/21  04:05    


 


Transferrin  214 mg/dL (192-382)   04/22/21  04:05    


 


Ferritin  154.7 ng/mL (11.0-306.8)   04/22/21  04:05    


 


Total Bilirubin  0.5 mg/dL (0.2-1.0)   04/21/21  13:07    


 


AST  30 IU/L (10-42)   04/21/21  13:07    


 


ALT  22 IU/L (10-60)   04/21/21  13:07    


 


Alkaline Phosphatase  88 IU/L ()   04/21/21  13:07    


 


Troponin I High Sens  8.0 ng/L (2.3-14.8)   04/21/21  17:00    


 


Total Protein  8.2 g/dL (6.7-8.2)   04/21/21  13:07    


 


Albumin  3.2 g/dL (3.2-5.5)   04/21/21  13:07    


 


Globulin  5.0 g/dL (2.1-4.2)  H  04/21/21  13:07    


 


Albumin/Globulin Ratio  0.6  (1.0-2.2)  L  04/21/21  13:07    


 


Lipase  26 U/L (22-51)   04/21/21  13:07    


 


Vitamin B12  408 pg/mL (180-914)   04/22/21  04:05    


 


Folate  17.72 ng/mL (5.90 - >24.8)   04/22/21  04:05    


 


Urine Color  YELLOW   04/21/21  14:38    


 


Urine Clarity  CLEAR  (CLEAR)   04/21/21  14:38    


 


Urine pH  6.0 PH (5.0-7.5)   04/21/21  14:38    


 


Ur Specific Gravity  <=1.005  (1.002-1.030)   04/21/21  14:38    


 


Urine Protein  NEGATIVE mg/dL (NEGATIVE)   04/21/21  14:38    


 


Urine Glucose (UA)  >=1000 mg/dL (NEGATIVE)  H  04/21/21  14:38    


 


Urine Ketones  NEGATIVE mg/dL (NEGATIVE)   04/21/21  14:38    


 


Urine Occult Blood  NEGATIVE  (NEGATIVE)   04/21/21  14:38    


 


Urine Nitrite  NEGATIVE  (NEGATIVE)   04/21/21  14:38    


 


Urine Bilirubin  NEGATIVE  (NEGATIVE)   04/21/21  14:38    


 


Urine Urobilinogen  0.2 (NORMAL) E.U./dL (NORMAL)   04/21/21  14:38    


 


Ur Leukocyte Esterase  NEGATIVE  (NEGATIVE)   04/21/21  14:38    


 


Ur Microscopic Review  NOT INDICATED   04/21/21  14:38    


 


Urine Culture Comments  NOT INDICATED   04/21/21  14:38    


 


Nasal Adenovirus (PCR)  NOT DETECTED   04/21/21  14:44    


 


Nasal B. parapertussis DNA (PCR)  NOT DETECTED   04/21/21  14:44    


 


Nasal Coronavir 229E PCR  NOT DETECTED   04/21/21  14:44    


 


Nasal Coronavir HKU1 PCR  NOT DETECTED   04/21/21  14:44    


 


Nasal Coronavir NL63 PCR  NOT DETECTED   04/21/21  14:44    


 


Nasal Coronavir OC43 PCR  NOT DETECTED   04/21/21  14:44    


 


Nasal Enterovir/Rhinovir PCR  NOT DETECTED   04/21/21  14:44    


 


Nasal Influenza B PCR  NOT DETECTED   04/21/21  14:44    


 


Nasal Influenza A PCR  NOT DETECTED   04/21/21  14:44    


 


Nasal Parainfluen 1 PCR  NOT DETECTED   04/21/21  14:44    


 


Nasal Parainfluen 2 PCR  NOT DETECTED   04/21/21  14:44    


 


Nasal Parainfluen 3 PCR  NOT DETECTED   04/21/21  14:44    


 


Nasal Parainfluen 4 PCR  NOT DETECTED   04/21/21  14:44    


 


Nasal RSV (PCR)  NOT DETECTED   04/21/21  14:44    


 


Nasal Screen MRSA (PCR)  POSITIVE  (NEGATIVE)  A*  04/21/21  15:30    


 


Nasal B.pertussis DNA PCR  NOT DETECTED   04/21/21  14:44    


 


Nasal C.pneumoniae (PCR)  NOT DETECTED   04/21/21  14:44    


 


Lenka Human Metapneumo PCR  NOT DETECTED   04/21/21  14:44    


 


Nasal M.pneumoniae (PCR)  NOT DETECTED   04/21/21  14:44    


 


Nasal SARS-CoV-2 (PCR)  NOT DETECTED   04/21/21  14:44    


 


Serum Ketones  NEGATIVE  (NEGATIVE)   04/21/21  13:07

## 2021-04-25 VITALS — SYSTOLIC BLOOD PRESSURE: 138 MMHG | DIASTOLIC BLOOD PRESSURE: 83 MMHG

## 2021-04-25 RX ADMIN — SODIUM CHLORIDE, PRESERVATIVE FREE SCH ML: 5 INJECTION INTRAVENOUS at 09:35

## 2021-04-25 RX ADMIN — PIOGLITAZONE SCH MG: 15 TABLET ORAL at 09:25

## 2021-04-25 RX ADMIN — Medication SCH MG: at 09:26

## 2021-04-25 RX ADMIN — FAMOTIDINE SCH MG: 20 TABLET, FILM COATED ORAL at 09:26

## 2021-04-25 RX ADMIN — AZITHROMYCIN MONOHYDRATE SCH MG: 250 TABLET ORAL at 09:26

## 2021-04-25 RX ADMIN — INSULIN ASPART SCH UNIT: 100 INJECTION, SOLUTION INTRAVENOUS; SUBCUTANEOUS at 08:07

## 2021-04-25 RX ADMIN — GLIMEPIRIDE SCH MG: 2 TABLET ORAL at 08:06

## 2021-04-25 RX ADMIN — Medication SCH AMP: at 09:26

## 2021-04-25 RX ADMIN — SODIUM CHLORIDE SCH MLS/HR: 900 INJECTION INTRAVENOUS at 09:27

## 2021-04-25 RX ADMIN — GABAPENTIN SCH MG: 300 CAPSULE ORAL at 08:03

## 2021-04-25 RX ADMIN — SODIUM CHLORIDE, PRESERVATIVE FREE SCH: 5 INJECTION INTRAVENOUS at 01:35

## 2021-04-25 RX ADMIN — Medication SCH MG: at 08:06

## 2021-04-25 RX ADMIN — GABAPENTIN SCH MG: 300 CAPSULE ORAL at 11:41

## 2021-04-25 RX ADMIN — INSULIN ASPART SCH UNIT: 100 INJECTION, SOLUTION INTRAVENOUS; SUBCUTANEOUS at 11:41

## 2021-04-25 NOTE — DISCHARGE PLAN
Discharge Plan


Problem Reviewed?: Yes


Disposition: 01 Home, Self Care


Condition: Stable


Prescriptions: 


Amox/Clav 875/125 [Augmentin 875/125 Tab] 1 tablet PO Q12H #8 tablet


Ferric Citrate [Auryxia] 210 mg PO DAILY #30 tablet


Metformin HCl [Metformin ER Osmotic] 500 mg PO DAILY #30 tab.sr


Diet: Diabetic


Activity Restrictions: Activity as Tolerated (No return to work until Fri 4/30/21 at the earliest)


Shower Restrictions: No


Driving Restrictions: No


Instruction Topics:  Pneumonia, Diabetes Long Term Complications, Blood Sugar 

Check, Diabetes Healthy Meals


Health Concerns: 


You were admitted for management of very high glucose over 700.  We found that 

you had significant dehydration and fevers began and we found you had a 

pneumonia.  You have completed several days of IV antibiotics and are being 

discharged to take several more days of oral antibiotics.





We used Insulin while you were here for better glucose control.  Because you do 

not want to start Insulin therapy as an outpatient, please continue your 

previous oral diabetic medications and restart taking Metformin (the long-acting

form has been prescribed for you).





We found that you have iron deficiency and are being prescribed iron replacement

therapy.





Your blood pressure was still low yesterday, and you needed IV hydration.  

Please do NOT resume taking your blood pressure medications for about another 5 

days.





All new prescriptions were electronically sent to your [facility] pharmacy in 

[place].





A release from work has been provided for you.  Please see your PCP in the 

upcoming week for hospital follow-up.





Plan of Treatment: 


As above.





Care Goals: 


Improvement in symptoms and stabilization are the goals.





Assessment: 


Patient understands and is agreeable with the plan.





Additional Instructions or Follow Up instructions: 


If you have new or worsening symptoms, call your PCP for advice or come to the 

ER.





No Smoking: If you smoke, Please STOP!  Call [phone] for help.


Follow-up with: 


Yuri Sanchez MD [Primary Care Provider] -

## 2021-04-25 NOTE — DISCHARGE SUMMARY
Discharge Summary


Admit Date: 04/21/21


Discharge Date: 04/25/21


Discharging Provider: Dr Jocelin Hercules


Primary Care Provider: Dr Yuri Sanchez


Code Status: Do Not Attempt Resuscitation


Condition at Discharge: Stable


Discharge Disposition: 01 Home, Self Care





- HPI


History of Present Illness: 





This is a 56-year-old black female with a history of Diabetes, on metformin, 

actos and glimiperide, HTN and hyperlipidemia, who has been working 12-hour 

shifts for 8 days straight, has been feeling very fatigued, has received her 2 

Covid vaccines and then noticed a loss of her taste.  She states her diet of 

solids has not been its usual because of the loss of taste and she admits to 

drinking more sodas with sugar and lemonade with sugar than usual, to see if her

taste was really affected.  At home she collapsed onto the floor today while 

walking, where she laid for a while, and her  had to pick her up and he 

urged her to come to the ER.  There was no syncope.  She remembers the entire 

event.  There had been dizziness and weakness especially worse today.  She 

denies a fever, cough, shortness of breath, or chest pain.  She has noticed 

polyuria and was even incontinent of urine twice in the past 3 days, but has had

no dysuria. She is compliant with her medications including actos and 

glimiperide, but she took herself off metformin when Dr Sanchez added actos 

several mos ago, she said, when her last A1c was 14 in 10/2020.  She was 

checking her blood sugars, which were running 180-200, but no checks for 1 week.

 In the ER she was found to have a serum glucose of 710, normal serum pH of 7.39

and low sodium of 127.  She is presented for management of very high glucose and

dehydration and is being placed in Observation status.


I did discuss her code wishes with her and she wishes to be a DNR, DNI (there is

no POLST however).








- HOSPITAL COURSE


Hospital Course: 


(1) Uncontrolled diabetes mellitus


She received iv Insulin drip for 1 day.  [facility].  Her A1c result was 12.1, indicating poor longterm 

control, not just recent.  Patient told us she does not want to start Insulin 

use, therefore she was restarted on her Actos and Amaryl and agreed to restart 

Metformin long acting (which has less diarrhea side effects, which was why she 

had stopped Metformin on her own several months ago).  She was discharged with a

new prescription for Metformin  mg once a day.





(2) CAP (community acquired pneumonia)


On her second day, she spiked a fever, was lethargic and had a cough.  CXR 

showed R upper lobe and perihilar infiltrates, which had blossomed after iv 

hydration.  She had no desaturations on room air.  She was started on empiric IV

antibiotics Zithromax and Rocephin for a community acquired pneumonia.  There 

were further fevers, blood cultures were done and remained neg.  She was 

discharged to take several more days of Augmentin (since she reported sinus 

congestion).





(3) Ageusia


She started getting some of her taste back she said and was taking in 100% of 

her tray, and felt stronger.





(4) Collapse


Several days into her course, she was dizzy with getting up to walk to bathroom 

needed to sit down, and her blood pressure was 109/60.  Her vital signs did show

a 27 mmHg drop in systolic blood pressure and she was not ready for discharge 

yet.  She received 1 additional liter of saline IV.  Her BP meds were only 

resumed on the day of discharge.





(5) Hypertension


We were holding her blood pressure meds due to dehydration and orthostasis, 

which could be resumed only on the day of discharge.





(6) Anemia, iron deficiency


She was admitted with anemia, Hgb was 10.3 despite being dehydrated.  We found 

her to have iron deficiency.  A guaiac test was done which is negative.  She was

started on and discharged on oral iron replacement.





(7) MRSA carrier


She got topical alcohol nasal swabs for treatment.





(8) Abnormal EKG


As per history.  There was a remote stress test which was within normal limits.





(9) Hyponatremia


Resolved when glu was controlled and with replacement using iv NS.





(10) Hypokalemia


Resolved with replacement.





(11) Fever


Resolved with treatment of pneumonia.








- ALLERGIES


Allergies/Adverse Reactions: 


                                    Allergies











Allergy/AdvReac Type Severity Reaction Status Date / Time


 


No Known Drug Allergies Allergy   Verified 04/21/21 12:47














- MEDICATIONS


Home Medications: 


                                Ambulatory Orders











 Medication  Instructions  Recorded  Confirmed


 


Glimepiride [Amaryl] 2 mg PO DAILYWM #30 tablet 03/26/18 04/21/21


 


NIFEdipine [Nifedipine ER] 30 mg PO DAILY #30 tablet.er 03/26/18 04/21/21


 


Gabapentin 1,200 mg PO BID 06/27/18 04/21/21


 


Simvastatin 40 mg PO QPM 06/27/18 04/21/21


 


Gabapentin [Neurontin] 600 mg PO 1200 04/21/21 04/21/21


 


Pioglitazone HCl [Actos] 30 mg PO DAILY 04/21/21 04/21/21


 


Amox/Clav 875/125 [Augmentin 1 tablet PO Q12H #8 tablet 04/25/21 





875/125 Tab]   


 


Ferric Citrate [Auryxia] 210 mg PO DAILY #30 tablet 04/25/21 


 


Losartan [Cozaar] 50 mg PO DAILY #30 tablet 04/25/21 04/21/21


 


Metformin HCl [Metformin  mg PO DAILY #30 tab.sr 04/25/21 





Osmotic]   














- PHYSICAL EXAM AT DISCHARGE


General Appearance: positive: No acute distress, Alert


Eyes Bilateral: positive: Normal inspection, EOMI


ENT: positive: ENT inspection nml, No signs of dehydration


Neck: positive: Nml inspection, No JVD


Respiratory: positive: Chest non-tender, No respiratory distress, Breath sounds 

nml


Cardiovascular: positive: Regular rate & rhythm, No murmur


Abdomen: positive: Non-tender, Nml bowel sounds, No distention


Skin: positive: Warm, Dry


Extremities: positive: Non-tender, No pedal edema


Neurologic/Psychiatric: positive: Oriented x3 (Non-focal )





- LABS


Result Diagrams: 


                                 04/24/21 07:26





                                 04/24/21 07:26





- DIAGNOSTIC IMAGING


Diagnostic Imaging Results: Final report reviewed





- FOLLOW UP


Follow Up: 





See PCP in about 1 week for hospital follow-up.








- TIME SPENT


Time Spent in Discharge (Minutes): 60

## 2021-05-03 ENCOUNTER — HOSPITAL ENCOUNTER (OUTPATIENT)
Dept: HOSPITAL 76 - LAB.N | Age: 56
Discharge: HOME | End: 2021-05-03
Attending: INTERNAL MEDICINE
Payer: COMMERCIAL

## 2021-05-03 DIAGNOSIS — I10: Primary | ICD-10-CM

## 2021-05-03 DIAGNOSIS — D64.9: ICD-10-CM

## 2021-05-03 LAB
ALBUMIN DIAFP-MCNC: 3.5 G/DL (ref 3.2–5.5)
ALBUMIN/GLOB SERPL: 0.7 {RATIO} (ref 1–2.2)
ALP SERPL-CCNC: 89 IU/L (ref 42–121)
ALT SERPL W P-5'-P-CCNC: 19 IU/L (ref 10–60)
ANION GAP SERPL CALCULATED.4IONS-SCNC: 11 MMOL/L (ref 6–13)
AST SERPL W P-5'-P-CCNC: 14 IU/L (ref 10–42)
BASOPHILS NFR BLD AUTO: 0.1 10^3/UL (ref 0–0.1)
BASOPHILS NFR BLD AUTO: 0.8 %
BILIRUB BLD-MCNC: 0.4 MG/DL (ref 0.2–1)
BUN SERPL-MCNC: 17 MG/DL (ref 6–20)
CALCIUM UR-MCNC: 9.8 MG/DL (ref 8.5–10.3)
CHLORIDE SERPL-SCNC: 99 MMOL/L (ref 101–111)
CO2 SERPL-SCNC: 31 MMOL/L (ref 21–32)
CREAT SERPLBLD-SCNC: 0.8 MG/DL (ref 0.4–1)
EOSINOPHIL # BLD AUTO: 0.3 10^3/UL (ref 0–0.7)
EOSINOPHIL NFR BLD AUTO: 3.7 %
ERYTHROCYTE [DISTWIDTH] IN BLOOD BY AUTOMATED COUNT: 13.2 % (ref 12–15)
GFRSERPLBLD MDRD-ARVRAT: 90 ML/MIN/{1.73_M2} (ref 89–?)
GLOBULIN SER-MCNC: 4.8 G/DL (ref 2.1–4.2)
GLUCOSE SERPL-MCNC: 182 MG/DL (ref 70–100)
HCT VFR BLD AUTO: 34.1 % (ref 37–47)
HGB UR QL STRIP: 10.7 G/DL (ref 12–16)
LYMPHOCYTES # SPEC AUTO: 2.7 10^3/UL (ref 1.5–3.5)
LYMPHOCYTES NFR BLD AUTO: 28.9 %
MCH RBC QN AUTO: 28.9 PG (ref 27–31)
MCHC RBC AUTO-ENTMCNC: 31.4 G/DL (ref 32–36)
MCV RBC AUTO: 92.2 FL (ref 81–99)
MONOCYTES # BLD AUTO: 0.5 10^3/UL (ref 0–1)
MONOCYTES NFR BLD AUTO: 5.1 %
NEUTROPHILS # BLD AUTO: 5.7 10^3/UL (ref 1.5–6.6)
NEUTROPHILS # SNV AUTO: 9.3 X10^3/UL (ref 4.8–10.8)
NEUTROPHILS NFR BLD AUTO: 61.2 %
NRBC # BLD AUTO: 0 /100WBC
NRBC # BLD AUTO: 0 X10^3/UL
PDW BLD AUTO: 10.8 FL (ref 7.9–10.8)
PLATELET # BLD: 413 10^3/UL (ref 130–450)
POTASSIUM SERPL-SCNC: 3.8 MMOL/L (ref 3.5–5)
PROT SPEC-MCNC: 8.3 G/DL (ref 6.7–8.2)
RBC MAR: 3.7 10^6/UL (ref 4.2–5.4)
SODIUM SERPLBLD-SCNC: 141 MMOL/L (ref 135–145)

## 2021-05-03 PROCEDURE — 84466 ASSAY OF TRANSFERRIN: CPT

## 2021-05-03 PROCEDURE — 83540 ASSAY OF IRON: CPT

## 2021-05-03 PROCEDURE — 85025 COMPLETE CBC W/AUTO DIFF WBC: CPT

## 2021-05-03 PROCEDURE — 36415 COLL VENOUS BLD VENIPUNCTURE: CPT

## 2021-05-03 PROCEDURE — 82728 ASSAY OF FERRITIN: CPT

## 2021-05-03 PROCEDURE — 80053 COMPREHEN METABOLIC PANEL: CPT

## 2021-05-04 LAB
IRON SATN MFR SERPL: 12 % (ref 20–50)
IRON SERPL-MCNC: 39 UG/DL (ref 28–170)
TIBC SERPL-MCNC: 322 UG/DL (ref 250–450)
TRANSFERRIN SERPL-MCNC: 230 MG/DL (ref 192–382)

## 2021-05-05 ENCOUNTER — HOSPITAL ENCOUNTER (OUTPATIENT)
Dept: HOSPITAL 76 - EMS | Age: 56
Discharge: ANOTHER HOSPITAL | End: 2021-05-05
Payer: COMMERCIAL

## 2021-05-05 ENCOUNTER — HOSPITAL ENCOUNTER (OUTPATIENT)
Dept: HOSPITAL 76 - ED | Age: 56
Setting detail: OBSERVATION
LOS: 2 days | Discharge: HOME | End: 2021-05-07
Attending: NURSE PRACTITIONER | Admitting: NURSE PRACTITIONER
Payer: COMMERCIAL

## 2021-05-05 DIAGNOSIS — Z20.822: ICD-10-CM

## 2021-05-05 DIAGNOSIS — I10: ICD-10-CM

## 2021-05-05 DIAGNOSIS — Z79.899: ICD-10-CM

## 2021-05-05 DIAGNOSIS — R41.82: Primary | ICD-10-CM

## 2021-05-05 DIAGNOSIS — Z87.891: ICD-10-CM

## 2021-05-05 DIAGNOSIS — Z79.84: ICD-10-CM

## 2021-05-05 DIAGNOSIS — E78.5: ICD-10-CM

## 2021-05-05 DIAGNOSIS — E11.40: ICD-10-CM

## 2021-05-05 DIAGNOSIS — I95.1: Primary | ICD-10-CM

## 2021-05-05 DIAGNOSIS — R42: ICD-10-CM

## 2021-05-05 DIAGNOSIS — Z66: ICD-10-CM

## 2021-05-05 DIAGNOSIS — E11.65: ICD-10-CM

## 2021-05-05 DIAGNOSIS — R74.02: ICD-10-CM

## 2021-05-05 DIAGNOSIS — R51.9: ICD-10-CM

## 2021-05-05 LAB
ALBUMIN DIAFP-MCNC: 3.7 G/DL
ALBUMIN/GLOB SERPL: 0.7 {RATIO}
ALP SERPL-CCNC: 91 IU/L
ALT SERPL W P-5'-P-CCNC: 19 IU/L
AMPHET UR QL SCN: NEGATIVE
ANION GAP SERPL CALCULATED.4IONS-SCNC: 14 MMOL/L
AST SERPL W P-5'-P-CCNC: 16 IU/L
B PARAPERT DNA SPEC QL NAA+PROBE: NOT DETECTED
B PERT DNA SPEC QL NAA+PROBE: NOT DETECTED
BARBITURATES UR QL SCN>300 NG/ML: NEGATIVE
BASE EXCESS BLDV CALC-SCNC: 2.2 MMOL/L
BASOPHILS NFR BLD AUTO: 0.1 10^3/UL
BASOPHILS NFR BLD AUTO: 0.8 %
BENZODIAZ UR QL SCN: NEGATIVE
BILIRUB BLD-MCNC: 0.6 MG/DL
BUN SERPL-MCNC: 17 MG/DL
C PNEUM DNA NPH QL NAA+NON-PROBE: NOT DETECTED
CALCIUM UR-MCNC: 9.6 MG/DL
CHLORIDE SERPL-SCNC: 97 MMOL/L
CLARITY UR REFRACT.AUTO: CLEAR
CO2 BLDV-SCNC: 30.4 MMOL/L
CO2 SERPL-SCNC: 27 MMOL/L
COCAINE UR-SCNC: NEGATIVE UMOL/L
CREAT SERPLBLD-SCNC: 0.9 MG/DL
EOSINOPHIL # BLD AUTO: 0.2 10^3/UL
EOSINOPHIL NFR BLD AUTO: 2.9 %
ERYTHROCYTE [DISTWIDTH] IN BLOOD BY AUTOMATED COUNT: 13.3 %
EST. AVERAGE GLUCOSE BLD GHB EST-MCNC: 301 MG/DL
FLUAV RNA RESP QL NAA+PROBE: NOT DETECTED
GFRSERPLBLD MDRD-ARVRAT: 78 ML/MIN/{1.73_M2}
GLOBULIN SER-MCNC: 5 G/DL
GLUCOSE SERPL-MCNC: 352 MG/DL
GLUCOSE UR QL STRIP.AUTO: >=1000 MG/DL
HAEM INFLU B DNA SPEC QL NAA+PROBE: NOT DETECTED
HBA1C MFR BLD HPLC: 12.1 %
HCO3 BLDMV-SCNC: 28.8 MMOL/L
HCOV 229E RNA SPEC QL NAA+PROBE: NOT DETECTED
HCOV HKU1 RNA UPPER RESP QL NAA+PROBE: NOT DETECTED
HCOV NL63 RNA ASPIRATE QL NAA+PROBE: NOT DETECTED
HCOV OC43 RNA SPEC QL NAA+PROBE: NOT DETECTED
HCT VFR BLD AUTO: 36.5 %
HGB UR QL STRIP: 11.5 G/DL
HMPV AG SPEC QL: NOT DETECTED
HPIV1 RNA NPH QL NAA+PROBE: NOT DETECTED
HPIV2 SPEC QL CULT: NOT DETECTED
HPIV3 AB TITR SER CF: NOT DETECTED {TITER}
HPIV4 RNA SPEC QL NAA+PROBE: NOT DETECTED
KETONES UR QL STRIP.AUTO: NEGATIVE MG/DL
LIPASE SERPL-CCNC: 112 U/L
LYMPHOCYTES # SPEC AUTO: 2.1 10^3/UL
LYMPHOCYTES NFR BLD AUTO: 26.6 %
M PNEUMO DNA SPEC QL NAA+PROBE: NOT DETECTED
MCH RBC QN AUTO: 28.4 PG
MCHC RBC AUTO-ENTMCNC: 31.5 G/DL
MCV RBC AUTO: 90.1 FL
METHADONE UR QL SCN: NEGATIVE
METHAMPHET UR QL SCN: NEGATIVE
MONOCYTES # BLD AUTO: 0.4 10^3/UL
MONOCYTES NFR BLD AUTO: 5.1 %
NEUTROPHILS # BLD AUTO: 5.1 10^3/UL
NEUTROPHILS # SNV AUTO: 7.9 X10^3/UL
NEUTROPHILS NFR BLD AUTO: 64.2 %
NITRITE UR QL STRIP.AUTO: NEGATIVE
NRBC # BLD AUTO: 0 /100WBC
NRBC # BLD AUTO: 0 X10^3/UL
OPIATES UR QL SCN: NEGATIVE
PCO2 BLDV: 53.5 MMHG
PDW BLD AUTO: 10.7 FL
PH BLDV: 7.35 [PH]
PH UR STRIP.AUTO: 7 PH
PLATELET # BLD: 202 10^3/UL
PO2 BLDV: 30.8 MMHG
POTASSIUM SERPL-SCNC: 3.8 MMOL/L
PROT SPEC-MCNC: 8.7 G/DL
PROT UR STRIP.AUTO-MCNC: NEGATIVE MG/DL
RBC # UR STRIP.AUTO: NEGATIVE /UL
RBC MAR: 4.05 10^6/UL
RSV RNA RESP QL NAA+PROBE: NOT DETECTED
RV+EV RNA SPEC QL NAA+PROBE: NOT DETECTED
SAO2 DF BLDV: 57.9 %
SARS-COV-2 RNA PNL SPEC NAA+PROBE: NOT DETECTED
SODIUM SERPLBLD-SCNC: 138 MMOL/L
SP GR UR STRIP.AUTO: 1.01
THC UR QL SCN: NEGATIVE
UROBILINOGEN UR QL STRIP.AUTO: (no result) E.U./DL
UROBILINOGEN UR STRIP.AUTO-MCNC: NEGATIVE MG/DL
VOLATILE DRUGS POS SERPL SCN: (no result)

## 2021-05-05 PROCEDURE — 96372 THER/PROPH/DIAG INJ SC/IM: CPT

## 2021-05-05 PROCEDURE — 99284 EMERGENCY DEPT VISIT MOD MDM: CPT

## 2021-05-05 PROCEDURE — 84484 ASSAY OF TROPONIN QUANT: CPT

## 2021-05-05 PROCEDURE — 83690 ASSAY OF LIPASE: CPT

## 2021-05-05 PROCEDURE — 80306 DRUG TEST PRSMV INSTRMNT: CPT

## 2021-05-05 PROCEDURE — 97165 OT EVAL LOW COMPLEX 30 MIN: CPT

## 2021-05-05 PROCEDURE — 36415 COLL VENOUS BLD VENIPUNCTURE: CPT

## 2021-05-05 PROCEDURE — 81003 URINALYSIS AUTO W/O SCOPE: CPT

## 2021-05-05 PROCEDURE — 83605 ASSAY OF LACTIC ACID: CPT

## 2021-05-05 PROCEDURE — 71045 X-RAY EXAM CHEST 1 VIEW: CPT

## 2021-05-05 PROCEDURE — 80048 BASIC METABOLIC PNL TOTAL CA: CPT

## 2021-05-05 PROCEDURE — 85025 COMPLETE CBC W/AUTO DIFF WBC: CPT

## 2021-05-05 PROCEDURE — 99285 EMERGENCY DEPT VISIT HI MDM: CPT

## 2021-05-05 PROCEDURE — 87640 STAPH A DNA AMP PROBE: CPT

## 2021-05-05 PROCEDURE — 82803 BLOOD GASES ANY COMBINATION: CPT

## 2021-05-05 PROCEDURE — 81001 URINALYSIS AUTO W/SCOPE: CPT

## 2021-05-05 PROCEDURE — 87040 BLOOD CULTURE FOR BACTERIA: CPT

## 2021-05-05 PROCEDURE — 80053 COMPREHEN METABOLIC PANEL: CPT

## 2021-05-05 PROCEDURE — 83036 HEMOGLOBIN GLYCOSYLATED A1C: CPT

## 2021-05-05 PROCEDURE — 97161 PT EVAL LOW COMPLEX 20 MIN: CPT

## 2021-05-05 PROCEDURE — 82009 KETONE BODYS QUAL: CPT

## 2021-05-05 PROCEDURE — 96374 THER/PROPH/DIAG INJ IV PUSH: CPT

## 2021-05-05 PROCEDURE — 80320 DRUG SCREEN QUANTALCOHOLS: CPT

## 2021-05-05 PROCEDURE — 0202U NFCT DS 22 TRGT SARS-COV-2: CPT

## 2021-05-05 PROCEDURE — 93005 ELECTROCARDIOGRAM TRACING: CPT

## 2021-05-05 PROCEDURE — 70450 CT HEAD/BRAIN W/O DYE: CPT

## 2021-05-05 RX ADMIN — INSULIN GLARGINE SCH UNIT: 100 INJECTION, SOLUTION SUBCUTANEOUS at 20:47

## 2021-05-05 RX ADMIN — INSULIN ASPART SCH: 100 INJECTION, SOLUTION INTRAVENOUS; SUBCUTANEOUS at 17:46

## 2021-05-05 RX ADMIN — GABAPENTIN SCH MG: 300 CAPSULE ORAL at 20:46

## 2021-05-05 RX ADMIN — SODIUM CHLORIDE, PRESERVATIVE FREE SCH: 5 INJECTION INTRAVENOUS at 17:46

## 2021-05-05 RX ADMIN — INSULIN ASPART SCH: 100 INJECTION, SOLUTION INTRAVENOUS; SUBCUTANEOUS at 20:47

## 2021-05-05 RX ADMIN — GABAPENTIN SCH MG: 300 CAPSULE ORAL at 20:47

## 2021-05-05 RX ADMIN — GABAPENTIN SCH MG: 300 CAPSULE ORAL at 19:34

## 2021-05-05 RX ADMIN — SODIUM CHLORIDE SCH MLS/HR: 9 INJECTION, SOLUTION INTRAVENOUS at 15:48

## 2021-05-05 NOTE — CT REPORT
PROCEDURE:  HEAD WO

 

INDICATIONS:  headache, syncope, +HT

 

TECHNIQUE:  

Noncontrast 4.5 mm thick angled axial sections acquired from the foramen magnum to the vertex.  For r
adiation dose reduction, the following was used:  automated exposure control, adjustment of mA and/or
 kV according to patient size.

 

COMPARISON:  None.

 

FINDINGS:  

Image quality:  Excellent.  

 

CSF spaces:  Basal cisterns are patent.  No extra-axial fluid collections.  Ventricles are normal in 
size and shape.  

 

Brain:  No midline shift.  No intracranial masses or hemorrhage.  Gray-white matter interface is norm
al.  

 

Skull and face:  Calvarium and visualized facial bones are intact, without suspicious lesions.  

 

Sinuses:  Visualized sinuses and mastoids are clear.  

 

IMPRESSION:  No source of severe headache is identified.

 

Reviewed by: Mark Anthony Smith MD on 5/5/2021 12:59 PM PDT

Approved by: Mark Anthony Smith MD on 5/5/2021 12:59 PM PDT

 

 

Station ID:  [id number]

## 2021-05-05 NOTE — ED PHYSICIAN DOCUMENTATION
History of Present Illness





- Stated complaint


Stated Complaint: WEAKNESS





- Chief complaint


Chief Complaint: Neuro





- History obtained from


History obtained from: Patient





- Additonal information


Additional information: 





56-year-old woman with history of diabetes, high blood pressure, recent 

admission here to the ICU for uncontrolled blood sugar and MRSA pneumonia, 

presents with lightheadedness since discharge with syncopal episode at [facility] today during which she hit her head on the wall according to bystanders. 

She is now back to baseline but endorses a mild bilateral frontal headache that 

is nonradiating, aching, constant.  No focal neurological deficits.  She does 

have associated mild photophobia.  





Review of Systems


Ten Systems: 10 systems reviewed and negative


Musculoskeletal: denies: Neck pain


Neurologic: reports: Generalized weakness, Syncope, Headache, Head injury, LOC





PD PAST MEDICAL HISTORY





- Past Medical History


Cardiovascular: Hypertension, High cholesterol, Other


Respiratory: None


Endocrine/Autoimmune: Type 2 diabetes


GI: None


GYN: Other


: None


HEENT: Other


Psych: None


Musculoskeletal: None


Derm: None





- Past Surgical History


Past Surgical History: Yes


General: Other





- Present Medications


Home Medications: 


                                Ambulatory Orders











 Medication  Instructions  Recorded  Confirmed


 


Glimepiride [Amaryl] 2 mg PO DAILYWM #30 tablet 03/26/18 05/05/21


 


NIFEdipine [Nifedipine ER] 30 mg PO DAILY #30 tablet.er 03/26/18 05/05/21


 


Gabapentin 1,200 mg PO BID 06/27/18 05/05/21


 


Simvastatin 40 mg PO QPM 06/27/18 05/05/21


 


Gabapentin [Neurontin] 600 mg PO 1200 04/21/21 05/05/21


 


Pioglitazone HCl [Actos] 30 mg PO DAILY 04/21/21 05/05/21


 


Amox/Clav 875/125 [Augmentin 1 tablet PO Q12H #8 tablet 04/25/21 05/05/21





875/125 Tab]   


 


Ferric Citrate [Auryxia] 210 mg PO DAILY #30 tablet 04/25/21 05/05/21


 


Losartan [Cozaar] 50 mg PO DAILY #30 tablet 04/25/21 05/05/21


 


Metformin HCl [Metformin  mg PO DAILY #30 tab.sr 04/25/21 05/05/21





Osmotic]   














- Allergies


Allergies/Adverse Reactions: 


                                    Allergies











Allergy/AdvReac Type Severity Reaction Status Date / Time


 


No Known Drug Allergies Allergy   Verified 05/05/21 11:46














- Social History


Does the pt smoke?: No


Smoking Status: Never smoker


Does the pt drink ETOH?: No


Does the pt have substance abuse?: No





- Immunizations


Immunizations are current?: Yes





- POLST


Patient has POLST: No


POLST Status: Full Code





PD ED PE NORMAL





- Vitals


Vital signs reviewed: Yes





- General


General: Alert and oriented X 3, No acute distress, Well developed/nourished





- HEENT


HEENT: Atraumatic, PERRL, EOMI





- Neck


Neck: Supple, no meningeal sign





- Cardiac


Cardiac: RRR





- Respiratory


Respiratory: No respiratory distress, Clear bilaterally





- Abdomen


Abdomen: Non tender, Non distended





- Derm


Derm: Normal color, Warm and dry





- Extremities


Extremities: No deformity





- Neuro


Neuro: Alert and oriented X 3, CNs 2-12 intact, No motor deficit, No sensory 

deficit, Normal speech





- Psych


Psych: Normal mood, Other (patient spoke in quiet, high pitched voice. kept her 

eyes closed unless asked to open them. )





Results





- Vitals


Vitals: 


                               Vital Signs - 24 hr











  05/05/21 05/05/21 05/05/21





  11:48 12:42 13:32


 


Temperature 36.8 C  


 


Heart Rate 80 75 85


 


Respiratory 18 15 16





Rate   


 


Blood Pressure 178/85 H 156/82 H 121/67


 


O2 Saturation 100 100 100














  05/05/21





  14:07


 


Temperature 


 


Heart Rate 83


 


Respiratory 14





Rate 


 


Blood Pressure 145/77 H


 


O2 Saturation 99








                                     Oxygen











O2 Source                      Room air

















- Labs


Labs: 


                                Laboratory Tests











  05/05/21 05/05/21 05/05/21





  12:03 12:03 12:03


 


WBC   


 


RBC   


 


Hgb   


 


Hct   


 


MCV   


 


MCH   


 


MCHC   


 


RDW   


 


Plt Count   


 


MPV   


 


Neut # (Auto)   


 


Lymph # (Auto)   


 


Mono # (Auto)   


 


Eos # (Auto)   


 


Baso # (Auto)   


 


Absolute Nucleated RBC   


 


Nucleated RBC %   


 


VBG pH   


 


VBG pCO2   


 


VBG pO2   


 


VBG HCO3   


 


VBG Total CO2   


 


VBG O2 Saturation   


 


VBG Base Excess   


 


Sodium  138  


 


Potassium  3.8  


 


Chloride  97 L  


 


Carbon Dioxide  27  


 


Anion Gap  14.0 H  


 


BUN  17  


 


Creatinine  0.9  


 


Estimated GFR (MDRD)  78 L  


 


Glucose  352 H  


 


Estimat Average Glucose   


 


Hemoglobin A1c %   


 


Lactic Acid   


 


Calcium  9.6  


 


Total Bilirubin  0.6  


 


AST  16  


 


ALT  19  


 


Alkaline Phosphatase  91  


 


Troponin I High Sens   3.9 


 


Total Protein  8.7 H  


 


Albumin  3.7  


 


Globulin  5.0 H  


 


Albumin/Globulin Ratio  0.7 L  


 


Lipase  112 H  


 


Nasal Adenovirus (PCR)   


 


Nasal B. parapertussis DNA (PCR)   


 


Nasal Coronavir 229E PCR   


 


Nasal Coronavir HKU1 PCR   


 


Nasal Coronavir NL63 PCR   


 


Nasal Coronavir OC43 PCR   


 


Nasal Enterovir/Rhinovir PCR   


 


Nasal Influenza B PCR   


 


Nasal Influenza A PCR   


 


Nasal Parainfluen 1 PCR   


 


Nasal Parainfluen 2 PCR   


 


Nasal Parainfluen 3 PCR   


 


Nasal Parainfluen 4 PCR   


 


Nasal RSV (PCR)   


 


Nasal B.pertussis DNA PCR   


 


Nasal C.pneumoniae (PCR)   


 


Khanh Human Metapneumo PCR   


 


Nasal M.pneumoniae (PCR)   


 


Nasal SARS-CoV-2 (PCR)   


 


Ethyl Alcohol   


 


Serum Ketones    NEGATIVE














  05/05/21 05/05/21 05/05/21





  12:03 12:06 12:15


 


WBC    7.9


 


RBC    4.05 L


 


Hgb    11.5 L


 


Hct    36.5 L


 


MCV    90.1


 


MCH    28.4


 


MCHC    31.5 L


 


RDW    13.3


 


Plt Count    202


 


MPV    10.7


 


Neut # (Auto)    5.1


 


Lymph # (Auto)    2.1


 


Mono # (Auto)    0.4


 


Eos # (Auto)    0.2


 


Baso # (Auto)    0.1


 


Absolute Nucleated RBC    0.00


 


Nucleated RBC %    0.0


 


VBG pH   


 


VBG pCO2   


 


VBG pO2   


 


VBG HCO3   


 


VBG Total CO2   


 


VBG O2 Saturation   


 


VBG Base Excess   


 


Sodium   


 


Potassium   


 


Chloride   


 


Carbon Dioxide   


 


Anion Gap   


 


BUN   


 


Creatinine   


 


Estimated GFR (MDRD)   


 


Glucose   


 


Estimat Average Glucose   


 


Hemoglobin A1c %   


 


Lactic Acid   


 


Calcium   


 


Total Bilirubin   


 


AST   


 


ALT   


 


Alkaline Phosphatase   


 


Troponin I High Sens   


 


Total Protein   


 


Albumin   


 


Globulin   


 


Albumin/Globulin Ratio   


 


Lipase   


 


Nasal Adenovirus (PCR)   NOT DETECTED 


 


Nasal B. parapertussis DNA (PCR)   NOT DETECTED 


 


Nasal Coronavir 229E PCR   NOT DETECTED 


 


Nasal Coronavir HKU1 PCR   NOT DETECTED 


 


Nasal Coronavir NL63 PCR   NOT DETECTED 


 


Nasal Coronavir OC43 PCR   NOT DETECTED 


 


Nasal Enterovir/Rhinovir PCR   NOT DETECTED 


 


Nasal Influenza B PCR   NOT DETECTED 


 


Nasal Influenza A PCR   NOT DETECTED 


 


Nasal Parainfluen 1 PCR   NOT DETECTED 


 


Nasal Parainfluen 2 PCR   NOT DETECTED 


 


Nasal Parainfluen 3 PCR   NOT DETECTED 


 


Nasal Parainfluen 4 PCR   NOT DETECTED 


 


Nasal RSV (PCR)   NOT DETECTED 


 


Nasal B.pertussis DNA PCR   NOT DETECTED 


 


Nasal C.pneumoniae (PCR)   NOT DETECTED 


 


Khanh Human Metapneumo PCR   NOT DETECTED 


 


Nasal M.pneumoniae (PCR)   NOT DETECTED 


 


Nasal SARS-CoV-2 (PCR)   NOT DETECTED 


 


Ethyl Alcohol  5.2  


 


Serum Ketones   














  05/05/21 05/05/21 05/05/21





  12:15 12:33 12:33


 


WBC   


 


RBC   


 


Hgb   


 


Hct   


 


MCV   


 


MCH   


 


MCHC   


 


RDW   


 


Plt Count   


 


MPV   


 


Neut # (Auto)   


 


Lymph # (Auto)   


 


Mono # (Auto)   


 


Eos # (Auto)   


 


Baso # (Auto)   


 


Absolute Nucleated RBC   


 


Nucleated RBC %   


 


VBG pH    7.349


 


VBG pCO2    53.5 H


 


VBG pO2    30.8


 


VBG HCO3    28.8 H


 


VBG Total CO2    30.4 H


 


VBG O2 Saturation    57.9 L


 


VBG Base Excess    2.2 H


 


Sodium   


 


Potassium   


 


Chloride   


 


Carbon Dioxide   


 


Anion Gap   


 


BUN   


 


Creatinine   


 


Estimated GFR (MDRD)   


 


Glucose   


 


Estimat Average Glucose  301 H  


 


Hemoglobin A1c %  12.1 H  


 


Lactic Acid   2.7 H 


 


Calcium   


 


Total Bilirubin   


 


AST   


 


ALT   


 


Alkaline Phosphatase   


 


Troponin I High Sens   


 


Total Protein   


 


Albumin   


 


Globulin   


 


Albumin/Globulin Ratio   


 


Lipase   


 


Nasal Adenovirus (PCR)   


 


Nasal B. parapertussis DNA (PCR)   


 


Nasal Coronavir 229E PCR   


 


Nasal Coronavir HKU1 PCR   


 


Nasal Coronavir NL63 PCR   


 


Nasal Coronavir OC43 PCR   


 


Nasal Enterovir/Rhinovir PCR   


 


Nasal Influenza B PCR   


 


Nasal Influenza A PCR   


 


Nasal Parainfluen 1 PCR   


 


Nasal Parainfluen 2 PCR   


 


Nasal Parainfluen 3 PCR   


 


Nasal Parainfluen 4 PCR   


 


Nasal RSV (PCR)   


 


Nasal B.pertussis DNA PCR   


 


Nasal C.pneumoniae (PCR)   


 


Khanh Human Metapneumo PCR   


 


Nasal M.pneumoniae (PCR)   


 


Nasal SARS-CoV-2 (PCR)   


 


Ethyl Alcohol   


 


Serum Ketones   














PD MEDICAL DECISION MAKING





- ED course


ED course: 





approved for admission/obs by Dr. Warren





Departure





- Departure


Disposition: ED Place in Observation


Clinical Impression: 


 Syncope, Headache





Discharge Date/Time: 05/05/21 15:02

## 2021-05-05 NOTE — HISTORY & PHYSICAL EXAMINATION
Chief Complaint





- Chief Complaint


Chief Complaint: dizziness





History of Present Illness





- Admitted From


Admitted From:: ER





- History Obtained From


Records Reviewed: [facility]


History obtained from: Pt


Exam Limitations: no





- History of Present Illness


HPI Comment/Other: 





This is a 56-year-old  female with a history of uncontrolled 

Diabetes on metformin, actos and glimiperide, HTN and hyperlipidemia, who prese

nt ER complain of dizziness in her PCP office. pt report she has been feeling 

not well for couple of days. she feel " I am like old lady to walk." Today she 

was in walk in clinic to check out. she feel very dizziness then she lean down 

the one side of her sitting chair. she report she was not lost consciousness. 

she report she know everything happened. she denies focal neurological deficits.

CT of her head on today was unremarkable. pt was recent admitted here to the ICU

for uncontrolled blood sugar, then she developed pneumonia. Also pt was found to

have similar dizziness and collapse in the hospital which pt was found to have 

orthostatic hypotension. After pt was given IVF, her orthostatic hypotension was

resolved. Today in ER, ER provider report she did orthostatic check for pt, pt 

has no orthostatic hypotension or intolerance. Remarkable, pt still present 

hyperglycemia in ER route lab test with mild elevated lactic acid. Patient's 

chest x-ray and urinalysis was not indication for infection. Patient has normal 

range WBC, patient has no fever. Patient did take Metformin in the home which 

could cause mild elevated lactic acid. Patient had a history of uncontrolled 

diabetic. Patient declined taking insulin in the before. She had A1c at the 14 

then 12 in the before about 10 days. Also patient take 3000 mg gabapentin for 

her neuropathy. Gabapentin has side effect to cause people dizziness. Patient 

denies chest pain, fever, chill, shortness of breathing. Medical team was called

by by ER provider for admission. Patient is at observation unit. 


Discussed the care goal with the patient, patient clearly state she want DNR








History





- Past Medical History


Cardiovascular: reports: Hypertension, High cholesterol, Other


Respiratory: reports: None


Endocrine/Autoimmune: reports: Type 2 diabetes


GI: reports: None


GYN: reports: Other


: reports: None


HEENT: reports: Other


Psych: reports: None


Musculoskeletal: reports: None


Derm: reports: None


MRSA Hx?: No





- Past Surgical History


General: reports: Other





- Family & Social History


Family History: Mother: , Father: 


Family History Comment/Other: Dad and Mom both  in their late 60's. They had

DM,HTN, chol, CAD at their deaths. She had 18 siblings. 3 sisters have  of 

cancer; 1 was breast but the other two she doesn't know what kind of cancer. 1 

brother  of GSW. 1 brother  of MI. Her 3 children are healthy


Living Situation: With spouse/s.o.


Social History Notes: she was born in [place] and has lived all over the 

[facility] bc of work and family. She has been  to her 2nd  for 17 

years. They met at her cousins house one night for dinner on a blind date and 

have been together even since. Her 3 children live in [place]. She never 

smoked, and never really even socially drank since she doesn't like the taste of

it. 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 

different locations. [place] time went on and now plan on being

in the [place]





- Substance History


Use: Uses substance without health or social issues: NONE





- POLST


Patient has POLST: No


POLST Status: Full Code





Meds/Allgy





- Home Medications


Home Medications: 


                                Ambulatory Orders











 Medication  Instructions  Recorded  Confirmed


 


Glimepiride [Amaryl] 2 mg PO DAILYWM #30 tablet 18


 


NIFEdipine [Nifedipine ER] 30 mg PO DAILY #30 tablet.er 18


 


Gabapentin 1,200 mg PO BID 18


 


Simvastatin 40 mg PO QPM 18


 


Gabapentin [Neurontin] 600 mg PO 1200 21


 


Pioglitazone HCl [Actos] 30 mg PO DAILY 21


 


Amox/Clav 875/125 [Augmentin 1 tablet PO Q12H #8 tablet 21





875/125 Tab]   


 


Ferric Citrate [Auryxia] 210 mg PO DAILY #30 tablet 21


 


Losartan [Cozaar] 50 mg PO DAILY #30 tablet 21


 


Metformin HCl [Metformin  mg PO DAILY #30 tab.sr 21





Osmotic]   














- Allergies


Allergies/Adverse Reactions: 


                                    Allergies











Allergy/AdvReac Type Severity Reaction Status Date / Time


 


No Known Drug Allergies Allergy   Verified 21 11:46














Review of Systems





- Constitutional


Constitutional: denies: Fatigue, Fever, Chills, Poor appetite, Diaphoresis





- Eyes


Eyes: denies: Pain, Blurred vision, Field loss, Vision loss





- Ears, Nose & Throat


Ears, Nose & Throat: denies: Ear pain, Tinnitus, Nosebleeds, Bleeding gums





- Cardiovascular


Cariovascular: reports: Lightheadedness.  denies: Irregular heart rate, 

Palpitations, Chest pain, Syncope, Exertional dyspnea, Decr. exercise tolerance





- Respiratory


Respiratory: denies: Cough, Wheezing, Hemoptysis, Orthopnea, SOB at rest, SOB 

with exertion





- Gastrointestinal


Gastrointestinal: denies: Abdominal pain, Constipation, Diarrhea, Rectal 

bleeding, Black stools, Bloody stools, Nausea, Vomiting





- Genitourinary


Genitourinary: denies: Dysuria, Urgency, Incontinence





- Musculoskeletal


Musculoskeletal: denies: Muscle pain, Muscle aches, Limited range of motion





- Integumentary


Integumentary: denies: Rash, Lesions, Lumps





- Neurological


Neurological: reports: Dizziness.  denies: General weakness, Focal weakness, 

Headache, Numbness, Memory problems, Pre-existing deficit, Abnormal gait, 

Seizures, Incoordination, Slurred speech





- Psychiatric


Psychiatric: denies: Depression, Suicidal, Delusions





- Endocrine


Endocrine: denies: Polyuria, Polyphagia





- Hematologic/Lymphatic


Hematologic/Lymphatic: denies: Anemia, Petechiae, Blood clots


Prior Level of Functionality: 





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





Exam





- Vital Signs


Vital Signs: 





                                Vital Signs x48h











  Temp Pulse Resp BP Pulse Ox


 


 21 14:38  36.9 C  78  12  137/71 H  97


 


 21 14:07   83  14  145/77 H  99


 


 21 13:32   85  16  121/67  100


 


 21 12:42   75  15  156/82 H  100


 


 21 11:48  36.8 C  80  18  178/85 H  100














- Physical Exam


General Appearance: positive: No acute distress, Alert.  negative: Lethargic


Eyes Bilateral: positive: Normal inspection, PERRL, No lid inflammation


ENT: positive: ENT inspection nml, No signs of dehydration.  negative: Purulent 

nasal drainage


Neck: positive: Nml inspection, Trachea midline.  negative: Thyromegaly, 

Tracheal deviation


Respiratory: positive: Chest non-tender, No respiratory distress, Breath sounds 

nml.  negative: Wheezes, Rales


Cardiovascular: positive: Regular rate & rhythm, No murmur.  negative: 

Tachycardia, Bradycardia, Systolic murmur, Diastolic murmur


Peripheral Pulses: positive: 2+


Abdomen: positive: Non-tender, Nml bowel sounds, No distention.  negative: 

Tenderness


Back: positive: Nml inspection


Skin: positive: Color nml, Warm, Dry.  negative: Cyanosis, Diaphoresis


Extremities: positive: Non-tender, Full ROM, Nml appearance.  negative: Calf 

tenderness


Neurologic/Psychiatric: positive: Oriented x3, Motor nml, Sensation nml, Moo

d/affect nml.  negative: Weakness, Sensory loss, Facial droop, Slurred/abnml 

speech, Depressed mood/affect





Sepsis Event Note (H)





- Evaluation


Current Stage of Sepsis: Ruled out





Conclusion/Plan





- Problem List


(1) Dizziness, nonspecific


Conclusion/Plan: 


Patient reported she feel dizziness and collapse but she denied loss of 

consciousness. This happened in the before in the hospital. she Had 

significantly elevated glucose level 352 today, she takes 3000 mg gabapentin in 

the home. Patient had echo done about 10 days ago Which was unremarkable. 

Troponin test is negative, EKG without significant change from previous. Patient

had a negative orthostatic hypotension or intolerance in the ER per provider 

report. 


Plan:Patient had a 1 L normal saline intravenous in the ER, We will continue 

hydration with intravenous IV fluids for patient, We will give patient insulin 

to control patient hyperglycemia, We will reduce the patient gabapentin dosage 

after discussed with the patient for the side effect of gabapentin to cause 

people dizziness. 








(2) Uncontrolled diabetes mellitus


Conclusion/Plan: 


Patient had A1c 14 then A1c become 12 reported 10 days ago. Patient declined 

take insulin in the home before. After discussed with the patient for Diabetic 

control, She agreed take insulin and possible take insulin d/c to home. We will 

put the patient in Lantus, sliding scale, check glucose level, hypoglycemia 

protocol








(3) Elevated lactic acid level


Conclusion/Plan: 


Patient had a lactic acid 2.7, after intravenous IV fluids lactic acid acid 

become normal range. Patient urinalysis and chest x-ray are not indicated 

infection. Patient had no fever, patient had a normal range WBC. Patient denies 

dysuria, clinically did not show patient has pneumonia symptoms. It is likely 

From patient recently take the Metformin which could have caused mild elevated 

lactic acid. We will hold Metformin, continue give patient intravenous IV 

fluids. Continue 








(4) HTN (hypertension)


Conclusion/Plan: 


Patient blood pressure is stable, patient takes her blood pressure medicine in 

the home, we will resume








- Lab Results


Fish Bones: 


                                 21 12:15





                                 21 12:03





Core Measures





- Anticipated LOS


I expect patient to be DC'd or transferred within 96 hours.: Yes





- DVT/VTE - Prophylaxis


VTE/DVT Device ordered at admit?: Yes


VTE/DVT Prophylaxis med ordered at admit?: Yes

## 2021-05-06 LAB
ANION GAP SERPL CALCULATED.4IONS-SCNC: 9 MMOL/L
BASOPHILS NFR BLD AUTO: 0.1 10^3/UL
BASOPHILS NFR BLD AUTO: 0.8 %
BUN SERPL-MCNC: 14 MG/DL
CALCIUM UR-MCNC: 8.8 MG/DL
CHLORIDE SERPL-SCNC: 103 MMOL/L
CO2 SERPL-SCNC: 29 MMOL/L
CREAT SERPLBLD-SCNC: 0.7 MG/DL
EOSINOPHIL # BLD AUTO: 0.3 10^3/UL
EOSINOPHIL NFR BLD AUTO: 4.2 %
ERYTHROCYTE [DISTWIDTH] IN BLOOD BY AUTOMATED COUNT: 13.5 %
GFRSERPLBLD MDRD-ARVRAT: 105 ML/MIN/{1.73_M2}
GLUCOSE SERPL-MCNC: 168 MG/DL
HCT VFR BLD AUTO: 32.7 %
HGB UR QL STRIP: 10.3 G/DL
LYMPHOCYTES # SPEC AUTO: 2.8 10^3/UL
LYMPHOCYTES NFR BLD AUTO: 36.5 %
MCH RBC QN AUTO: 28.9 PG
MCHC RBC AUTO-ENTMCNC: 31.5 G/DL
MCV RBC AUTO: 91.9 FL
MONOCYTES # BLD AUTO: 0.4 10^3/UL
MONOCYTES NFR BLD AUTO: 5.2 %
NEUTROPHILS # BLD AUTO: 4 10^3/UL
NEUTROPHILS # SNV AUTO: 7.6 X10^3/UL
NEUTROPHILS NFR BLD AUTO: 53 %
NRBC # BLD AUTO: 0 /100WBC
NRBC # BLD AUTO: 0 X10^3/UL
PDW BLD AUTO: 9.7 FL
PLATELET # BLD: 348 10^3/UL
POTASSIUM SERPL-SCNC: 3.5 MMOL/L
RBC MAR: 3.56 10^6/UL
SODIUM SERPLBLD-SCNC: 141 MMOL/L

## 2021-05-06 RX ADMIN — PIOGLITAZONE SCH MG: 15 TABLET ORAL at 09:53

## 2021-05-06 RX ADMIN — DILTIAZEM HYDROCHLORIDE SCH: 120 CAPSULE, COATED, EXTENDED RELEASE ORAL at 09:00

## 2021-05-06 RX ADMIN — SODIUM CHLORIDE SCH MLS/HR: 9 INJECTION, SOLUTION INTRAVENOUS at 00:05

## 2021-05-06 RX ADMIN — GLIMEPIRIDE SCH MG: 2 TABLET ORAL at 08:42

## 2021-05-06 RX ADMIN — SODIUM CHLORIDE, PRESERVATIVE FREE SCH ML: 5 INJECTION INTRAVENOUS at 23:34

## 2021-05-06 RX ADMIN — SODIUM CHLORIDE, PRESERVATIVE FREE SCH ML: 5 INJECTION INTRAVENOUS at 00:05

## 2021-05-06 RX ADMIN — GABAPENTIN SCH MG: 300 CAPSULE ORAL at 09:53

## 2021-05-06 RX ADMIN — GABAPENTIN SCH MG: 300 CAPSULE ORAL at 08:42

## 2021-05-06 RX ADMIN — SODIUM CHLORIDE, PRESERVATIVE FREE SCH: 5 INJECTION INTRAVENOUS at 16:57

## 2021-05-06 RX ADMIN — GABAPENTIN SCH MG: 300 CAPSULE ORAL at 19:45

## 2021-05-06 RX ADMIN — SODIUM CHLORIDE, PRESERVATIVE FREE SCH ML: 5 INJECTION INTRAVENOUS at 23:38

## 2021-05-06 RX ADMIN — INSULIN ASPART SCH UNIT: 100 INJECTION, SOLUTION INTRAVENOUS; SUBCUTANEOUS at 21:14

## 2021-05-06 RX ADMIN — SODIUM CHLORIDE, PRESERVATIVE FREE SCH ML: 5 INJECTION INTRAVENOUS at 08:45

## 2021-05-06 RX ADMIN — SODIUM CHLORIDE SCH MLS/HR: 9 INJECTION, SOLUTION INTRAVENOUS at 09:52

## 2021-05-06 RX ADMIN — INSULIN GLARGINE SCH UNIT: 100 INJECTION, SOLUTION SUBCUTANEOUS at 21:15

## 2021-05-06 RX ADMIN — SODIUM CHLORIDE, PRESERVATIVE FREE SCH: 5 INJECTION INTRAVENOUS at 23:25

## 2021-05-06 RX ADMIN — INSULIN ASPART SCH UNIT: 100 INJECTION, SOLUTION INTRAVENOUS; SUBCUTANEOUS at 08:37

## 2021-05-06 RX ADMIN — Medication SCH AMP: at 21:14

## 2021-05-06 RX ADMIN — ENOXAPARIN SODIUM SCH MG: 100 INJECTION SUBCUTANEOUS at 08:38

## 2021-05-06 RX ADMIN — SODIUM CHLORIDE SCH MLS/HR: 9 INJECTION, SOLUTION INTRAVENOUS at 19:49

## 2021-05-06 RX ADMIN — INSULIN ASPART SCH UNIT: 100 INJECTION, SOLUTION INTRAVENOUS; SUBCUTANEOUS at 11:38

## 2021-05-06 RX ADMIN — INSULIN ASPART SCH: 100 INJECTION, SOLUTION INTRAVENOUS; SUBCUTANEOUS at 16:57

## 2021-05-06 NOTE — PROVIDER PROGRESS NOTE
Assessment/Plan





- Problem List


(1) Orthostatic intolerance


Assessment/Plan: 





5/6 Pt present orthostatic intolerance. pt has hx of uncontrolled diabetic, it 

is likely vasovagal. pt had ECHO done about 10 days ago which was unremarkable. 

EKG and troponin are unremarkable at this time. hold pt's BP meds, IVF for pt, 

fall precaution. tried to reduce pt's Gabapentin dosage but pt declined to do 

that. 





(2) Dizziness, nonspecific


Conclusion/Plan: 


5/6 pt still complain of dizziness when she try to stand up. pt has orthostatic 

intolerance. hold pt's BP meds, IVF for pt, fall precaution. tried to reduce 

pt's Gabapentin dosage but pt declined to do that. pt had ECHO done about 10 

days ago which was unremarkable. 





Patient reported she feel dizziness and collapse but she denied loss of 

consciousness. This happened in the before in the hospital. she Had 

significantly elevated glucose level 352 today, she takes 3000 mg gabapentin in 

the home. Patient had echo done about 10 days ago Which was unremarkable. 

Troponin test is negative, EKG without significant change from previous. Patient

had a negative orthostatic hypotension or intolerance in the ER per provider re

port. 


Plan:Patient had a 1 L normal saline intravenous in the ER, We will continue 

hydration with intravenous IV fluids for patient, We will give patient insulin 

to control patient hyperglycemia, We will reduce the patient gabapentin dosage 

after discussed with the patient for the side effect of gabapentin to cause 

people dizziness. 








(3) Uncontrolled diabetes mellitus


Conclusion/Plan: 


5/6 her glucose is good control, today she has glucose 168, will continue lantus

and slide scale. pt agree to have insulin for d/c





Patient had A1c 14 then A1c become 12 reported 10 days ago. Patient declined 

take insulin in the home before. After discussed with the patient for Diabetic 

control, She agreed take insulin and possible take insulin d/c to home. We will 

put the patient in Lantus, sliding scale, check glucose level, hypoglycemia 

protocol








(4) Elevated lactic acid level


Conclusion/Plan: 


5/6 resolved.





Patient had a lactic acid 2.7, after intravenous IV fluids lactic acid acid 

become normal range. Patient urinalysis and chest x-ray are not indicated 

infection. Patient had no fever, patient had a normal range WBC. Patient denies 

dysuria, clinically did not show patient has pneumonia symptoms. It is likely 

From patient recently take the Metformin which could have caused mild elevated 

lactic acid. We will hold Metformin, continue give patient intravenous IV 

fluids. Continue 








(5) HTN (hypertension)


Conclusion/Plan: 


Patient blood pressure is stable, patient takes her blood pressure medicine in 

the home, we will resume


























- Current Meds


Current Meds: 





                               Current Medications











Generic Name Dose Route Start Last Admin





  Trade Name Freq  PRN Reason Stop Dose Admin


 


Enoxaparin Sodium  40 mg  05/06/21 09:00  05/06/21 08:38





  Enoxaparin 40 Mg/0.4 Ml Syringe  SUBQ   40 mg





  DAILY DK   Administration


 


Gabapentin  600 mg  05/05/21 19:30  05/05/21 20:47





  Gabapentin 300 Mg Capsule  PO   600 mg





  1200 DK   Administration


 


Gabapentin  1,200 mg  05/06/21 10:00  05/06/21 09:53





  Gabapentin 300 Mg Capsule  PO   1,200 mg





  BID DK   Administration


 


Glimepiride  2 mg  05/06/21 08:00  05/06/21 08:42





  Glimepiride 2 Mg Tablet  PO   2 mg





  DAILYWM DK   Administration


 


Sodium Chloride  1,000 mls @ 100 mls/hr  05/05/21 15:00  05/06/21 09:52





  Normal Saline 0.9%  IV   100 mls/hr





  .Q10H DK   Administration


 


Insulin Aspart  2 - 10 unit  05/05/21 17:00  05/06/21 11:38





  Insulin Aspart 300 Unit/3 Ml Pen  SUBQ   6 unit





  0800,1200,1700,2100 DK   Administration





  Protocol  


 


Insulin Glargine  10 unit  05/05/21 21:00  05/05/21 20:47





  Insulin Glargine 300 Unit/3 Ml Pen  SUBQ   10 unit





  QPM DK   Administration


 


Ondansetron HCl  4 mg  05/05/21 14:32  05/06/21 08:31





  Ondansetron 4 Mg/2 Ml Vial  IVP   4 mg





  Q6HR PRN   Administration





  Nausea / Vomiting  


 


Ferric Citrate [  1 each  05/06/21 09:00  05/06/21 09:00





  Auryxia] 210 Mg Tab  PO   Not Given





  DAILY DK  


 


Pioglitazone HCl  30 mg  05/06/21 09:00  05/06/21 09:53





  Pioglitazone 15 Mg Tablet  PO   30 mg





  DAILY DK   Administration


 


Sodium Chloride  10 ml  05/05/21 17:00  05/06/21 08:45





  Sodium Chloride Flush 0.9% 10 Ml Syringe  IVP   10 ml





  0100,0900,1700 [facility]   Administration














- Lab Result


Fish [place] Diagrams: 


                                 05/06/21 04:45





                                 05/06/21 04:45





- Additional Planning


My Orders: 





My Active Orders





05/05/21 14:32


Activity Orders [RC] Q2HR 


IO [RC] IOSHIFT 


Initiate Bowel Care Protocol [RC] .protocol 


Initiate Line Care Protocol [RC] QSHIFT 


Initiate Personal Care Protoco [RC] .protocol 


Telemetry- [RC] Q4HR 


Vital Signs [RC] Q4HR 


Acetaminophen [Tylenol]   650 mg PO Q4HR PRN 


Ondansetron Inj [Zofran Inj]   4 mg IVP Q6HR PRN 


Sodium Chloride Flush 0.9% [Normal Saline Flush 0.9%]   10 ml IVP PRN PRN 


Code Status [OTHERS] Routine 


Condition of Patient [OTHERS] Routine 


DVT Prophylaxis [OTHERS] Routine 





05/05/21 14:36


IV Insert [RC] .ONCE 


SCDs [RC] QSHIFT 





05/05/21 14:38


Blood Glucose Checks - Eating [RC] 0800,1200,1700,2100 


Initiate Hypoglycemia Protocol [RC] .protocol 





05/05/21 14:43


Orthostatic [Vital Signs - Orthostatic] [RC] DAILY 





05/05/21 15:00


Sodium Chloride 0.9% [Normal Saline 0.9%] 1,000 ml  mls/hr 





05/05/21 16:05


Code Status [OTHERS] Routine 





05/05/21 Dinner


Carb-controlled Diet [DIET] 





05/05/21 17:00


Insulin Aspart [NovoLOG]   2 - 10 unit SUBQ 0800,1200,1700,2100 


Sodium Chloride Flush 0.9% [Normal Saline Flush 0.9%]   10 ml IVP 0100,0900,1700







05/05/21 17:08


Out of bed 3+ hours today [RC] TID 





05/05/21 19:30


Gabapentin [Neurontin]   600 mg PO 1200 





05/05/21 21:00


Insulin Glargine [Lantus Solostar]   10 unit SUBQ QPM 





05/06/21


RN Diabetes Educator Consult [CONS] Routine 





05/06/21 07:47


Diabetic Education [RC] Q8H 





05/06/21 08:00


Glimepiride [Amaryl]   2 mg PO DAILYWM 





05/06/21 09:00


Enoxaparin [Lovenox]   40 mg SUBQ DAILY 


Patient Own Med [Patient Own Medication]   1 each PO DAILY 


Pioglitazone [Actos]   30 mg PO DAILY 





05/06/21 10:00


Gabapentin [Neurontin]   1,200 mg PO BID 





05/06/21 21:00


ethyl alcohoL 62% swab [NoZin]   1 amp LENKA BID 





05/07/21 05:00


BMP - BASIC METABOLIC PANEL [CHEM] DAILYLAB 


CBC - COMP BLD CT W/AUTO DIFF [HEME] DAILYLAB 





05/08/21 05:00


BMP - BASIC METABOLIC PANEL [CHEM] DAILYLAB 


CBC - COMP BLD CT W/AUTO DIFF [HEME] DAILYLAB 





05/09/21 05:00


BMP - BASIC METABOLIC PANEL [CHEM] DAILYLAB 


CBC - COMP BLD CT W/AUTO DIFF [HEME] DAILYLAB 





05/10/21 05:00


BMP - BASIC METABOLIC PANEL [CHEM] DAILYLAB 


CBC - COMP BLD CT W/AUTO DIFF [HEME] DAILYLAB 














Subjective





- Subjective


Patient Reports: Feeling Better





Objective


Vital Signs: 





                               Vital Signs - 24 hr











  05/05/21 05/05/21 05/05/21





  12:42 13:32 14:07


 


Temperature   


 


Heart Rate 75 85 83


 


Heart Rate [   





Brachial]   


 


Respiratory 15 16 14





Rate   


 


Blood Pressure 156/82 H 121/67 145/77 H


 


Blood Pressure   





[Right Brachial   





artery]   


 


O2 Saturation 100 100 99














  05/05/21 05/05/21 05/05/21





  14:38 15:10 23:53


 


Temperature 36.9 C 36.9 C 36.8 C


 


Heart Rate 78  


 


Heart Rate [  81 79





Brachial]   


 


Respiratory 12 16 15





Rate   


 


Blood Pressure 137/71 H  


 


Blood Pressure  131/97 H 132/71 H





[Right Brachial   





artery]   


 


O2 Saturation 97 96 95














  05/06/21 05/06/21 05/06/21





  05:00 07:32 11:20


 


Temperature 36.8 C 37.8 C 36.8 C


 


Heart Rate   


 


Heart Rate [ 76 74 72





Brachial]   


 


Respiratory 17 16 16





Rate   


 


Blood Pressure   


 


Blood Pressure 148/71 H 148/82 H 153/75 H





[Right Brachial   





artery]   


 


O2 Saturation 96 97 98








                                     Oxygen











O2 Source                      Room air














I&O (Last 24 Hrs): 





                          Intake and Output Totals x24h











 05/04/21 05/05/21 05/06/21





 23:59 23:59 23:59


 


Intake Total  1390 2246.666


 


Output Total  1600 1200


 


Balance  -210 1046.666











General: Alert, Oriented x3, Cooperative, No acute distress


HEENT: Atraumatic


Neck: Supple


Lymphatic: no adenopathy


Neuro: Alert, Non Focal, Oriented Times 3


Cardiovascular: Regular rate, Normal S1, Normal S2


Respiratory: Chest non-tender, No respiratory distress, Breath sounds nml


Abdomen: Normal bowel sounds, Soft


Extremities: Normal pulses





- Results


Results: 





                               Laboratory Results











WBC  7.6 x10^3/uL (4.8-10.8)   05/06/21  04:45    


 


RBC  3.56 10^6/uL (4.20-5.40)  L  05/06/21  04:45    


 


Hgb  10.3 g/dL (12.0-16.0)  L  05/06/21  04:45    


 


Hct  32.7 % (37.0-47.0)  L  05/06/21  04:45    


 


MCV  91.9 fL (81.0-99.0)   05/06/21  04:45    


 


MCH  28.9 pg (27.0-31.0)   05/06/21  04:45    


 


MCHC  31.5 g/dL (32.0-36.0)  L  05/06/21  04:45    


 


RDW  13.5 % (12.0-15.0)   05/06/21  04:45    


 


Plt Count  348 10^3/uL (130-450)   05/06/21  04:45    


 


MPV  9.7 fL (7.9-10.8)   05/06/21  04:45    


 


Neut # (Auto)  4.0 10^3/uL (1.5-6.6)   05/06/21  04:45    


 


Lymph # (Auto)  2.8 10^3/uL (1.5-3.5)   05/06/21  04:45    


 


Mono # (Auto)  0.4 10^3/uL (0.0-1.0)   05/06/21  04:45    


 


Eos # (Auto)  0.3 10^3/uL (0.0-0.7)   05/06/21  04:45    


 


Baso # (Auto)  0.1 10^3/uL (0.0-0.1)   05/06/21  04:45    


 


Absolute Nucleated RBC  0.00 x10^3/uL  05/06/21  04:45    


 


Nucleated RBC %  0.0 /100WBC  05/06/21  04:45    


 


VBG pH  7.349  (7.31-7.41)   05/05/21  12:33    


 


VBG pCO2  53.5 mmHg (41-51)  H  05/05/21  12:33    


 


VBG pO2  30.8 mmHg (25-47)   05/05/21  12:33    


 


VBG HCO3  28.8 mmol/L (23-28)  H  05/05/21  12:33    


 


VBG Total CO2  30.4 mmol/L (24-29)  H  05/05/21  12:33    


 


VBG O2 Saturation  57.9 % (60-80)  L  05/05/21  12:33    


 


VBG Base Excess  2.2 mmol/L (-2 - +2)  H  05/05/21  12:33    


 


Sodium  141 mmol/L (135-145)   05/06/21  04:45    


 


Potassium  3.5 mmol/L (3.5-5.0)   05/06/21  04:45    


 


Chloride  103 mmol/L (101-111)   05/06/21  04:45    


 


Carbon Dioxide  29 mmol/L (21-32)   05/06/21  04:45    


 


Anion Gap  9.0  (6-13)   05/06/21  04:45    


 


BUN  14 mg/dL (6-20)   05/06/21  04:45    


 


Creatinine  0.7 mg/dL (0.4-1.0)   05/06/21  04:45    


 


Estimated GFR (MDRD)  105  (>89)   05/06/21  04:45    


 


Glucose  168 mg/dL ()  H  05/06/21  04:45    


 


Estimat Average Glucose  301 mg/dL ()  H  05/05/21  12:15    


 


Hemoglobin A1c %  12.1 % (4.27-6.07)  H  05/05/21  12:15    


 


Lactic Acid  1.8 mmol/L (0.5-2.2)   05/05/21  15:39    


 


Calcium  8.8 mg/dL (8.5-10.3)   05/06/21  04:45    


 


Total Bilirubin  0.6 mg/dL (0.2-1.0)   05/05/21  12:03    


 


AST  16 IU/L (10-42)   05/05/21  12:03    


 


ALT  19 IU/L (10-60)   05/05/21  12:03    


 


Alkaline Phosphatase  91 IU/L ()   05/05/21  12:03    


 


Troponin I High Sens  3.9 ng/L (2.3-14.8)   05/05/21  12:03    


 


Total Protein  8.7 g/dL (6.7-8.2)  H  05/05/21  12:03    


 


Albumin  3.7 g/dL (3.2-5.5)   05/05/21  12:03    


 


Globulin  5.0 g/dL (2.1-4.2)  H  05/05/21  12:03    


 


Albumin/Globulin Ratio  0.7  (1.0-2.2)  L  05/05/21  12:03    


 


Lipase  112 U/L (22-51)  H  05/05/21  12:03    


 


Urine Color  YELLOW   05/05/21  15:42    


 


Urine Clarity  CLEAR  (CLEAR)   05/05/21  15:42    


 


Urine pH  7.0 PH (5.0-7.5)   05/05/21  15:42    


 


Ur Specific Gravity  1.015  (1.002-1.030)   05/05/21  15:42    


 


Urine Protein  NEGATIVE mg/dL (NEGATIVE)   05/05/21  15:42    


 


Urine Glucose (UA)  >=1000 mg/dL (NEGATIVE)  H  05/05/21  15:42    


 


Urine Ketones  NEGATIVE mg/dL (NEGATIVE)   05/05/21  15:42    


 


Urine Occult Blood  NEGATIVE  (NEGATIVE)   05/05/21  15:42    


 


Urine Nitrite  NEGATIVE  (NEGATIVE)   05/05/21  15:42    


 


Urine Bilirubin  NEGATIVE  (NEGATIVE)   05/05/21  15:42    


 


Urine Urobilinogen  0.2 (NORMAL) E.U./dL (NORMAL)   05/05/21  15:42    


 


Ur Leukocyte Esterase  NEGATIVE  (NEGATIVE)   05/05/21  15:42    


 


Ur Microscopic Review  NOT INDICATED   05/05/21  15:42    


 


Nasal Adenovirus (PCR)  NOT DETECTED   05/05/21  12:06    


 


Nasal B. parapertussis DNA (PCR)  NOT DETECTED   05/05/21  12:06    


 


Nasal Coronavir 229E PCR  NOT DETECTED   05/05/21  12:06    


 


Nasal Coronavir HKU1 PCR  NOT DETECTED   05/05/21  12:06    


 


Nasal Coronavir NL63 PCR  NOT DETECTED   05/05/21  12:06    


 


Nasal Coronavir OC43 PCR  NOT DETECTED   05/05/21  12:06    


 


Nasal Enterovir/Rhinovir PCR  NOT DETECTED   05/05/21  12:06    


 


Nasal Influenza B PCR  NOT DETECTED   05/05/21  12:06    


 


Nasal Influenza A PCR  NOT DETECTED   05/05/21  12:06    


 


Nasal Parainfluen 1 PCR  NOT DETECTED   05/05/21  12:06    


 


Nasal Parainfluen 2 PCR  NOT DETECTED   05/05/21  12:06    


 


Nasal Parainfluen 3 PCR  NOT DETECTED   05/05/21  12:06    


 


Nasal Parainfluen 4 PCR  NOT DETECTED   05/05/21  12:06    


 


Nasal RSV (PCR)  NOT DETECTED   05/05/21  12:06    


 


Nasal Screen MRSA (PCR)  POSITIVE  (NEGATIVE)  A*  05/05/21  18:00    


 


Nasal B.pertussis DNA PCR  NOT DETECTED   05/05/21  12:06    


 


Nasal C.pneumoniae (PCR)  NOT DETECTED   05/05/21  12:06    


 


Lenka Human Metapneumo PCR  NOT DETECTED   05/05/21  12:06    


 


Nasal M.pneumoniae (PCR)  NOT DETECTED   05/05/21  12:06    


 


Nasal SARS-CoV-2 (PCR)  NOT DETECTED   05/05/21  12:06    


 


Urine Opiates Screen  NEGATIVE  (NEGATIVE)   05/05/21  15:42    


 


Ur Oxycodone Screen  NEGATIVE  (NEGATIVE)   05/05/21  15:42    


 


Urine Methadone Screen  NEGATIVE  (NEGATIVE)   05/05/21  15:42    


 


Ur Propoxyphene Screen  NEGATIVE  (NEGATIVE)   05/05/21  15:42    


 


Ur Barbiturates Screen  NEGATIVE  (NEGATIVE)   05/05/21  15:42    


 


Ur Tricyclics Screen  NEGATIVE  (NEGATIVE)   05/05/21  15:42    


 


Ur Phencyclidine Scrn  NEGATIVE  (NEGATIVE)   05/05/21  15:42    


 


Ur Amphetamine Screen  NEGATIVE  (NEGATIVE)   05/05/21  15:42    


 


U Methamphetamines Scrn  NEGATIVE  (NEGATIVE)   05/05/21  15:42    


 


U Benzodiazepines Scrn  NEGATIVE  (NEGATIVE)   05/05/21  15:42    


 


Urine Cocaine Screen  NEGATIVE  (NEGATIVE)   05/05/21  15:42    


 


U Cannabinoids Screen  NEGATIVE  (NEGATIVE)   05/05/21  15:42    


 


Ethyl Alcohol  5.2 mg/dL  05/05/21  12:03    


 


Serum Ketones  NEGATIVE  (NEGATIVE)   05/05/21  12:03    














Sepsis Event Note (H)





- Evaluation


Current Stage of Sepsis: Ruled out





ABX Reporting


Has patient been on IV antibiotics over the past 48 hours?: No





Current Medications





- Current Medications


Current Medications: 








Active Medications





Acetaminophen (Acetaminophen 325 Mg Tablet)  650 mg PO Q4HR PRN


   PRN Reason: Pain 1 to 4


Alcohol (Ethyl Alcohol 62% Swab Ampule)  1 amp LENKA BID [facility]


Enoxaparin Sodium (Enoxaparin 40 Mg/0.4 Ml Syringe)  40 mg SUBQ DAILY [facility]


   Last Admin: 05/06/21 08:38 Dose:  40 mg


   Documented by: 


Gabapentin (Gabapentin 300 Mg Capsule)  600 mg PO 1200 [facility]


   Last Admin: 05/05/21 20:47 Dose:  600 mg


   Documented by: 


Gabapentin (Gabapentin 300 Mg Capsule)  1,200 mg PO BID [facility]


   Last Admin: 05/06/21 09:53 Dose:  1,200 mg


   Documented by: 


Glimepiride (Glimepiride 2 Mg Tablet)  2 mg PO DAILYWM [facility]


   Last Admin: 05/06/21 08:42 Dose:  2 mg


   Documented by: 


Sodium Chloride (Normal Saline 0.9%)  1,000 mls @ 100 mls/hr IV .Q10H [facility]


   Last Admin: 05/06/21 09:52 Dose:  100 mls/hr


   Documented by: 


Insulin Aspart (Insulin Aspart 300 Unit/3 Ml Pen)  2 - 10 unit SUBQ 

0800,1200,1700,2100 [facility]; Protocol


   Last Admin: 05/06/21 11:38 Dose:  6 unit


   Documented by: 


Insulin Glargine (Insulin Glargine 300 Unit/3 Ml Pen)  10 unit SUBQ QPM [facility]


   Last Admin: 05/05/21 20:47 Dose:  10 unit


   Documented by: 


Ondansetron HCl (Ondansetron 4 Mg/2 Ml Vial)  4 mg IVP Q6HR PRN


   PRN Reason: Nausea / Vomiting


   Last Admin: 05/06/21 08:31 Dose:  4 mg


   Documented by: 


Ferric Citrate [ (Auryxia] 210 Mg Tab)  1 each PO DAILY [facility]


   Last Admin: 05/06/21 09:00 Dose:  Not Given


   Documented by: 


Pioglitazone HCl (Pioglitazone 15 Mg Tablet)  30 mg PO DAILY [facility]


   Last Admin: 05/06/21 09:53 Dose:  30 mg


   Documented by: 


Sodium Chloride (Sodium Chloride Flush 0.9% 10 Ml Syringe)  10 ml IVP PRN PRN


   PRN Reason: AS NEEDED PER PROVIDER ORDERS


Sodium Chloride (Sodium Chloride Flush 0.9% 10 Ml Syringe)  10 ml IVP 

0100,0900,1700 DK


   Last Admin: 05/06/21 08:45 Dose:  10 ml


   Documented by: 





                                        





Gabapentin 1,200 mg PO BID 06/27/18 


Simvastatin 40 mg PO QPM 06/27/18 


Gabapentin [Neurontin] 600 mg PO 1200 04/21/21 


Pioglitazone HCl [Actos] 30 mg PO DAILY 04/21/21

## 2021-05-07 VITALS — DIASTOLIC BLOOD PRESSURE: 73 MMHG | SYSTOLIC BLOOD PRESSURE: 130 MMHG

## 2021-05-07 LAB
ANION GAP SERPL CALCULATED.4IONS-SCNC: 10 MMOL/L
BASOPHILS NFR BLD AUTO: 0.1 10^3/UL
BASOPHILS NFR BLD AUTO: 0.9 %
BUN SERPL-MCNC: 13 MG/DL
CALCIUM UR-MCNC: 8.9 MG/DL
CHLORIDE SERPL-SCNC: 105 MMOL/L
CO2 SERPL-SCNC: 28 MMOL/L
CREAT SERPLBLD-SCNC: 0.7 MG/DL
EOSINOPHIL # BLD AUTO: 0.5 10^3/UL
EOSINOPHIL NFR BLD AUTO: 7.6 %
ERYTHROCYTE [DISTWIDTH] IN BLOOD BY AUTOMATED COUNT: 13.2 %
GFRSERPLBLD MDRD-ARVRAT: 105 ML/MIN/{1.73_M2}
GLUCOSE SERPL-MCNC: 102 MG/DL
HCT VFR BLD AUTO: 33.6 %
HGB UR QL STRIP: 10.9 G/DL
LYMPHOCYTES # SPEC AUTO: 2.6 10^3/UL
LYMPHOCYTES NFR BLD AUTO: 41.3 %
MCH RBC QN AUTO: 29.3 PG
MCHC RBC AUTO-ENTMCNC: 32.4 G/DL
MCV RBC AUTO: 90.3 FL
MONOCYTES # BLD AUTO: 0.4 10^3/UL
MONOCYTES NFR BLD AUTO: 6.8 %
NEUTROPHILS # BLD AUTO: 2.7 10^3/UL
NEUTROPHILS # SNV AUTO: 6.3 X10^3/UL
NEUTROPHILS NFR BLD AUTO: 43.2 %
NRBC # BLD AUTO: 0 /100WBC
NRBC # BLD AUTO: 0 X10^3/UL
PDW BLD AUTO: 9.7 FL
PLATELET # BLD: 326 10^3/UL
POTASSIUM SERPL-SCNC: 3.5 MMOL/L
RBC MAR: 3.72 10^6/UL
SODIUM SERPLBLD-SCNC: 143 MMOL/L

## 2021-05-07 RX ADMIN — INSULIN ASPART SCH UNIT: 100 INJECTION, SOLUTION INTRAVENOUS; SUBCUTANEOUS at 08:02

## 2021-05-07 RX ADMIN — PIOGLITAZONE SCH MG: 15 TABLET ORAL at 08:18

## 2021-05-07 RX ADMIN — Medication SCH AMP: at 08:00

## 2021-05-07 RX ADMIN — INSULIN ASPART SCH UNIT: 100 INJECTION, SOLUTION INTRAVENOUS; SUBCUTANEOUS at 11:42

## 2021-05-07 RX ADMIN — ENOXAPARIN SODIUM SCH MG: 100 INJECTION SUBCUTANEOUS at 08:01

## 2021-05-07 RX ADMIN — SODIUM CHLORIDE, PRESERVATIVE FREE SCH ML: 5 INJECTION INTRAVENOUS at 08:02

## 2021-05-07 RX ADMIN — SODIUM CHLORIDE SCH MLS/HR: 9 INJECTION, SOLUTION INTRAVENOUS at 05:50

## 2021-05-07 RX ADMIN — GABAPENTIN SCH MG: 300 CAPSULE ORAL at 11:46

## 2021-05-07 RX ADMIN — DILTIAZEM HYDROCHLORIDE SCH: 120 CAPSULE, COATED, EXTENDED RELEASE ORAL at 08:19

## 2021-05-07 RX ADMIN — GLIMEPIRIDE SCH MG: 2 TABLET ORAL at 08:01

## 2021-05-07 RX ADMIN — GABAPENTIN SCH MG: 300 CAPSULE ORAL at 08:01

## 2021-05-07 NOTE — DISCHARGE PLAN
Discharge Plan


Problem Reviewed?: Yes


Disposition: 01 Home, Self Care


Condition: Stable


Prescriptions: 


Blood-Glucose Meter [Glucometer] 1 each  QID #1 each


Blood Sugar Diagnostic [Glucometer Strips] 1 each  QID #200 strip


Lancets 1 each  QID #200 each


Insulin Glargine [Lantus Solostar] 10 unit SUBQ QPM #5


Insulin Aspart [NovoLOG] 2 - 10 unit SUBQ 0800,1200,1700,2100 #10


Diet: Diabetic


Activity Restrictions: Activity as Tolerated


Shower Restrictions: No (fall precaution)


Instruction Topics:  Dizziness Balance Probs Fainting, Dizziness Fainting Poss 

Causes, ED Near Syncope Vasovagal, ED Dizziness Syncope Fainting W Pre, Log 

Blood Sugar, Diabetes Long Term Complications, Hyperglycemia, Hypoglycemia, 

Diabetes Type 2 Coping, Insulin Injected, Diabetes Carbs


Health Concerns: 


dizziness, uncontrolled diabetes


Plan of Treatment: 


your dizziness is resolved. you may keep hydration at home, hold your home BP 

meds Nifidipine now, fall precaution.


You agree to start insulin for your diabetes. Insulin and device for insulin 

injection are prescribed for you, and you are educated how to use insulin 

management of your diabetes. You may hold your Metformin for your increased 

lactic acid. You may followup with your PCP in one week to continue management 

of your diabetes.  


Care Goals: 


stabilization and improvement of your medical conditions


Assessment: 


discussed the care plan in detail with you, answered your questions, you 

understood and agreed. 


Additional Instructions or Follow Up instructions: 


You may followup with your PCP in one week. Should your symptoms return or 

worsen, you may present ER or call 911 for help.


Follow-Up Care: [facility] - Diabetes Ed


No Smoking: If you smoke, Please STOP!  Call [phone] for help.


Follow-up with: 


Yuri Sanchez MD [Primary Care Provider] -

## 2021-05-07 NOTE — DISCHARGE SUMMARY
Discharge Summary


Admit Date: 05/05/21


Discharge Date: 05/07/21


Discharging Provider: Abel Hernandez


Primary Care Provider: Jairon Staton


Condition at Discharge: Stable


Discharge Disposition: 01 Home, Self Care


Discharge Facility Name: home





- DIAGNOSES


Discharge Diagnoses with Status of Each Condition: 





(1) Orthostatic intolerance


resolved. PT/OT also evaluated and treat pt. pt walked, good balance, and no 

dizziness. BP check show orthostatic intolerance is resolved. pt's home 

Nifidipine is hold, continue Losartan. 





(2) Dizziness, nonspecific


resolved. pt had good balance to walk, no dizziness. keep hydration at home. BP 

check show orthostatic intolerance is resolved. pt's home Nifidipine is hold, 

continue Losartan. 





(3) Uncontrolled diabetes mellitus


pt had A1C 12.1. pt agree to start insulin therapy. insulin, and insulin 

management device are all prescribed for pt. pt had education for insulin 

management in the hospital. pt may hold Metformin which is likely cause pt's 

elevated lactic acid level.





(4) Elevated lactic acid level


resolved. pt may hold Metformin which is likely cause pt's elevated lactic acid 

level.





(5) HTN (hypertension)


stable. 





- HPI


History of Present Illness: 





This is a 56-year-old  female with a history of uncontrolled 

Diabetes on metformin, actos and glimiperide, HTN and hyperlipidemia, who 

present ER complain of dizziness in her PCP office. pt report she has been 

feeling not well for couple of days. she feel " I am like old lady to walk." 

Today she was in walk in clinic to check out. she feel very dizziness then she 

lean down the one side of her sitting chair. she report she was not lost 

consciousness. she report she know everything happened. she denies focal 

neurological deficits. CT of her head on today was unremarkable. pt was recent 

admitted here to the ICU for uncontrolled blood sugar, then she developed 

pneumonia. Also pt was found to have similar dizziness and collapse in the 

hospital which pt was found to have orthostatic hypotension. After pt was given 

IVF, her orthostatic hypotension was resolved. Today in ER, ER provider report 

she did orthostatic check for pt, pt has no orthostatic hypotension or int

olerance. Remarkable, pt still present hyperglycemia in ER route lab test with 

mild elevated lactic acid. Patient's chest x-ray and urinalysis was not 

indication for infection. Patient has normal range WBC, patient has no fever. 

Patient did take Metformin in the home which could cause mild elevated lactic 

acid. Patient had a history of uncontrolled diabetic. Patient declined taking 

insulin in the before. She had A1c at the 14 then 12 in the before about 10 

days. Also patient take 3000 mg gabapentin for her neuropathy. Gabapentin has 

side effect to cause people dizziness. Patient denies chest pain, fever, chill, 

shortness of breathing. Medical team was called by by ER provider for admission.

Patient is at observation unit. 


Discussed the care goal with the patient, patient clearly state she want DNR





- HOSPITAL COURSE


Hospital Course: 


Patient was admitted for dizziness, orthostatic intolerance, uncontrolled 

diabetic. After the patient was treated with intravenous IV fluids, hold patient

blood pressure medications, sliding scale and Lantus insulins for patient, And 

PT and OT evaluation and treatment. Patient's dizziness and orthostatic 

intolerance was resolved. Patient walk with good balance and no dizziness. 

Patient agree to start with the insulin for her diabetic management. Patient is 

prescribed insulin, insulin management device, and pt had education for insulin 

management. 








- ALLERGIES


Allergies/Adverse Reactions: 


                                    Allergies











Allergy/AdvReac Type Severity Reaction Status Date / Time


 


No Known Drug Allergies Allergy   Verified 05/05/21 11:46














- MEDICATIONS


Home Medications: 


                                Ambulatory Orders











 Medication  Instructions  Recorded  Confirmed


 


Glimepiride [Amaryl] 2 mg PO DAILYWM #30 tablet 03/26/18 05/05/21


 


Gabapentin 1,200 mg PO BID 06/27/18 05/05/21


 


Simvastatin 40 mg PO QPM 06/27/18 05/05/21


 


Gabapentin [Neurontin] 600 mg PO 1200 04/21/21 05/05/21


 


Pioglitazone HCl [Actos] 30 mg PO DAILY 04/21/21 05/05/21


 


Amox/Clav 875/125 [Augmentin 1 tablet PO Q12H #8 tablet 04/25/21 05/05/21





875/125 Tab]   


 


Ferric Citrate [Auryxia] 210 mg PO DAILY #30 tablet 04/25/21 05/05/21


 


Losartan [Cozaar] 50 mg PO DAILY #30 tablet 04/25/21 05/05/21


 


Blood Sugar Diagnostic [Glucometer 1 each  QID #200 strip 05/07/21 





Strips]   


 


Blood-Glucose Meter [Glucometer] 1 each  QID #1 each 05/07/21 


 


Insulin Aspart [NovoLOG] 2 - 10 unit SUBQ 05/07/21 





 0800,1200,1700,2100 #10  


 


Insulin Glargine [Lantus Solostar] 10 unit SUBQ QPM #5 05/07/21 


 


Lancets 1 each  QID #200 each 05/07/21 














- PHYSICAL EXAM AT DISCHARGE


General Appearance: positive: No acute distress, Alert.  negative: Lethargic


Eyes Bilateral: positive: Normal inspection, PERRL, No lid inflammation


ENT: positive: ENT inspection nml, No signs of dehydration.  negative: Purulent 

nasal drainage


Neck: positive: Nml inspection, Trachea midline.  negative: Thyromegaly, 

Tracheal deviation


Respiratory: positive: Chest non-tender, No respiratory distress, Breath sounds 

nml.  negative: Wheezes, Rales


Cardiovascular: positive: Regular rate & rhythm, No murmur.  negative: 

Tachycardia, Bradycardia, Systolic murmur, Diastolic murmur


Peripheral Pulses: positive: 2+


Abdomen: positive: Non-tender, Nml bowel sounds, No distention.  negative: 

Tenderness, Guarding


Back: positive: Nml inspection


Skin: positive: Color nml, Warm, Dry.  negative: Cyanosis, Diaphoresis


Extremities: positive: Non-tender, Full ROM, Nml appearance.  negative: Calf 

tenderness


Neurologic/Psychiatric: positive: Oriented x3, Motor nml, Sensation nml, 

Mood/affect nml.  negative: Weakness, Sensory loss, Facial droop, Slurred/abnml 

speech, Depressed mood/affect





- LABS


Result Diagrams: 


                                 05/07/21 04:45





                                 05/07/21 04:45





- SEPSIS


Current Stage of Sepsis: Ruled out





- FOLLOW UP


Follow Up: 


your dizziness is resolved. you may keep hydration at home, hold your home BP 

meds Nifidipine now, fall precaution.


You agree to start insulin for your diabetes. Insulin and device for insulin 

injection are prescribed for you, and you are educated how to use insulin 

management of your diabetes. You may hold your Metformin for your increased 

lactic acid. You may followup with your PCP in one week to continue management 

of your diabetes. 


You may followup with your PCP in one week. Should your symptoms return or 

worsen, you may present ER or call 911 for help. 








- TIME SPENT


Time Spent in Discharge (Minutes): 30

## 2021-05-13 ENCOUNTER — HOSPITAL ENCOUNTER (OUTPATIENT)
Dept: HOSPITAL 76 - ED | Age: 56
Setting detail: OBSERVATION
LOS: 1 days | Discharge: HOME | End: 2021-05-14
Attending: NURSE PRACTITIONER | Admitting: NURSE PRACTITIONER
Payer: COMMERCIAL

## 2021-05-13 DIAGNOSIS — Z87.01: ICD-10-CM

## 2021-05-13 DIAGNOSIS — Z20.822: ICD-10-CM

## 2021-05-13 DIAGNOSIS — E11.65: ICD-10-CM

## 2021-05-13 DIAGNOSIS — T38.0X5A: ICD-10-CM

## 2021-05-13 DIAGNOSIS — I16.1: ICD-10-CM

## 2021-05-13 DIAGNOSIS — I10: ICD-10-CM

## 2021-05-13 DIAGNOSIS — Z79.4: ICD-10-CM

## 2021-05-13 DIAGNOSIS — J81.1: Primary | ICD-10-CM

## 2021-05-13 DIAGNOSIS — E78.5: ICD-10-CM

## 2021-05-13 DIAGNOSIS — Z66: ICD-10-CM

## 2021-05-13 LAB
ALBUMIN DIAFP-MCNC: 4 G/DL (ref 3.2–5.5)
ALBUMIN/GLOB SERPL: 1 {RATIO} (ref 1–2.2)
ALP SERPL-CCNC: 100 IU/L (ref 42–121)
ALT SERPL W P-5'-P-CCNC: 31 IU/L (ref 10–60)
ANION GAP SERPL CALCULATED.4IONS-SCNC: 10 MMOL/L (ref 6–13)
AST SERPL W P-5'-P-CCNC: 31 IU/L (ref 10–42)
B PARAPERT DNA SPEC QL NAA+PROBE: NOT DETECTED
B PERT DNA SPEC QL NAA+PROBE: NOT DETECTED
BASOPHILS NFR BLD AUTO: 0 10^3/UL (ref 0–0.1)
BASOPHILS NFR BLD AUTO: 0.5 %
BILIRUB BLD-MCNC: 0.6 MG/DL (ref 0.2–1)
BUN SERPL-MCNC: 13 MG/DL (ref 6–20)
C PNEUM DNA NPH QL NAA+NON-PROBE: NOT DETECTED
CALCIUM UR-MCNC: 9.3 MG/DL (ref 8.5–10.3)
CHLORIDE SERPL-SCNC: 102 MMOL/L (ref 101–111)
CO2 SERPL-SCNC: 28 MMOL/L (ref 21–32)
CREAT SERPLBLD-SCNC: 0.7 MG/DL (ref 0.4–1)
EOSINOPHIL # BLD AUTO: 0.5 10^3/UL (ref 0–0.7)
EOSINOPHIL NFR BLD AUTO: 5.9 %
ERYTHROCYTE [DISTWIDTH] IN BLOOD BY AUTOMATED COUNT: 14.5 % (ref 12–15)
EST. AVERAGE GLUCOSE BLD GHB EST-MCNC: 278 MG/DL (ref 70–100)
FLUAV RNA RESP QL NAA+PROBE: NOT DETECTED
GFRSERPLBLD MDRD-ARVRAT: 105 ML/MIN/{1.73_M2} (ref 89–?)
GLOBULIN SER-MCNC: 4.2 G/DL (ref 2.1–4.2)
GLUCOSE SERPL-MCNC: 276 MG/DL (ref 70–100)
HAEM INFLU B DNA SPEC QL NAA+PROBE: NOT DETECTED
HBA1C MFR BLD HPLC: 11.3 % (ref 4.27–6.07)
HCOV 229E RNA SPEC QL NAA+PROBE: NOT DETECTED
HCOV HKU1 RNA UPPER RESP QL NAA+PROBE: NOT DETECTED
HCOV NL63 RNA ASPIRATE QL NAA+PROBE: NOT DETECTED
HCOV OC43 RNA SPEC QL NAA+PROBE: NOT DETECTED
HCT VFR BLD AUTO: 34 % (ref 37–47)
HGB UR QL STRIP: 10.8 G/DL (ref 12–16)
HMPV AG SPEC QL: NOT DETECTED
HPIV1 RNA NPH QL NAA+PROBE: NOT DETECTED
HPIV2 SPEC QL CULT: NOT DETECTED
HPIV3 AB TITR SER CF: NOT DETECTED {TITER}
HPIV4 RNA SPEC QL NAA+PROBE: NOT DETECTED
LIPASE SERPL-CCNC: 40 U/L (ref 22–51)
LYMPHOCYTES # SPEC AUTO: 2.6 10^3/UL (ref 1.5–3.5)
LYMPHOCYTES NFR BLD AUTO: 33.8 %
M PNEUMO DNA SPEC QL NAA+PROBE: NOT DETECTED
MCH RBC QN AUTO: 29.5 PG (ref 27–31)
MCHC RBC AUTO-ENTMCNC: 31.8 G/DL (ref 32–36)
MCV RBC AUTO: 92.9 FL (ref 81–99)
MONOCYTES # BLD AUTO: 0.6 10^3/UL (ref 0–1)
MONOCYTES NFR BLD AUTO: 7.1 %
NEUTROPHILS # BLD AUTO: 4.1 10^3/UL (ref 1.5–6.6)
NEUTROPHILS # SNV AUTO: 7.8 X10^3/UL (ref 4.8–10.8)
NEUTROPHILS NFR BLD AUTO: 52.4 %
NRBC # BLD AUTO: 0 /100WBC
NRBC # BLD AUTO: 0 X10^3/UL
PDW BLD AUTO: 12.8 FL (ref 7.9–10.8)
PLATELET # BLD: 101 10^3/UL (ref 130–450)
POTASSIUM SERPL-SCNC: 3.8 MMOL/L (ref 3.5–5)
PROT SPEC-MCNC: 8.2 G/DL (ref 6.7–8.2)
RBC MAR: 3.66 10^6/UL (ref 4.2–5.4)
RSV RNA RESP QL NAA+PROBE: NOT DETECTED
RV+EV RNA SPEC QL NAA+PROBE: NOT DETECTED
SARS-COV-2 RNA PNL SPEC NAA+PROBE: NOT DETECTED
SODIUM SERPLBLD-SCNC: 140 MMOL/L (ref 135–145)

## 2021-05-13 PROCEDURE — 96375 TX/PRO/DX INJ NEW DRUG ADDON: CPT

## 2021-05-13 PROCEDURE — 85379 FIBRIN DEGRADATION QUANT: CPT

## 2021-05-13 PROCEDURE — 83690 ASSAY OF LIPASE: CPT

## 2021-05-13 PROCEDURE — 96365 THER/PROPH/DIAG IV INF INIT: CPT

## 2021-05-13 PROCEDURE — 84484 ASSAY OF TROPONIN QUANT: CPT

## 2021-05-13 PROCEDURE — 83036 HEMOGLOBIN GLYCOSYLATED A1C: CPT

## 2021-05-13 PROCEDURE — 87640 STAPH A DNA AMP PROBE: CPT

## 2021-05-13 PROCEDURE — 96376 TX/PRO/DX INJ SAME DRUG ADON: CPT

## 2021-05-13 PROCEDURE — 81001 URINALYSIS AUTO W/SCOPE: CPT

## 2021-05-13 PROCEDURE — 36415 COLL VENOUS BLD VENIPUNCTURE: CPT

## 2021-05-13 PROCEDURE — 85730 THROMBOPLASTIN TIME PARTIAL: CPT

## 2021-05-13 PROCEDURE — 94640 AIRWAY INHALATION TREATMENT: CPT

## 2021-05-13 PROCEDURE — 80053 COMPREHEN METABOLIC PANEL: CPT

## 2021-05-13 PROCEDURE — 87086 URINE CULTURE/COLONY COUNT: CPT

## 2021-05-13 PROCEDURE — 0202U NFCT DS 22 TRGT SARS-COV-2: CPT

## 2021-05-13 PROCEDURE — 93005 ELECTROCARDIOGRAM TRACING: CPT

## 2021-05-13 PROCEDURE — 80048 BASIC METABOLIC PNL TOTAL CA: CPT

## 2021-05-13 PROCEDURE — 71045 X-RAY EXAM CHEST 1 VIEW: CPT

## 2021-05-13 PROCEDURE — 96372 THER/PROPH/DIAG INJ SC/IM: CPT

## 2021-05-13 PROCEDURE — 70450 CT HEAD/BRAIN W/O DYE: CPT

## 2021-05-13 PROCEDURE — 85025 COMPLETE CBC W/AUTO DIFF WBC: CPT

## 2021-05-13 PROCEDURE — 97161 PT EVAL LOW COMPLEX 20 MIN: CPT

## 2021-05-13 PROCEDURE — 99285 EMERGENCY DEPT VISIT HI MDM: CPT

## 2021-05-13 PROCEDURE — 83880 ASSAY OF NATRIURETIC PEPTIDE: CPT

## 2021-05-13 RX ADMIN — LOSARTAN POTASSIUM SCH MG: 50 TABLET, FILM COATED ORAL at 11:27

## 2021-05-13 RX ADMIN — SODIUM CHLORIDE, PRESERVATIVE FREE PRN ML: 5 INJECTION INTRAVENOUS at 21:33

## 2021-05-13 RX ADMIN — GABAPENTIN SCH MG: 400 CAPSULE ORAL at 21:21

## 2021-05-13 RX ADMIN — INSULIN ASPART SCH UNIT: 100 INJECTION, SOLUTION INTRAVENOUS; SUBCUTANEOUS at 11:28

## 2021-05-13 RX ADMIN — INSULIN ASPART SCH UNIT: 100 INJECTION, SOLUTION INTRAVENOUS; SUBCUTANEOUS at 16:52

## 2021-05-13 RX ADMIN — INSULIN ASPART SCH UNIT: 100 INJECTION, SOLUTION INTRAVENOUS; SUBCUTANEOUS at 21:21

## 2021-05-13 RX ADMIN — MORPHINE SULFATE PRN MG: 2 INJECTION, SOLUTION INTRAMUSCULAR; INTRAVENOUS at 21:32

## 2021-05-13 RX ADMIN — MORPHINE SULFATE PRN MG: 2 INJECTION, SOLUTION INTRAMUSCULAR; INTRAVENOUS at 19:01

## 2021-05-13 RX ADMIN — SODIUM CHLORIDE, PRESERVATIVE FREE SCH ML: 5 INJECTION INTRAVENOUS at 16:52

## 2021-05-13 RX ADMIN — GABAPENTIN SCH MG: 300 CAPSULE ORAL at 11:27

## 2021-05-13 RX ADMIN — SODIUM CHLORIDE, PRESERVATIVE FREE PRN ML: 5 INJECTION INTRAVENOUS at 19:00

## 2021-05-13 RX ADMIN — GLIMEPIRIDE SCH MG: 2 TABLET ORAL at 11:27

## 2021-05-13 NOTE — XRAY REPORT
PROCEDURE:  Chest 1 View X-Ray

 

INDICATIONS:  chest pain

 

TECHNIQUE:  One view of the chest was acquired.  

 

COMPARISON:  5/13/2021 chest rate at 0530 hours.

 

FINDINGS:  

 

Surgical changes and devices:  None.  

 

Lungs and pleura:  No pleural effusions or pneumothorax. Decreased, mild bilateral perihilar and righ
t greater than left bibasilar opacities.

 

Mediastinum:  Mediastinal contours appear normal.  Heart size is normal.  

 

Bones and chest wall:  No suspicious bony lesions.  Overlying soft tissues appear unremarkable.  

 

IMPRESSION:  

Resolving bilateral pulmonary opacities, suggestive of resolving edema.

 

Reviewed by: Fatou Faulkner MD on 5/13/2021 9:26 AM PDT

Approved by: Fatou Faulkner MD on 5/13/2021 9:26 AM PDT

 

 

Station ID:  [id number]

## 2021-05-13 NOTE — ED PHYSICIAN DOCUMENTATION
PD HPI DYSPNEA





- Stated complaint


Stated Complaint: SOA





- Chief complaint


Chief Complaint: Resp





- History obtained from


History obtained from: Patient, Family





- History of Present Illness


Timing - onset: Today


Timing - onset during: Rest


Timing - duration: Hours


Timing - details: Abrupt onset, Still present


Inciting event(s): URI


Improved by: O2, Inhaler/neb, Rest, Sitting up


Associated symptoms: Cough, Wheezing.  No: Fever, Bilateral edema, Unilateral 

edema


Similar symptoms before: Diagnosis (pneumonia)


Recently seen: Admitted





- Additional information


Additional information: 





56-year-old female with a history of type 2 diabetes has recently been admitted 

into the hospital for uncontrolled diabetes with a blood sugar of 710 in April 

of this year.  She took 5 days in the hospital to clear and while she was here 

in the hospital she developed a pneumonia which was subsequently treated as 

MRSA. She subsequently has come back to the hospital with a syncopal episode was

admitted again and treated for diabetes. This morning she developed acute 

shortness of breath with wheezing and has come to the ED with wheezing. 





Review of Systems


Constitutional: denies: Fever


Eyes: denies: Decreased vision


Ears: denies: Ear pain


Nose: denies: Congestion


Throat: denies: Sore throat


Cardiac: denies: Chest pain / pressure, Palpitations, Pedal edema, Calf pain


Respiratory: reports: Dyspnea, Cough, Wheezing


GI: denies: Abdominal Pain, Nausea, Vomiting


: denies: Dysuria, Frequency





PD PAST MEDICAL HISTORY





- Past Medical History


Past Medical History: Yes


Cardiovascular: Hypertension, High cholesterol, Other


Respiratory: None


Neuro: None


Endocrine/Autoimmune: Type 2 diabetes


GI: None


GYN: Other


: None


HEENT: Other


Psych: None


Musculoskeletal: None


Derm: None





- Past Surgical History


Past Surgical History: Yes


General: Other





- Present Medications


Home Medications: 


                                Ambulatory Orders











 Medication  Instructions  Recorded  Confirmed


 


Glimepiride [Amaryl] 2 mg PO DAILYWM #30 tablet 03/26/18 05/13/21


 


Gabapentin 1,200 mg PO BID 06/27/18 05/13/21


 


Simvastatin 40 mg PO QPM 06/27/18 05/13/21


 


Gabapentin [Neurontin] 600 mg PO 1200 04/21/21 05/13/21


 


Pioglitazone HCl [Actos] 30 mg PO DAILY 04/21/21 05/13/21


 


Ferric Citrate [Auryxia] 210 mg PO DAILY #30 tablet 04/25/21 05/13/21


 


Losartan [Cozaar] 50 mg PO DAILY #30 tablet 04/25/21 05/13/21


 


Blood Sugar Diagnostic [Glucometer 1 each  QID #200 strip 05/07/21 05/13/21





Strips]   


 


Blood-Glucose Meter [Glucometer] 1 each  QID #1 each 05/07/21 05/13/21


 


Insulin Aspart [NovoLOG] 2 - 10 unit SUBQ 05/07/21 05/13/21





 0800,1200,1700,2100 #10  


 


Insulin Glargine [Lantus Solostar] 10 unit SUBQ QPM #5 05/07/21 05/13/21


 


Lancets 1 each  QID #200 each 05/07/21 05/13/21














- Allergies


Allergies/Adverse Reactions: 


                                    Allergies











Allergy/AdvReac Type Severity Reaction Status Date / Time


 


No Known Drug Allergies Allergy   Verified 05/13/21 05:52














- Social History


Does the pt smoke?: No


Smoking Status: Never smoker


Does the pt drink ETOH?: No


Does the pt have substance abuse?: No





- Immunizations


Immunizations are current?: Yes





- POLST


Patient has POLST: No


POLST Status: Full Code





PD ED PE NORMAL





- Vitals


Vital signs reviewed: Yes (Hypertensive marked)





- General


General: Alert and oriented X 3, Well developed/nourished, Other (Anxious with 

audible wheeze and respiratory distress.)





- HEENT


HEENT: Atraumatic, PERRL, EOMI





- Neck


Neck: Supple, no meningeal sign, No bony TTP, Other (There is jugular venous 

distention present. )





- Cardiac


Cardiac: RRR, No murmur





- Respiratory


Respiratory: Other (Respiratory distress is present with tachypnea audible 

wheezing and bibasilar rhonchi.)





- Abdomen


Abdomen: Soft, Non tender





- Back


Back: No CVA TTP, No spinal TTP





- Derm


Derm: Normal color, Warm and dry, No rash





- Extremities


Extremities: No deformity, No edema





- Neuro


Neuro: Alert and oriented X 3, CNs 2-12 intact, No motor deficit, No sensory 

deficit, Normal speech


Eye Opening: Spontaneous


Motor: Obeys Commands


Verbal: Oriented


GCS Score: 15





- Psych


Psych: Normal mood, Normal affect





Results





- Vitals


Vitals: 


                               Vital Signs - 24 hr











  05/13/21 05/13/21 05/13/21





  05:35 05:45 05:52


 


Temperature 37.4 C  


 


Heart Rate 88 82 87


 


Respiratory 16 27 H 28 H





Rate   


 


Blood Pressure 204/101 H 196/92 H 


 


O2 Saturation 96 96 














  05/13/21 05/13/21 05/13/21





  06:13 06:33 07:13


 


Temperature 35.9 C L 36.5 C 


 


Heart Rate 90 88 87


 


Respiratory 26 H 29 H 28 H





Rate   


 


Blood Pressure 185/84 H 195/100 H 188/96 H


 


O2 Saturation 97 94 94














  05/13/21 05/13/21 05/13/21





  07:30 08:00 08:30


 


Temperature  36.5 C 


 


Heart Rate 78 84 80


 


Respiratory 16 16 16





Rate   


 


Blood Pressure 175/86 H 192/94 H 203/91 H


 


O2 Saturation 96 97 96














  05/13/21 05/13/21 05/13/21





  08:37 09:00 09:30


 


Temperature   36.8 C


 


Heart Rate  79 81


 


Respiratory  16 16





Rate   


 


Blood Pressure 177/93 H 188/90 H 182/82 H


 


O2 Saturation  97 97














  05/13/21





  10:00


 


Temperature 36.8 C


 


Heart Rate 84


 


Respiratory 16





Rate 


 


Blood Pressure 174/114 H


 


O2 Saturation 95








                                     Oxygen











O2 Source                      Room air

















- Labs


Labs: 


                                Laboratory Tests











  05/13/21 05/13/21 05/13/21





  05:42 05:42 05:42


 


WBC  7.8  


 


RBC  3.66 L  


 


Hgb  10.8 L  


 


Hct  34.0 L  


 


MCV  92.9  


 


MCH  29.5  


 


MCHC  31.8 L  


 


RDW  14.5  


 


Plt Count  101 L  


 


MPV  12.8 H  


 


Neut # (Auto)  4.1  


 


Lymph # (Auto)  2.6  


 


Mono # (Auto)  0.6  


 


Eos # (Auto)  0.5  


 


Baso # (Auto)  0.0  


 


Absolute Nucleated RBC  0.00  


 


Nucleated RBC %  0.0  


 


APTT   31.1 


 


D-Dimer   


 


Sodium    140


 


Potassium    3.8


 


Chloride    102


 


Carbon Dioxide    28


 


Anion Gap    10.0


 


BUN    13


 


Creatinine    0.7


 


Estimated GFR (MDRD)    105


 


Glucose    276 H


 


Estimat Average Glucose   


 


Hemoglobin A1c %   


 


Calcium    9.3


 


Total Bilirubin    0.6


 


AST    31


 


ALT    31


 


Alkaline Phosphatase    100


 


Troponin I High Sens   


 


B-Natriuretic Peptide   


 


Total Protein    8.2


 


Albumin    4.0


 


Globulin    4.2


 


Albumin/Globulin Ratio    1.0


 


Lipase    40














  05/13/21 05/13/21 05/13/21





  05:42 05:42 05:42


 


WBC   


 


RBC   


 


Hgb   


 


Hct   


 


MCV   


 


MCH   


 


MCHC   


 


RDW   


 


Plt Count   


 


MPV   


 


Neut # (Auto)   


 


Lymph # (Auto)   


 


Mono # (Auto)   


 


Eos # (Auto)   


 


Baso # (Auto)   


 


Absolute Nucleated RBC   


 


Nucleated RBC %   


 


APTT   


 


D-Dimer    < 200.0 L


 


Sodium   


 


Potassium   


 


Chloride   


 


Carbon Dioxide   


 


Anion Gap   


 


BUN   


 


Creatinine   


 


Estimated GFR (MDRD)   


 


Glucose   


 


Estimat Average Glucose   


 


Hemoglobin A1c %   


 


Calcium   


 


Total Bilirubin   


 


AST   


 


ALT   


 


Alkaline Phosphatase   


 


Troponin I High Sens  3.6  


 


B-Natriuretic Peptide   99 


 


Total Protein   


 


Albumin   


 


Globulin   


 


Albumin/Globulin Ratio   


 


Lipase   














  05/13/21





  05:42


 


WBC 


 


RBC 


 


Hgb 


 


Hct 


 


MCV 


 


MCH 


 


MCHC 


 


RDW 


 


Plt Count 


 


MPV 


 


Neut # (Auto) 


 


Lymph # (Auto) 


 


Mono # (Auto) 


 


Eos # (Auto) 


 


Baso # (Auto) 


 


Absolute Nucleated RBC 


 


Nucleated RBC % 


 


APTT 


 


D-Dimer 


 


Sodium 


 


Potassium 


 


Chloride 


 


Carbon Dioxide 


 


Anion Gap 


 


BUN 


 


Creatinine 


 


Estimated GFR (MDRD) 


 


Glucose 


 


Estimat Average Glucose  278 H


 


Hemoglobin A1c %  11.3 H


 


Calcium 


 


Total Bilirubin 


 


AST 


 


ALT 


 


Alkaline Phosphatase 


 


Troponin I High Sens 


 


B-Natriuretic Peptide 


 


Total Protein 


 


Albumin 


 


Globulin 


 


Albumin/Globulin Ratio 


 


Lipase 














- Rads (name of study)


  ** Chest x-ray


Radiology: Prelim report reviewed (Impression: 1.  Right lower lung zone 

airspace process.  Consider pneumonia.), EMP read indepedently, See rad report





Procedures





- IVC sono (time)


  ** 0624


Bedside IVC sono: IVC measures (cm) (2.45), IVC collapsed c insp (cm) (1.88), 

Collapsibility index (0.23), High CVP





  ** 0818


Bedside IVC sono: IVC measures (cm) (1.81)





PD MEDICAL DECISION MAKING





- ED course


Complexity details: reviewed old records, reviewed results, re-evaluated 

patient, considered differential, d/w patient, d/w family


ED course: 





56-year-old female who comes into the emergency department with a recent 

admission for uncontrolled diabetes with a blood sugar over 710 and a subsequent

admission for syncope again with elevated blood sugar and dehydration.  The 

patient on her initial admission stayed in the hospital for 5 days and had 

pneumonia on her exam as well.  She was treated for pneumonia and returned to 

her home had a syncopal episode return to the hospital was admitted for 3 days 

and has returned.  She has not returned to work and she is scheduled to return 

to work in 3 days.  This morning she has developed acute shortness of breath and

cough she is unable to lay flat and on presentation to the emergency department 

she is tachypneic and wheezing.  She has mild improvement with use of a DuoNeb 

treatment chest x-ray shows what appears to be an infiltrate in the right base 

and the patient appears to be struggling for for air.The patient's care was 

turned over to me by Dr. Hudson this morning at shift change and on my initial 

evaluation the patient was in distress and evaluation of the inferior vena cava 

indicated the patient was in failure.  This is directly opposed to what she has 

had previously with volume collapse.  She was administered intravenous Lasix and

promptly improved.  On reevaluation her IVC has changed from 2.45 cm to 1.81 cm.

 She is breathing well has diminished rhonchi but continues to have bibasilar 

rhonchi.  Her BNP and troponin were both negative.  I am uncertain why the 

patient developed acute failure (had elevated blood pressure?) but she continues

to have pneumonia and admission to observation is sought. Dr. Luna is 

consulted at 0850 and recommends ambulation and repeat CXR as there does not 

seem to be anything to admit the patient to the hospital for. 





Departure





- Departure


Disposition: ED Place in Observation


Clinical Impression: 


HTN (hypertension)


Qualifiers:


 Hypertension type: essential hypertension Qualified Code(s): I10 - Essential 

(primary) hypertension





Pneumonia


Qualifiers:


 Pneumonia type: due to unspecified organism Laterality: right Lung location: 

lower lobe of lung Qualified Code(s): J18.9 - Pneumonia, unspecified organism





Congestive heart failure


Qualifiers:


 Heart failure type: unspecified Heart failure chronicity: acute Qualified 

Code(s): I50.9 - Heart failure, unspecified





Discharge Date/Time: 05/13/21 10:58

## 2021-05-13 NOTE — HISTORY & PHYSICAL EXAMINATION
Chief Complaint





- Chief Complaint


Chief Complaint: dyspnea





History of Present Illness





- Admitted From


Admitted From:: ER





- History Obtained From


Records Reviewed: [facility]


History obtained from: Pt


Exam Limitations: no





- History of Present Illness


HPI Comment/Other: 





This is a 56-year-old  female with a history of uncontrolled 

Diabetes on metformin, actos and glimiperide, HTN and hyperlipidemia, who 

present ER complain of dyspnea. pt report she Woke up at 3 am today morning, she

felt shortness of breath, with cough and wheezing. pt report she has never 

smoking. she was not diagnosis of asthma and COPD before. Pt report she did feel

better after she was treated with Lasix and Duoneb. pt had two CXR which first 

show mild atypical pneumonia, second CXR reveals resolving bilateral pulmonary 

edema, suggestive of resolving edema. In the exam, pt has no cough or wheezing 

now, she denies chest pain, fever or chill. pt has normal arrange of WBC and pt 

has no fever. Pt had ECHO done about one month ago which show unremarkable with 

normal EF and normal right pressure. Lab test show pt has normal arrange WBC, 

normal arrange of troponin, BNP and D-dimer <200. In ER, patient Is afebrile, 

but has elevated blood pressure with tachypnea at RR 27 and has 96% on room air.

Medical team was consulted for management her dysnpea in observation unit. 


Discussed the care goal with patient, patient clearly state DNR/DNI. 








History





- Past Medical History


Cardiovascular: reports: Hypertension, High cholesterol, Other


Respiratory: reports: None


Neuro: reports: None


Endocrine/Autoimmune: reports: Type 2 diabetes


GI: reports: None


GYN: reports: Other


: reports: None


HEENT: reports: Other


Psych: reports: None


Musculoskeletal: reports: None


Derm: reports: None


MRSA Hx?: No





- Past Surgical History


General: reports: Other





- Family & Social History


Family History: Mother: , Father: 


Family History Comment/Other: Dad and Mom both  in their late 60's. They had

DM,HTN, chol, CAD at their deaths. She had 18 siblings. 3 sisters have  of 

cancer; 1 was breast but the other two she doesn't know what kind of cancer. 1 

brother  of GSW. 1 brother  of MI. Her 3 children are healthy


Living Situation: With spouse/s.o.


Social History Notes: she was born in [facility] and has lived all over the 

[facility] bc of work and family. She has been  to her 2nd  for 17 

years. They met at her cousins house one night for dinner on a blind date and 

have been together even since. Her 3 children live in [place] [facility] [place]. She never 

smoked, and never really even socially drank since she doesn't like the taste of

it. 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 

different locations. [place] time went on and now plan on being

in the [place]





- Substance History


Use: Uses substance without health or social issues: NONE





- POLST


Patient has POLST: No


POLST Status: Full Code





Meds/Allgy





- Home Medications


Home Medications: 


                                Ambulatory Orders











 Medication  Instructions  Recorded  Confirmed


 


Glimepiride [Amaryl] 2 mg PO DAILYWM #30 tablet 18


 


Gabapentin 1,200 mg PO BID 18


 


Simvastatin 40 mg PO QPM 18


 


Gabapentin [Neurontin] 600 mg PO 1200 21


 


Pioglitazone HCl [Actos] 30 mg PO DAILY 21


 


Ferric Citrate [Auryxia] 210 mg PO DAILY #30 tablet 21


 


Losartan [Cozaar] 50 mg PO DAILY #30 tablet 21


 


Blood Sugar Diagnostic [Glucometer 1 each  QID #200 strip 21





Strips]   


 


Blood-Glucose Meter [Glucometer] 1 each  QID #1 each 21


 


Insulin Aspart [NovoLOG] 2 - 10 unit SUBQ 21





 0800,1200,1700,2100 #10  


 


Insulin Glargine [Lantus Solostar] 10 unit SUBQ QPM #5 21


 


Lancets 1 each  QID #200 each 21














- Allergies


Allergies/Adverse Reactions: 


                                    Allergies











Allergy/AdvReac Type Severity Reaction Status Date / Time


 


No Known Drug Allergies Allergy   Verified 05/13/21 05:52














Review of Systems





- Constitutional


Constitutional: denies: Fever, Chills, Poor appetite, Diaphoresis





- Eyes


Eyes: denies: Pain, Blurred vision, Field loss, Vision loss





- Ears, Nose & Throat


Ears, Nose & Throat: denies: Ear pain, Hearing aids, Nosebleeds





- Cardiovascular


Cariovascular: reports: Exertional dyspnea, Decr. exercise tolerance.  denies: 

Irregular heart rate, Palpitations, Chest pain, Edema, Lightheadedness, Syncope





- Respiratory


Respiratory: reports: Cough, Wheezing, SOB at rest, SOB with exertion.  denies: 

Sputum production, Snoring, Hemoptysis, Orthopnea





- Gastrointestinal


Gastrointestinal: denies: Abdominal pain, Constipation, Diarrhea, Rectal 

bleeding, Black stools, Bloody stools, Nausea, Vomiting





- Genitourinary


Genitourinary: denies: Dysuria, Urgency, Incontinence





- Musculoskeletal


Musculoskeletal: denies: Muscle pain, Muscle aches, Limited range of motion





- Integumentary


Integumentary: denies: Rash, Lesions, Lumps





- Neurological


Neurological: denies: General weakness, Focal weakness, Headache, Dizziness, 

Numbness, Pre-existing deficit, Abnormal gait, Seizures, Incoordination, Slurred

speech





- Psychiatric


Psychiatric: denies: Depression, Suicidal, Delusions





- Endocrine


Endocrine: denies: Polyuria, Polyphagia





- Hematologic/Lymphatic


Hematologic/Lymphatic: denies: Anemia, Blood clots


Prior Level of Functionality: 


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








Exam





- Vital Signs


Vital Signs: 





                                Vital Signs x48h











  Temp Pulse Resp BP Pulse Ox


 


 21 10:00  36.8 C  84  16  174/114 H  95


 


 21 09:30  36.8 C  81  16  182/82 H  97


 


 21 09:00   79  16  188/90 H  97


 


 21 08:37     177/93 H 


 


 21 08:30   80  16  203/91 H  96


 


 21 08:00  36.5 C  84  16  192/94 H  97


 


 21 07:30   78  16  175/86 H  96


 


 21 07:13   87  28 H  188/96 H  94


 


 21 06:33  36.5 C  88  29 H  195/100 H  94


 


 21 06:13  35.9 C L  90  26 H  185/84 H  97


 


 21 05:52   87  28 H  


 


 21 05:45   82  27 H  196/92 H  96


 


 21 05:35  37.4 C  88  16  204/101 H  96














- Physical Exam


General Appearance: positive: No acute distress, Alert.  negative: Lethargic


Eyes Bilateral: positive: Normal inspection, PERRL, No lid inflammation


ENT: positive: ENT inspection nml, No signs of dehydration.  negative: Purulent 

nasal drainage


Neck: positive: Nml inspection, Trachea midline.  negative: Thyromegaly, 

Tracheal deviation


Respiratory: positive: Chest non-tender, No respiratory distress.  negative: 

Wheezes, Rales


Cardiovascular: positive: Regular rate & rhythm, No murmur.  negative: 

Tachycardia, Bradycardia, Systolic murmur, Diastolic murmur


Peripheral Pulses: positive: 2+


Abdomen: positive: Non-tender, Nml bowel sounds, No distention.  negative: 

Tenderness


Back: positive: Nml inspection


Skin: positive: Color nml, Warm, Dry.  negative: Cyanosis


Extremities: positive: Non-tender, Full ROM, Nml appearance.  negative: Calf 

tenderness


Neurologic/Psychiatric: positive: Oriented x3, Motor nml, Sensation nml, 

Mood/affect nml.  negative: Weakness, Sensory loss, Facial droop, Slurred/abnml 

speech, Depressed mood/affect





Conclusion/Plan





- Problem List


(1) Dyspnea


Conclusion/Plan: 


Patient present dyspnea with RR 27 at ER. After the patient was given Lasix and 

DuoNeb, Patient did feel significantly improved from her SOB. D-dimer is less 

than 200. Repeated chest x-ray show patient had resolving pulmonary edema. BNP 

is in the normal range. Patient had recent echo which show normal EF and normal 

right heart pressure.Pt has normal arrange WBC, pt has no fever and did not 

present pneumonia symptoms. pt's dyspnea is likely caused by pulmonary edema. We

will continue intravenous Lasix, continue breath treat with albuterol and Duoneb

as needed, and vital and lab monitor








(2) Hypertensive emergency


Conclusion/Plan: 


pt's SBP was elevated to over 200 then around 180. pt denies chest pain or 

headche. will resume her home meds Losartan, add Norvasc, and PRN IV 

hydralazine. continue vital monitor








(3) Uncontrolled diabetes mellitus


Conclusion/Plan: 


pt has hx of uncontrolled and just started to have insulin on last d/c. today pt

still has elevated glucose level, will add Lantus to 15 unites and slide scale. 








(4) HLD (hyperlipidemia)


Conclusion/Plan: 


will resume home Lipitor








- Lab Results


Fish Bones: 


                                 21 05:42





                                 21 05:42





Core Measures





- Anticipated LOS


I expect patient to be DC'd or transferred within 96 hours.: Yes





- DVT/VTE - Prophylaxis


VTE/DVT Device ordered at admit?: Yes


VTE/DVT Prophylaxis med ordered at admit?: Yes

## 2021-05-13 NOTE — ED PHYSICIAN DOCUMENTATION
PD HPI DYSPNEA





- Stated complaint


Stated Complaint: SOA





- Chief complaint


Chief Complaint: Resp





- History obtained from


History obtained from: Patient, Family





- Additional information


Additional information: 


Patient comes emergency department chief complaint of shortness of breath for 

the last 3 hours. She is not really sure what brought it on, although her 

 states that she has recently had "fluid on her lungs" which was 

diagnosed here. Patient was actually seen here on April 21 for elevated blood 

sugar and was admitted for the same. At that point in time, she was found to 

have what appeared to be some small areas of pneumonia on chest x-ray and was 

treated for this. Patient then returned after a weeklong admission and discharge

on May 5, due to a syncopal episode. At that point in time, patient had no 

respiratory complaints. The patient denies any history of asthma or COPD. No 

known cardiac issues. She has not been coughing or febrile recently. Patient 

states that she has not had any swelling down in her legs. No calf pain. Patient

denies any other complaints at this time.








Review of Systems


Ten Systems: 10 systems reviewed and negative


Constitutional: reports: Reviewed and negative


Eyes: reports: Reviewed and negative


Ears: reports: Reviewed and negative


Nose: reports: Reviewed and negative


Throat: reports: Reviewed and negative


Cardiac: reports: Reviewed and negative


Respiratory: reports: Dyspnea, Wheezing


GI: reports: Reviewed and negative


: reports: Reviewed and negative


Skin: reports: Reviewed and negative


Musculoskeletal: reports: Reviewed and negative.  denies: Extremity swelling


Neurologic: reports: Reviewed and negative


Psychiatric: reports: Reviewed and negative


Endocrine: reports: Reviewed and negative


Immunocompromised: reports: Reviewed and negative





PD PAST MEDICAL HISTORY





- Past Medical History


Past Medical History: Yes


Cardiovascular: Hypertension, High cholesterol, Other


Respiratory: None


Neuro: None


Endocrine/Autoimmune: Type 2 diabetes


GI: None


GYN: Other


: None


HEENT: Other


Psych: None


Musculoskeletal: None


Derm: None





- Past Surgical History


Past Surgical History: Yes


General: Other





- Present Medications


Home Medications: 


                                Ambulatory Orders











 Medication  Instructions  Recorded  Confirmed


 


Glimepiride [Amaryl] 2 mg PO DAILYWM #30 tablet 03/26/18 05/13/21


 


Gabapentin 1,200 mg PO BID 06/27/18 05/13/21


 


Simvastatin 40 mg PO QPM 06/27/18 05/13/21


 


Gabapentin [Neurontin] 600 mg PO 1200 04/21/21 05/13/21


 


Pioglitazone HCl [Actos] 30 mg PO DAILY 04/21/21 05/13/21


 


Ferric Citrate [Auryxia] 210 mg PO DAILY #30 tablet 04/25/21 05/13/21


 


Losartan [Cozaar] 50 mg PO DAILY #30 tablet 04/25/21 05/13/21


 


Blood Sugar Diagnostic [Glucometer 1 each  QID #200 strip 05/07/21 05/13/21





Strips]   


 


Blood-Glucose Meter [Glucometer] 1 each  QID #1 each 05/07/21 05/13/21


 


Insulin Aspart [NovoLOG] 2 - 10 unit SUBQ 05/07/21 05/13/21





 0800,1200,1700,2100 #10  


 


Insulin Glargine [Lantus Solostar] 10 unit SUBQ QPM #5 05/07/21 05/13/21


 


Lancets 1 each  QID #200 each 05/07/21 05/13/21














- Allergies


Allergies/Adverse Reactions: 


                                    Allergies











Allergy/AdvReac Type Severity Reaction Status Date / Time


 


No Known Drug Allergies Allergy   Verified 05/13/21 05:52














- Social History


Does the pt smoke?: No


Smoking Status: Never smoker


Does the pt drink ETOH?: No


Does the pt have substance abuse?: No





- Immunizations


Immunizations are current?: Yes





- POLST


Patient has POLST: No


POLST Status: Full Code





PD ED PE NORMAL





- Vitals


Vital signs reviewed: Yes





- General


General: Alert and oriented X 3, Well developed/nourished, Other (Moderate 

respiratory distress.)





- HEENT


HEENT: Atraumatic, PERRL, EOMI, Moist mucous membranes





- Neck


Neck: Supple, no meningeal sign





- Cardiac


Cardiac: RRR, No murmur, Strong equal pulses





- Respiratory


Respiratory: Other (Bilateral wheezes/rhonchi, moderately decreased air 

movement, mild crackles throughout lower lung fields bilaterally. Increased work

of breathing, the patient can speak a few words at a time.)





- Abdomen


Abdomen: Soft, Non tender, Non distended





- Derm


Derm: Normal color, Warm and dry, No rash





- Extremities


Extremities: No deformity, No edema, No calf tenderness / cord





- Neuro


Neuro: Other (Patient is awake and answers questions appropriately, but lays in 

bed with eyes closed. Remainder of neuro exam grossly intact.)





- Psych


Psych: Normal mood, Normal affect





Results





- Vitals


Vitals: 





                               Vital Signs - 24 hr











  05/13/21 05/13/21 05/13/21





  05:35 05:45 05:52


 


Temperature 37.4 C  


 


Heart Rate 88 82 87


 


Respiratory 16 27 H 28 H





Rate   


 


Blood Pressure 204/101 H 196/92 H 


 


O2 Saturation 96 96 








                                     Oxygen











O2 Source                      Room air

















- EKG (time done)


  ** 0557


Rate: Rate (enter#) (79)


Rhythm: NSR


Axis: Normal


Intervals: Normal CA


QRS: Low voltage


Ischemia: ST elevation c/w repol


Compare to prior EKG: Old EKG unavailable


Computer interpretation: Agree with computer





- Labs


Labs: 





                                Laboratory Tests











  05/13/21 05/13/21 05/13/21





  05:42 05:42 05:42


 


WBC  7.8  


 


RBC  3.66 L  


 


Hgb  10.8 L  


 


Hct  34.0 L  


 


MCV  92.9  


 


MCH  29.5  


 


MCHC  31.8 L  


 


RDW  14.5  


 


Plt Count  101 L  


 


MPV  12.8 H  


 


Neut # (Auto)  4.1  


 


Lymph # (Auto)  2.6  


 


Mono # (Auto)  0.6  


 


Eos # (Auto)  0.5  


 


Baso # (Auto)  0.0  


 


Absolute Nucleated RBC  0.00  


 


Nucleated RBC %  0.0  


 


APTT   31.1 


 


Sodium    140


 


Potassium    3.8


 


Chloride    102


 


Carbon Dioxide    28


 


Anion Gap    10.0


 


BUN    13


 


Creatinine    0.7


 


Estimated GFR (MDRD)    105


 


Glucose    276 H


 


Calcium    9.3


 


Total Bilirubin    0.6


 


AST    31


 


ALT    31


 


Alkaline Phosphatase    100


 


Troponin I High Sens   


 


B-Natriuretic Peptide   


 


Total Protein    8.2


 


Albumin    4.0


 


Globulin    4.2


 


Albumin/Globulin Ratio    1.0


 


Lipase    40














  05/13/21 05/13/21





  05:42 05:42


 


WBC  


 


RBC  


 


Hgb  


 


Hct  


 


MCV  


 


MCH  


 


MCHC  


 


RDW  


 


Plt Count  


 


MPV  


 


Neut # (Auto)  


 


Lymph # (Auto)  


 


Mono # (Auto)  


 


Eos # (Auto)  


 


Baso # (Auto)  


 


Absolute Nucleated RBC  


 


Nucleated RBC %  


 


APTT  


 


Sodium  


 


Potassium  


 


Chloride  


 


Carbon Dioxide  


 


Anion Gap  


 


BUN  


 


Creatinine  


 


Estimated GFR (MDRD)  


 


Glucose  


 


Calcium  


 


Total Bilirubin  


 


AST  


 


ALT  


 


Alkaline Phosphatase  


 


Troponin I High Sens  3.6 


 


B-Natriuretic Peptide   99


 


Total Protein  


 


Albumin  


 


Globulin  


 


Albumin/Globulin Ratio  


 


Lipase  














PD MEDICAL DECISION MAKING





- ED course


Complexity details: reviewed results, re-evaluated patient, considered d

ifferential, d/w patient


ED course: 


The patient was worked up with labs, EKG, and chest x-ray, and was treated 

symptomatically initially with a DuoNeb and Decadron.  Other than a moderately 

elevated blood sugar, remainder of labs were unremarkable, including a normal 

BNP and troponin.  Patient reported feeling a little bit better after the 

DuoNeb. She was signed out to Dr. Calhoun, pending remainder of symptomatic 

treatment and final disposition.

## 2021-05-13 NOTE — XRAY REPORT
PROCEDURE:  Chest 1 View X-Ray

 

INDICATIONS:  Chest Pain

 

TECHNIQUE:  One view of the chest was acquired.  

 

COMPARISON:  5/5/2021 chest x-ray

 

FINDINGS:  

 

Surgical changes and devices:  None.  

 

Lungs and pleura:  No pleural effusions or pneumothorax. Mild diffuse bilateral perihilar and right g
reater than left bibasilar airspace opacities.

 

Mediastinum:  Mediastinal contours appear normal.  Heart size is normal.  

 

Bones and chest wall:  No suspicious bony lesions.  Overlying soft tissues appear unremarkable.  

 

IMPRESSION:  

Mild atypical pneumonia. Concordant with preliminary interpretation.

 

Reviewed by: Fatou Faulkner MD on 5/13/2021 9:09 AM PDT

Approved by: Fatou Faulkner MD on 5/13/2021 9:09 AM PDT

 

 

Station ID:  [id number]

## 2021-05-14 VITALS — DIASTOLIC BLOOD PRESSURE: 60 MMHG | SYSTOLIC BLOOD PRESSURE: 112 MMHG

## 2021-05-14 LAB
ANION GAP SERPL CALCULATED.4IONS-SCNC: 11 MMOL/L (ref 6–13)
BASOPHILS NFR BLD AUTO: 0 10^3/UL (ref 0–0.1)
BASOPHILS NFR BLD AUTO: 0.1 %
BUN SERPL-MCNC: 22 MG/DL (ref 6–20)
CALCIUM UR-MCNC: 9.2 MG/DL (ref 8.5–10.3)
CHLORIDE SERPL-SCNC: 96 MMOL/L (ref 101–111)
CLARITY UR REFRACT.AUTO: CLEAR
CO2 SERPL-SCNC: 30 MMOL/L (ref 21–32)
CREAT SERPLBLD-SCNC: 0.9 MG/DL (ref 0.4–1)
EOSINOPHIL # BLD AUTO: 0 10^3/UL (ref 0–0.7)
EOSINOPHIL NFR BLD AUTO: 0 %
ERYTHROCYTE [DISTWIDTH] IN BLOOD BY AUTOMATED COUNT: 14.3 % (ref 12–15)
GFRSERPLBLD MDRD-ARVRAT: 78 ML/MIN/{1.73_M2} (ref 89–?)
GLUCOSE SERPL-MCNC: 348 MG/DL (ref 70–100)
GLUCOSE UR QL STRIP.AUTO: >=1000 MG/DL
HCT VFR BLD AUTO: 34.1 % (ref 37–47)
HGB UR QL STRIP: 10.9 G/DL (ref 12–16)
KETONES UR QL STRIP.AUTO: NEGATIVE MG/DL
LYMPHOCYTES # SPEC AUTO: 1.4 10^3/UL (ref 1.5–3.5)
LYMPHOCYTES NFR BLD AUTO: 9.2 %
MCH RBC QN AUTO: 29.3 PG (ref 27–31)
MCHC RBC AUTO-ENTMCNC: 32 G/DL (ref 32–36)
MCV RBC AUTO: 91.7 FL (ref 81–99)
MONOCYTES # BLD AUTO: 0.7 10^3/UL (ref 0–1)
MONOCYTES NFR BLD AUTO: 5 %
NEUTROPHILS # BLD AUTO: 12.6 10^3/UL (ref 1.5–6.6)
NEUTROPHILS # SNV AUTO: 14.8 X10^3/UL (ref 4.8–10.8)
NEUTROPHILS NFR BLD AUTO: 85.3 %
NITRITE UR QL STRIP.AUTO: NEGATIVE
NRBC # BLD AUTO: 0 /100WBC
NRBC # BLD AUTO: 0 X10^3/UL
PDW BLD AUTO: 10.6 FL (ref 7.9–10.8)
PH UR STRIP.AUTO: 6 PH (ref 5–7.5)
PLATELET # BLD: 213 10^3/UL (ref 130–450)
POTASSIUM SERPL-SCNC: 3.7 MMOL/L (ref 3.5–5)
PROT UR STRIP.AUTO-MCNC: NEGATIVE MG/DL
RBC # UR STRIP.AUTO: NEGATIVE /UL
RBC # URNS HPF: (no result) /HPF (ref 0–5)
RBC MAR: 3.72 10^6/UL (ref 4.2–5.4)
SODIUM SERPLBLD-SCNC: 137 MMOL/L (ref 135–145)
SP GR UR STRIP.AUTO: 1.02 (ref 1–1.03)
SQUAMOUS URNS QL MICRO: (no result)
UROBILINOGEN UR QL STRIP.AUTO: (no result) E.U./DL
UROBILINOGEN UR STRIP.AUTO-MCNC: NEGATIVE MG/DL
WBC # UR MANUAL: (no result) /HPF (ref 0–5)

## 2021-05-14 RX ADMIN — LOSARTAN POTASSIUM SCH MG: 50 TABLET, FILM COATED ORAL at 10:09

## 2021-05-14 RX ADMIN — SODIUM CHLORIDE, PRESERVATIVE FREE PRN ML: 5 INJECTION INTRAVENOUS at 10:26

## 2021-05-14 RX ADMIN — GLIMEPIRIDE SCH MG: 2 TABLET ORAL at 10:09

## 2021-05-14 RX ADMIN — GABAPENTIN SCH MG: 400 CAPSULE ORAL at 10:07

## 2021-05-14 RX ADMIN — SODIUM CHLORIDE, PRESERVATIVE FREE SCH ML: 5 INJECTION INTRAVENOUS at 10:16

## 2021-05-14 RX ADMIN — SODIUM CHLORIDE, PRESERVATIVE FREE SCH ML: 5 INJECTION INTRAVENOUS at 05:13

## 2021-05-14 RX ADMIN — INSULIN ASPART SCH UNIT: 100 INJECTION, SOLUTION INTRAVENOUS; SUBCUTANEOUS at 10:12

## 2021-05-14 RX ADMIN — GABAPENTIN SCH MG: 300 CAPSULE ORAL at 12:11

## 2021-05-14 RX ADMIN — MORPHINE SULFATE PRN MG: 2 INJECTION, SOLUTION INTRAMUSCULAR; INTRAVENOUS at 10:26

## 2021-05-14 RX ADMIN — INSULIN ASPART SCH UNIT: 100 INJECTION, SOLUTION INTRAVENOUS; SUBCUTANEOUS at 12:10

## 2021-05-14 NOTE — PHARMACY PROGRESS NOTE
- Best Possible Medication History


Admit Date and Time: 05/13/21 1011


Processed by: Pharmacy


Medication History completed: Yes


Secondary Source(s): Previous admit records





As the person ultimately responsible for medication therapy, providers are able 

to order a medication from an existing home medication list in [facility] via the 

"Reconcile Routine" prior to Confirmation of that medication by support staff. 

Such practice is discouraged except when the physician, in their clinical 

judgment, deems that a medical need exists for a medication without regard to 

previous use.

## 2021-05-14 NOTE — CT REPORT
PROCEDURE:  HEAD WO

 

INDICATIONS:  Headache

 

TECHNIQUE:  

Noncontrast 4.5 mm thick angled axial sections acquired from the foramen magnum to the vertex.  For r
adiation dose reduction, the following was used:  automated exposure control, adjustment of mA and/or
 kV according to patient size.

 

COMPARISON:  5/5/2021.

 

FINDINGS:  

Image quality:  Excellent.  

 

CSF spaces:  Basal cisterns are patent.  No extra-axial fluid collections.  Ventricles are normal in 
size and shape.  

 

Brain:  No midline shift.  No intracranial masses or hemorrhage.  Gray-white matter interface is norm
al.  

 

Skull and face:  Calvarium and visualized facial bones are intact, without suspicious lesions.  

 

Sinuses:  Visualized sinuses and mastoids are clear.  

 

IMPRESSION:  No acute intracranial abnormality.

 

Reviewed by: Erich Fermin MD on 5/14/2021 10:18 AM PDT

Approved by: Erich Fermin MD on 5/14/2021 10:18 AM PDT

 

 

Station ID:  [id number]

## 2021-05-14 NOTE — DISCHARGE PLAN
Discharge Plan


Problem Reviewed?: Yes


Disposition: 01 Home, Self Care


Condition: Stable


Prescriptions: 


Losartan [Cozaar] 100 mg PO DAILY #60 tablet


Diet: Diabetic


Activity Restrictions: Activity as Tolerated


Shower Restrictions: No (fall precaution)


Instruction Topics:  Losartan tablets


Health Concerns: 


shortness of breath


Plan of Treatment: 


Your shortness of breath is resolved. You may set up a PCP to continue 

management of your HTN and diabetes. Your home Losartan dosage increased to 

100mg daily. 


Care Goals: 


stabilization and improvement of your medical conditions


Assessment: 


discussed the care plan with you, you understood and agreed


Additional Instructions or Follow Up instructions: 


You may followup with your PCP in one to two weeks. Should your symptom return 

or worsen, you may present ER or call 911 for help


Follow-Up Care: Outpatient Rehab - PT, Outpatient Rehab - OT


No Smoking: If you smoke, Please STOP!  Call [phone] for help.


Follow-up with: 


Yuri Sanchez MD [Primary Care Provider] -

## 2021-05-14 NOTE — DISCHARGE SUMMARY
Discharge Summary


Admit Date: 05/13/21


Discharge Date: 05/14/21


Discharging Provider: Abel Hernandez


Primary Care Provider: Leticia Staton


Condition at Discharge: Stable


Discharge Disposition: 01 Home, Self Care


Discharge Facility Name: home





- DIAGNOSES


Discharge Diagnoses with Status of Each Condition: 





(1) Dyspnea


resolved. pt has no more shortness of breath. pt had 96-97% sats on room air. pt

has no respiratory distress. CXR reveal pulmonary edema is resolving. pt had 

unremarkable ECHO done about 4/23/21.





(2) Hypertensive emergency


resolved. pt's home Losartan dosage increased to 100mg daily. pt may followup 

with her PCP management of her HTN





(3) Uncontrolled diabetes mellitus


improved. A1C is 11.3 from 12.1 one week ago. pt may followup with PCP continue 

management of diabetes.





(4) HLD (hyperlipidemia)


stable, resume home Lipitor





(5)hyperglycemia-induced by steroid


pt had elevated glucose level, likely caused by steroid given at ER. after 

treatment, her glucose level is controlled. resume home diabetes meds regimen.





- HPI


History of Present Illness: 


This is a 56-year-old  female with a history of uncontrolled 

Diabetes on metformin, actos and glimiperide, HTN and hyperlipidemia, who 

present ER complain of dyspnea. pt report she Woke up at 3 am today morning, she

felt shortness of breath, with cough and wheezing. pt report she has never 

smoking. she was not diagnosis of asthma and COPD before. Pt report she did feel

better after she was treated with Lasix and Duoneb. pt had two CXR which first 

show mild atypical pneumonia, second CXR reveals resolving bilateral pulmonary 

edema, suggestive of resolving edema. In the exam, pt has no cough or wheezing 

now, she denies chest pain, fever or chill. pt has normal arrange of WBC and pt 

has no fever. Pt had ECHO done about one month ago which show unremarkable with 

normal EF and normal right pressure. Lab test show pt has normal arrange WBC, 

normal arrange of troponin, BNP and D-dimer <200. In ER, patient Is afebrile, 

but has elevated blood pressure with tachypnea at RR 27 and has 96% on room air.

Medical team was consulted for management her dysnpea in observation unit. 


Discussed the care goal with patient, patient clearly state DNR/DNI. 








- HOSPITAL COURSE


Hospital Course: 


pt was admitted for shortness of breath. pt was given IV lasix in ER and in 

observation. CXR revealed resolving pulmonary edema. pt has no more shortness of

breath after treatment. Pt had 97% sats on room air. 








- ALLERGIES


Allergies/Adverse Reactions: 


                                    Allergies











Allergy/AdvReac Type Severity Reaction Status Date / Time


 


No Known Drug Allergies Allergy   Verified 05/13/21 05:52














- MEDICATIONS


Home Medications: 


                                Ambulatory Orders











 Medication  Instructions  Recorded  Confirmed


 


Glimepiride [Amaryl] 2 mg PO DAILYWM #30 tablet 03/26/18 05/13/21


 


Gabapentin 1,200 mg PO BID 06/27/18 05/13/21


 


Simvastatin 40 mg PO QPM 06/27/18 05/13/21


 


Gabapentin [Neurontin] 600 mg PO 1200 04/21/21 05/13/21


 


Pioglitazone HCl [Actos] 30 mg PO DAILY 04/21/21 05/13/21


 


Ferric Citrate [Auryxia] 210 mg PO DAILY #30 tablet 04/25/21 05/13/21


 


Blood Sugar Diagnostic [Glucometer 1 each  QID #200 strip 05/07/21 05/13/21





Strips]   


 


Blood-Glucose Meter [Glucometer] 1 each  QID #1 each 05/07/21 05/13/21


 


Insulin Aspart [NovoLOG] 2 - 10 unit SUBQ 05/07/21 05/13/21





 0800,1200,1700,2100 #10  


 


Insulin Glargine [Lantus Solostar] 10 unit SUBQ QPM #5 05/07/21 05/13/21


 


Lancets 1 each  QID #200 each 05/07/21 05/13/21


 


Losartan [Cozaar] 100 mg PO DAILY #60 tablet 05/14/21 














- PHYSICAL EXAM AT DISCHARGE


General Appearance: positive: No acute distress, Alert.  negative: Lethargic


Eyes Bilateral: positive: Normal inspection, PERRL, No lid inflammation


ENT: positive: ENT inspection nml, No signs of dehydration.  negative: Dry 

mucous membranes


Neck: positive: Nml inspection, Trachea midline.  negative: Thyromegaly, 

Tracheal deviation


Respiratory: positive: Chest non-tender, No respiratory distress.  negative: 

Wheezes, Rales, Rhonchi


Cardiovascular: positive: Regular rate & rhythm, No murmur.  negative: 

Tachycardia, Bradycardia, Systolic murmur, Diastolic murmur


Peripheral Pulses: positive: 2+


Abdomen: positive: Non-tender, Nml bowel sounds, No distention.  negative: 

Tenderness


Back: positive: Nml inspection


Skin: positive: Color nml, Warm, Dry.  negative: Cyanosis


Extremities: positive: Non-tender, Full ROM, Nml appearance.  negative: Calf 

tenderness


Neurologic/Psychiatric: positive: Oriented x3, Motor nml, Sensation nml, 

Mood/affect nml.  negative: Weakness, Sensory loss, Facial droop, Slurred/abnml 

speech, Depressed mood/affect





- LABS


Result Diagrams: 


                                 05/14/21 04:35





                                 05/14/21 04:35





- FOLLOW UP


Follow Up: 


Your shortness of breath is resolved. You may set up a PCP to continue 

management of your HTN and diabetes. Your home Losartan dosage increased to 

100mg daily. 


You may followup with your PCP in one to two weeks. Should your symptom return 

or worsen, you may present ER or call 911 for help








- TIME SPENT


Time Spent in Discharge (Minutes): 30

## 2021-05-30 ENCOUNTER — HOSPITAL ENCOUNTER (INPATIENT)
Dept: HOSPITAL 76 - ED | Age: 56
LOS: 3 days | Discharge: HOME | DRG: 291 | End: 2021-06-02
Attending: INTERNAL MEDICINE | Admitting: INTERNAL MEDICINE
Payer: COMMERCIAL

## 2021-05-30 DIAGNOSIS — Z79.4: ICD-10-CM

## 2021-05-30 DIAGNOSIS — Z66: ICD-10-CM

## 2021-05-30 DIAGNOSIS — Z87.01: ICD-10-CM

## 2021-05-30 DIAGNOSIS — J18.9: ICD-10-CM

## 2021-05-30 DIAGNOSIS — D50.9: ICD-10-CM

## 2021-05-30 DIAGNOSIS — E11.65: ICD-10-CM

## 2021-05-30 DIAGNOSIS — R51.9: ICD-10-CM

## 2021-05-30 DIAGNOSIS — E11.42: ICD-10-CM

## 2021-05-30 DIAGNOSIS — I50.31: Primary | ICD-10-CM

## 2021-05-30 DIAGNOSIS — Z79.899: ICD-10-CM

## 2021-05-30 DIAGNOSIS — I11.0: ICD-10-CM

## 2021-05-30 DIAGNOSIS — Z86.14: ICD-10-CM

## 2021-05-30 DIAGNOSIS — Z20.822: ICD-10-CM

## 2021-05-30 LAB
ALBUMIN DIAFP-MCNC: 3.5 G/DL (ref 3.2–5.5)
ALBUMIN/GLOB SERPL: 0.8 {RATIO} (ref 1–2.2)
ALP SERPL-CCNC: 88 IU/L (ref 42–121)
ALT SERPL W P-5'-P-CCNC: 37 IU/L (ref 10–60)
ANION GAP SERPL CALCULATED.4IONS-SCNC: 10 MMOL/L (ref 6–13)
AST SERPL W P-5'-P-CCNC: 29 IU/L (ref 10–42)
B PARAPERT DNA SPEC QL NAA+PROBE: NOT DETECTED
B PERT DNA SPEC QL NAA+PROBE: NOT DETECTED
BASOPHILS NFR BLD AUTO: 0.1 10^3/UL (ref 0–0.1)
BASOPHILS NFR BLD AUTO: 0.6 %
BILIRUB BLD-MCNC: 0.8 MG/DL (ref 0.2–1)
BUN SERPL-MCNC: 15 MG/DL (ref 6–20)
C PNEUM DNA NPH QL NAA+NON-PROBE: NOT DETECTED
CALCIUM UR-MCNC: 9.3 MG/DL (ref 8.5–10.3)
CHLORIDE SERPL-SCNC: 101 MMOL/L (ref 101–111)
CO2 SERPL-SCNC: 28 MMOL/L (ref 21–32)
CREAT SERPLBLD-SCNC: 0.7 MG/DL (ref 0.4–1)
EOSINOPHIL # BLD AUTO: 0.4 10^3/UL (ref 0–0.7)
EOSINOPHIL NFR BLD AUTO: 4.1 %
ERYTHROCYTE [DISTWIDTH] IN BLOOD BY AUTOMATED COUNT: 15.2 % (ref 12–15)
FLUAV RNA RESP QL NAA+PROBE: NOT DETECTED
GFRSERPLBLD MDRD-ARVRAT: 105 ML/MIN/{1.73_M2} (ref 89–?)
GLOBULIN SER-MCNC: 4.3 G/DL (ref 2.1–4.2)
GLUCOSE SERPL-MCNC: 211 MG/DL (ref 70–100)
HAEM INFLU B DNA SPEC QL NAA+PROBE: NOT DETECTED
HCOV 229E RNA SPEC QL NAA+PROBE: NOT DETECTED
HCOV HKU1 RNA UPPER RESP QL NAA+PROBE: NOT DETECTED
HCOV NL63 RNA ASPIRATE QL NAA+PROBE: NOT DETECTED
HCOV OC43 RNA SPEC QL NAA+PROBE: NOT DETECTED
HCT VFR BLD AUTO: 34.6 % (ref 37–47)
HGB UR QL STRIP: 10.9 G/DL (ref 12–16)
HMPV AG SPEC QL: NOT DETECTED
HPIV1 RNA NPH QL NAA+PROBE: NOT DETECTED
HPIV2 SPEC QL CULT: NOT DETECTED
HPIV3 AB TITR SER CF: NOT DETECTED {TITER}
HPIV4 RNA SPEC QL NAA+PROBE: NOT DETECTED
LIPASE SERPL-CCNC: 21 U/L (ref 22–51)
LYMPHOCYTES # SPEC AUTO: 2 10^3/UL (ref 1.5–3.5)
LYMPHOCYTES NFR BLD AUTO: 22.5 %
M PNEUMO DNA SPEC QL NAA+PROBE: NOT DETECTED
MCH RBC QN AUTO: 30.1 PG (ref 27–31)
MCHC RBC AUTO-ENTMCNC: 31.5 G/DL (ref 32–36)
MCV RBC AUTO: 95.6 FL (ref 81–99)
MONOCYTES # BLD AUTO: 0.6 10^3/UL (ref 0–1)
MONOCYTES NFR BLD AUTO: 6.7 %
NEUTROPHILS # BLD AUTO: 5.9 10^3/UL (ref 1.5–6.6)
NEUTROPHILS # SNV AUTO: 9 X10^3/UL (ref 4.8–10.8)
NEUTROPHILS NFR BLD AUTO: 66 %
NRBC # BLD AUTO: 0.02 X10^3/UL
NRBC # BLD AUTO: 0.2 /100WBC
PDW BLD AUTO: 10.1 FL (ref 7.9–10.8)
PLATELET # BLD: 279 10^3/UL (ref 130–450)
POTASSIUM SERPL-SCNC: 3.6 MMOL/L (ref 3.5–5)
PROT SPEC-MCNC: 7.8 G/DL (ref 6.7–8.2)
RBC MAR: 3.62 10^6/UL (ref 4.2–5.4)
RSV RNA RESP QL NAA+PROBE: NOT DETECTED
RV+EV RNA SPEC QL NAA+PROBE: NOT DETECTED
SARS-COV-2 RNA PNL SPEC NAA+PROBE: NOT DETECTED
SODIUM SERPLBLD-SCNC: 139 MMOL/L (ref 135–145)

## 2021-05-30 PROCEDURE — 83690 ASSAY OF LIPASE: CPT

## 2021-05-30 PROCEDURE — 87150 DNA/RNA AMPLIFIED PROBE: CPT

## 2021-05-30 PROCEDURE — 80053 COMPREHEN METABOLIC PANEL: CPT

## 2021-05-30 PROCEDURE — 85025 COMPLETE CBC W/AUTO DIFF WBC: CPT

## 2021-05-30 PROCEDURE — 99285 EMERGENCY DEPT VISIT HI MDM: CPT

## 2021-05-30 PROCEDURE — 80048 BASIC METABOLIC PNL TOTAL CA: CPT

## 2021-05-30 PROCEDURE — 71045 X-RAY EXAM CHEST 1 VIEW: CPT

## 2021-05-30 PROCEDURE — 94660 CPAP INITIATION&MGMT: CPT

## 2021-05-30 PROCEDURE — 96374 THER/PROPH/DIAG INJ IV PUSH: CPT

## 2021-05-30 PROCEDURE — 84484 ASSAY OF TROPONIN QUANT: CPT

## 2021-05-30 PROCEDURE — 83735 ASSAY OF MAGNESIUM: CPT

## 2021-05-30 PROCEDURE — 0202U NFCT DS 22 TRGT SARS-COV-2: CPT

## 2021-05-30 PROCEDURE — 99284 EMERGENCY DEPT VISIT MOD MDM: CPT

## 2021-05-30 PROCEDURE — 83605 ASSAY OF LACTIC ACID: CPT

## 2021-05-30 PROCEDURE — 71250 CT THORAX DX C-: CPT

## 2021-05-30 PROCEDURE — 84100 ASSAY OF PHOSPHORUS: CPT

## 2021-05-30 PROCEDURE — 87040 BLOOD CULTURE FOR BACTERIA: CPT

## 2021-05-30 PROCEDURE — 83036 HEMOGLOBIN GLYCOSYLATED A1C: CPT

## 2021-05-30 PROCEDURE — 94640 AIRWAY INHALATION TREATMENT: CPT

## 2021-05-30 PROCEDURE — 83880 ASSAY OF NATRIURETIC PEPTIDE: CPT

## 2021-05-30 PROCEDURE — 93005 ELECTROCARDIOGRAM TRACING: CPT

## 2021-05-30 PROCEDURE — 36415 COLL VENOUS BLD VENIPUNCTURE: CPT

## 2021-05-30 RX ADMIN — SODIUM CHLORIDE, PRESERVATIVE FREE SCH ML: 5 INJECTION INTRAVENOUS at 11:11

## 2021-05-30 RX ADMIN — INSULIN ASPART SCH UNIT: 100 INJECTION, SOLUTION INTRAVENOUS; SUBCUTANEOUS at 21:43

## 2021-05-30 RX ADMIN — HYDRALAZINE HYDROCHLORIDE SCH MG: 20 INJECTION INTRAMUSCULAR; INTRAVENOUS at 22:22

## 2021-05-30 RX ADMIN — ENOXAPARIN SODIUM SCH MG: 100 INJECTION SUBCUTANEOUS at 11:11

## 2021-05-30 RX ADMIN — SODIUM CHLORIDE, PRESERVATIVE FREE SCH ML: 5 INJECTION INTRAVENOUS at 18:53

## 2021-05-30 RX ADMIN — HYDRALAZINE HYDROCHLORIDE SCH MG: 20 INJECTION INTRAMUSCULAR; INTRAVENOUS at 14:41

## 2021-05-30 RX ADMIN — SODIUM CHLORIDE SCH MLS/HR: 900 INJECTION INTRAVENOUS at 22:28

## 2021-05-30 RX ADMIN — ATORVASTATIN CALCIUM SCH MG: 10 TABLET, FILM COATED ORAL at 21:31

## 2021-05-30 RX ADMIN — INSULIN ASPART SCH: 100 INJECTION, SOLUTION INTRAVENOUS; SUBCUTANEOUS at 17:28

## 2021-05-30 RX ADMIN — INSULIN GLARGINE SCH UNIT: 100 INJECTION, SOLUTION SUBCUTANEOUS at 21:43

## 2021-05-30 RX ADMIN — GABAPENTIN SCH MG: 400 CAPSULE ORAL at 21:31

## 2021-05-30 RX ADMIN — GABAPENTIN SCH MG: 300 CAPSULE ORAL at 13:09

## 2021-05-30 RX ADMIN — LOSARTAN POTASSIUM SCH MG: 50 TABLET, FILM COATED ORAL at 13:08

## 2021-05-30 RX ADMIN — Medication SCH AMP: at 21:31

## 2021-05-30 NOTE — XRAY REPORT
PROCEDURE:  Chest 1 View X-Ray

 

INDICATIONS:  dyspnea

 

TECHNIQUE:  One view of the chest was acquired.  

 

COMPARISON:  5/13/2021, 4/22/2021, 4/21/2021

 

FINDINGS:  

 

Surgical changes and devices:  None.  

 

Lungs and pleura:  Abnormal interstitial prominence is seen. Trace blunting of the costophrenic angle
s can be seen. No pneumothorax is seen.

 

Mediastinum:  Mediastinal contours appear normal.  Heart size is normal.  

 

Bones and chest wall:  No suspicious bony lesions.  Overlying soft tissues appear unremarkable.  

 

 

IMPRESSION:  

 

Interstitial prominence is seen. A history of MRSA pneumonia is given and the appearance is consisten
t with such.

 

Likely small bilateral pleural effusions.

 

 

 

 

Note: No significant discrepancy from the preliminary report.

 

Reviewed by: Tripp Shen MD on 5/30/2021 7:06 AM [facility]

Approved by: Tripp Shen MD on 5/30/2021 7:06 AM [facility]

 

 

Station ID:  [id number]

## 2021-05-30 NOTE — EXTERNAL MEDICAL SUMMARY RPT
Continuity of Care Document

                             Created on:May 30, 2021



Patient:MARGOT SANCHEZ

Sex:Female

:1965

External Reference #:[id number]





Demographics







                          Phone                     Unavailable

 

                          Preferred Language        Unknown

 

                          Marital Status            Unknown

 

                          Hindu Affiliation     Unknown

 

                          Race                      Unknown

 

                          Ethnic Group              Unknown









Author







                          Organization              [facility]

 

                          Address                    [place] [id number]

 

                          Phone                     [phone]









Allergies





Encounters





Medications





Problems





Results

## 2021-05-30 NOTE — ADVANCE CARE PLANNING NOTE
Advance Care Planning





- Planning Encounter


Date: 21


Time: 15:00


Purpose: 


confirm code status


Parties in Attendance: 





Hospitalist, ICU RN, Patient


Decisional Capacity of the Patient: 


An alert oriented female who was still completely independent in her life, makes

her own decisions, still works full-time.








- Diagnosis for Encounter


(1) Do not resuscitate


Summary: 


Patient wants to make sure we document this.  She says that even of her  

counterman's this, we are not to let him do so








- Encounter


Subjective/Patient's Story: 


She is a adam 56-year-old [place]  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 

a time.  Traveling all over the country from the [facility] and gradually made 

her way to the [place] a little over 4 years ago.  Her  follow-

up with that here, and they were spending more more time here.  He is her second

.  She says "do not even get me started on my first ".  Apparently

there was adultery, and he ended up marrying her best friend.  She has 3 

children with her first .  All of them are grown, either graduating 

college or in full-time jobs.  They are all stable and happy and she is a very 

proud mom and a very proud grandmom.  She met her second  when she was w

orking a nursing job at a [employer].  Her  is a Mandaen 

 and they were spending more more time together with community health 

issues.  He eventually asked her to  him.  While she is always had high 

blood pressure, the diabetes she has is a relatively new diagnosis.  Even before

she was a diabetic she had already developed peripheral neuropathy.  Insulin was

started in the last 4 months.  She regards her self as independent, feisty, 

opinionated, with a lot of energy.  The last month has been a little bit 

daunting on how much is affected her health.  She has been in the hospital now 

for times since .  She has a chronic cough, intermittent leg edema, 

intermittent orthopnea.  She just wants all of this to be controlled and taken 

care of.  She is exasperated with the medical system.  She is in the midst of 

changing primary care providers to get stable opinion in stable care.





Without all that being said, she wants us to make sure that everyone knows that 

she is a DO NOT RESUSCITATE.  As a nurse, she has done CPR, been part of 

intubations, cracked ribs, done cardioversions, and she firmly believes that the

survival rate is less than 10%.  And even if you do survive, the 10% that is 

alive is not really alive and what she considers a "good way".  So she never 

wants to be resuscitated.  She warns me that her  may contraband that, 

and if she is unconscious he will be her power of , but under no 

circumstances to do what he says.





Objective/Medical Story: 


An absolutely adam 56-year-old black female who we are admitting for the 

fourth time.   she was admitted for probable mild preserved ejection 

heart failure which subsequently was complicated by community-acquired pneumonia

during her stay.  Second admission was for syncope and orthostatic dizziness 

requiring fluid resuscitation.  Change in her medication.  Third admission was 

May 13 for acute shortness of breath in the face of severely elevated blood 

pressure that responded nicely to Lasix.  Chest x-ray showed resolving pulmonary

infiltrates from  admission.  She is now admitted today with acute heart

failure superimposed on chronic orthopnea, cough, leg edema for the last week or

so.  Her blood pressure is severely elevated with systolic over 200s and 

diastolic over 100s.  She does not have a fever or white cell count.  CT was 

done of the chest, and she has bilateral consolidations at the bases, 

groundglass opacities, interstitial edema.  I am thinking that she has chronic 

preserved ejection fraction heart failure and atelectasis and edema has resulted

in a subsequent pneumonia.





Goals of Care: 


1.  To get through this acute episode of care and go back home so she can get 

back to work.  She would like me to do a letter of return to work in the next 1 

to 2 weeks.





2.  She is a black female over the age of 50 with diabetes and hypertension.  

She is at high risk for this stiff heart and preserved ejection fraction heart 

failure.  Echocardiogram shows a left atrium of 39 mm where the cut off 

identifying her risk would be more at 40 mm.  But she has all the risk factors. 

I have asked her to please make sure that she gets referred to a cardiologist 

that specializes in exquisite blood pressure control.  She is already 

demonstrated that if we overtreat her she gets orthostatic with syncope.  But if

we back off on her blood pressure she gets hypertensive urgency.





Plan: 





DO NOT RESUSCITATE.


POLST form will be filled out.


Code Status: Do Not Attempt Resuscitation


Time spent on advance care plannin minutes

## 2021-05-30 NOTE — EXTERNAL MEDICAL SUMMARY RPT
Continuity of Care Document

                             Created on:May 30, 2021



Patient:MARGOT SANCHEZ

Sex:Female

:1965

External Reference #:[id number]





Demographics







                          Phone                     Unavailable

 

                          Preferred Language        Unknown

 

                          Marital Status            Unknown

 

                          Buddhism Affiliation     Unknown

 

                          Race                      Unknown

 

                          Ethnic Group              Unknown









Author







                          Organization              [facility]

 

                          Address                    [place] [id number]

 

                          Phone                     [phone]









Allergies





Encounters





Medications





Problems





Results

## 2021-05-30 NOTE — HISTORY & PHYSICAL EXAMINATION
Chief Complaint





- Chief Complaint


Chief Complaint: acute sob





History of Present Illness





- Admitted From


Admitted From:: Home via EMS





- History Obtained From


Records Reviewed: [facility]


History obtained from: patient and sign out from Dr. Luna


Exam Limitations: none





- History of Present Illness


HPI Comment/Other: 


The patient is a 56-year-old black female who has a history of uncontrolled 

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  This is her fourth admission since . 





 In April she was admitted as a community-acquired pneumonia and presented as 

already being vaccinated, and was not feeling well.  In the emergency room she 

was hypotensive, required fluid resuscitation, responded to that and and 

evaluation she had a chest x-ray with "pneumonia".  With that admission, her 

white cell count was normal.  And with all subsequent admissions her white cell 

count continues to be normal except for May 14 when she was 14.8.  She does have

chest x-rays dating to 2018 in our EMR.  As such a chest x-ray with her first 

admission  was compared to 2018 chest x-ray and there is no 

evidence of acute pulmonary process.  When she developed a fever, a repeat chest

x-ray was done and she was found to have a new right upper lobe and right p

erihilar infiltrate.  As such she was treated as pneumonia with azithromycin and

Rocephin.  She had a fever over multiple days.  Blood cultures were negative.  

Echocardiogram done during that stay showed to have an ejection fraction of 60 

to 65% with a normal RVSP and only minimal tricuspid regurgitation. LA was 39 

mm/m2 and just at the cut off indicating diastolic heart failure (40 mm).  Her 

BP was 130s to 140s systolic.  Diastolic, however, was up to 109.  During her 

stay she developed orthostatic dizziness and almost collapsed walking in her 

room to the bathroom.  As such she was given a liter of fluid, blood pressure 

medications were held, but they were resumed at discharge.Blood pressure at 

discharge was 138/83.


She was discharged to complete antibiotic therapy With Augmentin.





She saw her primary care provider in follow-up.  He cleared her to go back to 

work.  She let them know that she was still really dizzy and did not feel well. 

He felt that she was stable, and scheduled her for routine follow-up as well as 

routine preventative exam follow-up such as a colonoscopy.





Return to the emergency room May 5 complaining of dizziness worse with standing.

At one point she was dizzy enough she went to the urgent care clinic.  While 

there she passed out and EMS was called to bring her to ER.  She was found to 

have uncontrolled diabetes, orthostatic hypotension.  Blood pressure was 178/85.

 However with checking orthostatic vital signs her supine blood pressure was 

148/82 with a pulse of 74.  Sitting blood pressure was 138/73 with a heart rate 

of 80.  Standing blood pressure was 109/57 with a heart rate of 89.  She 

responded to IV fluids and stopping some of her blood pressure medications.  And

she was sent home.  Blood pressure at discharge was 130/73.  Chest x-ray with M

ay 5 evaluation showed her to have no pleural effusions or pneumothorax and her 

lungs were clear.





She then returned May 13 acutely short of breath, with wheezing.  In the 

emergency room her inferior vena cava was engorged.  DuoNeb resulted in minimal 

improvement in her symptoms.  Because of the inferior vena cava engorgement she 

was given Lasix and had almost immediate response and felt immensely better.  

Blood pressure when she presented was 204/101.  However BNP and troponins were 

negative and did not coorelated with this diagnosis of pulmonary edema.   Chest 

x-ray showed resolving opacities.  Bilateral.  There were perihilar with right 

greater than left.  This was suggestive of resolving edema.  Orthostatic blood 

pressure was done during that stay and she was not orthostatic.  Blood pressure 

at discharge was 112/60.





She now returns to us on May 30.  She states that she has been feeling good 

enough to go back to work.  She was actually scheduled to return to work today. 

But for the last week she has been having increasing leg edema.  Her thighs were

starting to feel exceedingly heavy especially yesterday.  When she would lay 

down at night, she would feel short of breath and start to wheeze so she have to

sit up for a while until she felt better and then fall back asleep.  She denies 

fever, chills.  Cough is daily and is not productive.  The cough will get worse 

the more work she does.  Since she is not working, she has become very detail 

oriented about cleaning her house.  So she has been cleaning her windows, 

dusting all the furniture, picking up furniture and mopping the floors, cleaning

out her closets.  She has been able to do all of that and the cough accompanies 

that work.  When she sits down and eats dinner or is watching TV the cough goes 

away.  She does not describe it as dyspnea on exertion.  She lives in her own 

home.  No pets.  No birds.  Not exposed to farm life. She began having severe 

shortness of breath that woke her up from her sleep approximately 30 minutes 

prior to admission in the early morning hours.  She had been well all day 

yesterday, and she has had varying degrees of shortness of breath since she had 

been admitted for pneumonia.   was the historian because she was in 

severe respiratory distress. Because she is a [place] patient, she actually tried

calling the [place] nurse before coming in.  The [place] nurse was alarmed because

the patient could not even speak normally with her shortness of breath.  And she

instructed the patient to come to the emergency room. Blood pressure on her 

presentation was 210/103 with a respiratory rate of 32.  3 L mask resulted in a 

96% O2 sat.  Chest x-ray shows interstitial prominence that is abnormal.  Trace 

blunting of the costophrenic angles.  Small bilateral pleural effusions.  The 

radiologist states that "a history of MRSA pneumonia is given and the appearance

is consistent with such".The emergency room provider gave her albuterol, Lasix 

40 mg IV push, DuoNeb, albuterol.  She has felt substantially better.  We 

transferred to the ICU thinking she was going to need BiPAP but she is now on 

just nasal cannula, 2 L.  Blood pressure is still significantly elevated at 202-

207 systolic.   diastolic.  Because the chest x-ray showed questionable 

pneumonia, we did a CT of the chest. The ER physician gave her cefepime and 

vancomycin. CT of the chest has consolidated infiltrates at the lung bases 

including air bronchograms.  Milder patchy infiltrates as well as groundglass 

opacities are seen elsewhere.  Infection is suspected.  A history of MRSA 

pneumonia is given.








History





- Past Medical History


Cardiovascular: reports: Hypertension, High cholesterol, Other


Respiratory: reports: Pneumonia, Shortness of breath


Neuro: reports: Peripheral neuropathy


Endocrine/Autoimmune: reports: Type 2 diabetes (For only 4 years.  She went from

oral hypoglycemics to insulin in the last visit.  She is on Lantus which cost 

her $375 a month.)


GI: reports: None


GYN: reports: Other (. PID s/p surgery)


: reports: None


HEENT: reports: Other (cataracts, lasik)


Psych: reports: None


Musculoskeletal: reports: None


Derm: reports: None


MRSA Hx?: Yes





- Family & Social History


Family History: Mother: , Father: 


Family History Comment/Other: Dad and Mom both  in their late 60's. They had

DM,HTN, chol, CAD at their deaths. She had 19 siblings. 3 sisters have  of 

cancer; 1 was breast but the other two she doesn't know what kind of cancer. 1 

brother  of GSW. 1 brother  of MI. All of her siblings except 2 have DM.

Her 3 children are healthy


Living arrangement: At home


Living Situation: With spouse/s.o.


Social History Notes: she was born in [facility] and has lived all over the 

[facility] bc of work and family. She has been  to her 2nd  for 19 

years with anniversary in next month. They met at her cousins house one night 

for dinner on a blind date and have been together even since. Her 3 children 

live in [facility]. She never smoked, and never really even socially drank 

since she doesn't like the taste of it. 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 

 do 3-4 months at a time in different locations. [place]

time went on and her  fell in love with [place] LA[place]A. The only way she 

would stay is if he bought her a house, and he did. So they live in [place]. 

She laughs and says it's "still too cold here!".  Non smoker and no hx of 

alcohol abuse or any recreational substance abuse.  Today was to be her first 

day back at work.





- Substance History


Use: Uses substance without health or social issues: NONE


Abuse: Recurrent use of substance despite neg consequences: NONE


Dependence: Experiences withdrawal or developed tolerances: NONE





- POLST


Patient has POLST: No


POLST Status: DNR





Meds/Allgy





- Home Medications


Home Medications: 


                                Ambulatory Orders











 Medication  Instructions  Recorded  Confirmed


 


Glimepiride [Amaryl] 2 mg PO DAILYWM #30 tablet 18


 


Gabapentin 1,200 mg PO BID 18


 


Simvastatin 40 mg PO QPM 18


 


Gabapentin [Neurontin] 600 mg PO 1200 21


 


Pioglitazone HCl [Actos] 30 mg PO DAILY 21


 


Ferric Citrate [Auryxia] 210 mg PO DAILY #30 tablet 21


 


Blood Sugar Diagnostic [Glucometer 1 each  QID #200 strip 21





Strips]   


 


Blood-Glucose Meter [Glucometer] 1 each  QID #1 each 21


 


Insulin Aspart [NovoLOG] 2 - 10 unit SUBQ 21





 0800,1200,1700,2100 #10  


 


Insulin Glargine [Lantus Solostar] 10 unit SUBQ QPM #5 21


 


Lancets 1 each  QID #200 each 21


 


Losartan [Cozaar] 100 mg PO DAILY #60 tablet 21














- Allergies


Allergies/Adverse Reactions: 


                                    Allergies











Allergy/AdvReac Type Severity Reaction Status Date / Time


 


No Known Drug Allergies Allergy   Verified 21 05:52














Review of Systems





- Constitutional


Constitutional: reports: Poor appetite (ate yesterday am but just doesn't want 

to eat much).  denies: Fatigue, Fever, Chills, Malaise, Weakness, Diaphoresis, 

Night sweats





- Eyes


Eyes: reports: Blurred vision (left eye from cataract), Vision loss (with age). 

 denies: Pain, Irritation, Spots in vision, Field loss





- Ears, Nose & Throat


Ears, Nose & Throat: denies: Ear pain, Hearing loss, Hearing aids, Nasal 

obstruction, Nasal congestion, Postnasal drainage, Sore throat, Hoarseness





- Cardiovascular


Cariovascular: reports: Edema, Syncope, Exertional dyspnea, Decr. exercise 

tolerance, Orthopnea.  denies: Irregular heart rate, Palpitations, Chest pain





- Respiratory


Respiratory: reports: Cough, Wheezing, Orthopnea, SOB with exertion.  denies: 

Sputum production, Snoring, Hemoptysis, SOB at rest





- Gastrointestinal


Gastrointestinal: reports: Abdominal pain (just now. severe cramping w BM).  

denies: Abdominal distention, Constipation, Diarrhea, Black stools, Bloody 

stools, Nausea, Vomiting, Coffee grounds emesis, Reflux/heartburn





- Genitourinary


Genitourinary: denies: Dysuria, Frequency, Urgency, Hematuria, Incontinence, 

Flank pain





- Musculoskeletal


Musculoskeletal: reports: Back pain (mild and occasional), Joint pain (left hip 

sometimes when she stands on her feet at work or works at home).  denies: Muscle

 pain, Muscle aches, Stiffness, Limited range of motion, Joint swelling





- Integumentary


Integumentary: denies: Rash, Lesions, Dryness, Lumps





- Neurological


Neurological: reports: Numbness (both legs).  denies: General weakness, Focal 

weakness, Headache, Dizziness, Memory problems, Pre-existing deficit, Seizures





- Psychiatric


Psychiatric: denies: Depression, Anxiety, Suicidal, Hallucinations





- Endocrine


Endocrine: denies: Polyuria, Polydypsia, Polyphagia





- Hematologic/Lymphatic


Hematologic/Lymphatic: denies: Anemia, Bruising, Petechiae, Blood clots


Prior Level of Functionality: 





Completely independent with activities of daily living.  She just cleaned her 

house from top to bottom including windows, rugs, furniture over the last 2 

days.  Drives a car.  Works full-time and plan to going to work today until this

 happened.





Exam





- Vital Signs


Reviewed Vital Signs: Yes


Vital Signs: 





                                Vital Signs x48h











  Temp Pulse Pulse Resp BP BP Pulse Ox


 


 21 12:00    86  23   206/98 H  96


 


 21 11:00  36.6 C   82  24   207/102 H  95


 


 21 10:00    94  22   202/104 H  95


 


 21 09:00    87  24   203/88 H  97


 


 21 07:05   80     


 


 21 06:51   82   45 H  150/81 H   100


 


 21 06:47  36.4 C L  82   92 H  117/87 H   99


 


 21 06:26   80   17   


 


 21 06:11   84   26 H  152/79 H   99


 


 21 06:09   87   22   


 


 21 06:05   85     


 


 21 05:34   95   18  194/100 H   15 L


 


 21 05:21   92   26 H  195/105 H   98


 


 21 04:55  36.4 C L  100   32 H  210/103 H   96














- Physical Exam


General Appearance: positive: No acute distress, Alert, Other (She is already 

received Lasix and antibiotics, and is on facemask and only down to nasal cannul

a.  Breathing comfortably.  Urine output at this moment is 2600 cc.  So far she 

is -1940 cc overall.)


Eyes Bilateral: positive: PERRL, EOMI


ENT: positive: No signs of dehydration


Neck: positive: No JVD.  negative: Stiff neck


Respiratory: positive: No respiratory distress, Rales.  negative: Wheezes, 

Rhonchi


Cardiovascular: positive: Regular rate & rhythm.  negative: Systolic murmur, 

Gallop/S4, Friction rub


Peripheral Pulses: positive: 1+


Abdomen: positive: Non-tender, No organomegaly, Nml bowel sounds, No distention


Skin: positive: Warm, Dry


Extremities: positive: Non-tender, Pedal edema


Neurologic/Psychiatric: positive: Oriented x3, CN's nml (2-12), Motor nml





Conclusion/Plan





- Problem List


(1) Pneumonia


Conclusion/Plan: 


Female has not been hospitalized 4 times in the last 4 to 5 weeks.  I do not 

think this is a simple community-acquired pneumonia.  However I do think she may

 be developing pneumonia in response to heart failure with preserved ejection 

fraction causing atelectasis or edema that then leads to pneumonia.  She does 

not have an elevated WBC or fever now.  However the CT of the chest that I 

ordered does show bilateral consolidation, on top of pulmonary edema and 

groundglass opacities.





Plan:





She has been treated with cefepime and vancomycin in the ER with the assumption 

that she has a recurrent pneumonia and would be at risk for staph, Pseudomonas. 

 I believe that in the past, the thought process was that because she had MRSA 

swab positive, we should worry about MRSA pneumonia.  So far there is been no 

clinical indication for this with this admission.   Sputum cultures will 

definitely help us.  And as such I will obtain sputum cultures.





Continue cefepime and vancomycin until cultures come back. I am leaning toward 

stopping her abx depending on her response to lowering her blood pressure.  








Qualifiers: 


   Pneumonia type: due to unspecified organism   Laterality: bilateral   Lung 

location: unspecified part of lung   Qualified Code(s): J18.9 - Pneumonia, 

unspecified organism   





(2) Acute heart failure with preserved ejection fraction


Conclusion/Plan: 


Literature states that a black female over 50, with uncontrolled hypertension, 

is at high risk for this.  Her echocardiogram from April shows a borderline 

enlarged left atrium.  She would be meeting the cutoff criteria at 40 mm, and 

she is only 38.  Clinically, I think she fits the scenario.





Plan:


Aggressive blood pressure control


Add hydralazine and if this does not work, can consider IV drip medications


Resume her usual losartan


Diuretics


She is already shown to us that her blood pressure is very sensitive to our 

treatment.  She will get orthostatic hypotension.








(3) Diabetes mellitus type 2, uncontrolled, with complications


Conclusion/Plan: 


Complications include peripheral neuropathy. She presented as uncontrolled 

diabetes with her last 2 admissions.  But she states that her fasting glucose is

 between 70 and 200 in the morning.  Her lunch glucose is always low in the 70s 

and 90s.  That has been for the last 2 weeks.





In the outpatient setting she is on Actos which be contraindicated in congestive

 heart failure.  Sulfonylurea in the form of Amaryl.  Lantus and short acting 

insulin were prescribed May 7.As a [place] patient, this patient is only allowed 

[facility] unless they have changed their formulary.  So she has been paying for 

medication that is outside the [place] formulary.





Plan: Lantus with sliding scale insulin for right now.


I will verify the formulary coverage for [facility] and switch her to [facility] if that is

 what their formulary covers.


No Actos


Check A1c








(4) Uncontrolled hypertension


Conclusion/Plan: 


On her losartan.  I have resumed that. Start hydralazine 10 mg IV push 3 times 

daily as needed high blood pressure.  If she does not respond to the 

hydralazine, consider this a hypertensive urgency and start IV drip.








(5) Anemia


Conclusion/Plan: 


Iron level on  was 8.  It had risen to 39, normal, by May 3.  TIBC was 

303 22.  Percent saturation was 3 and 12.  Transferrin 214 and 230.  Ferritin 

154 and 127.  B12 normal at 408.


Qualifiers: 


   Anemia type: iron deficiency 





(6) Peripheral neuropathy


Conclusion/Plan: 


Due to diabetes mellitus.  Patient takes gabapentin high doses.  1200 mg twice 

daily with 600 mg at lunch.  She really wants us to make sure that we order that

 for her and that will be done.


Qualifiers: 


   Peripheral neuropathy type: polyneuropathy associated with underlying disease

   Qualified Code(s): G63 - Polyneuropathy in diseases classified elsewhere   





- Lab Results


Lab results reviewed: Yes


Fish Bones: 


                                 21 05:29





                                 21 05:29





- Diagnostic Imaging Results


Diagnostic Imaging Results: positive: Final report reviewed





- EKG Results


EKG Interpreted Independently: No


EKG Comparison: Unchanged from prior EKG





Core Measures





- Anticipated LOS


I expect patient to be DC'd or transferred within 96 hours.: Yes





- DVT/VTE - Prophylaxis


VTE/DVT Device ordered at admit?: Yes

## 2021-05-30 NOTE — CT REPORT
PROCEDURE:  CHEST WO

 

INDICATIONS:  WORSENING BILATERAL INFILTRATES ON CXR

 

TECHNIQUE: 

Noncontrast 5 mm thick sections acquired from the pulmonary apices to the posterior costophrenic angl
es.  7 mm thick coronal and sagittal MIP reformats were then acquired.  For radiation dose reduction,
 the following was used:  automated exposure control, adjustment of mA and/or kV according to patient
 size.

 

COMPARISON:  Chest radiograph, 5/30/2021. Prior chest CT, 3/25/2018.

 

FINDINGS:  

Image quality:  Excellent.  

 

Lungs and pleura: Small bilateral pleural effusions are seen, right larger than left. Consolidated john paul
ng can be seen at the lung bases, including air bronchograms. Patchy infiltrates are seen, right wors
e than left and primarily inferiorly. Mild groundglass type opacities are also seen. No pneumothorax 
is seen.

 

Mediastinum:  Heart size is normal.  No pericardial effusion.  No mediastinal adenopathy by size crit
eria.  Thoracic aorta and central pulmonary arteries are normal in size.  Esophagus is normal in selena
meghann.  No hiatal hernia.  

 

Bones and chest wall:  No suspicious bony lesions.  No vertebral body compression fractures.  There i
s accentuated thoracic kyphosis.   No axillary or supraclavicular adenopathy by size criteria.  The t
hyroid is normal in size and there are no incidental findings.

 

Abdomen:  Visualized upper abdominal solid organs and bowel loops appear normal in the absence of con
trast.  

 

 

IMPRESSION:  

 

Consolidated infiltrates at the lung bases, including air bronchograms. Milder, patchy infiltrates as
 well as groundglass opacities can be seen elsewhere. Infection is suspected. A history of MRSA pneum
onia is given.

 

Small bilateral pleural effusions are seen, right larger than left.

 

Reviewed by: Tripp Shen MD on 5/30/2021 7:52 AM ARI

Approved by: Tripp Shen MD on 5/30/2021 7:52 AM AKNEO

 

 

Station ID:  [id number]

## 2021-05-30 NOTE — PHARMACY PROGRESS NOTE
- Best Possible Medication History


Admit Date and Time: 05/30/21 0723


Processed by: Nursing


Medication History completed: Yes


Patient Interview: Completed


Secondary Source(s): Pharmacy records, Insurance records, Previous admit records





As the person ultimately responsible for medication therapy, providers are able 

to order a medication from an existing home medication list in [facility] via the 

"Reconcile Routine" prior to Confirmation of that medication by support staff. 

Such practice is discouraged except when the physician, in their clinical 

judgment, deems that a medical need exists for a medication without regard to 

previous use.

## 2021-05-30 NOTE — EXTERNAL MEDICAL SUMMARY RPT
Continuity of Care Document

                             Created on:May 30, 2021



Patient:MARGOT SANCHEZ

Sex:Female

:1965

External Reference #:[id number]





Demographics







                          Phone                     Unavailable

 

                          Preferred Language        Unknown

 

                          Marital Status            Unknown

 

                          Scientology Affiliation     Unknown

 

                          Race                      Unknown

 

                          Ethnic Group              Unknown









Author







                          Organization              [facility]

 

                          Address                    [place] [id number]

 

                          Phone                     [phone]









Allergies





Encounters





Medications





Problems





Results

## 2021-05-30 NOTE — ED PHYSICIAN DOCUMENTATION
PD HPI DYSPNEA





- Stated complaint


Stated Complaint: SOA





- Chief complaint


Chief Complaint: Resp





- History obtained from


History obtained from: Patient, Family (spouse (in ED at bedside))





- History of Present Illness


Timing - onset: How many minutes ago (approximately 30 minutes PTA)


Timing - onset during: Sleep


Timing - details: Abrupt onset


Pain level now: 0


Improved by: Rest, Sitting up


Worsened by: Exertion, Laying flat


Associated symptoms: Cough, Bilateral edema.  No: Fever, Hemoptysis, Wheezing, 

Chest pain / discomfort, Palpitations, Diaphoresis


Similar symptoms before: Diagnosis (pneumonia)


Recently seen: Admitted





- Additional information


Additional information: 





c/o shortness of breath that woke her from sleep approximately 30 minutes PTA. 

 says she was well all day yesterday, although he also says patient has 

had varying degree of shortness of breath since being admitted to [facility] last 

month for pneumonia; she has had two previous [facility] ED visits this month, as 

well, and was admitted on those two visits (thus three admissions over past 5 

weeks).  says prior to last month's admission, she did not have any 

pulmonary problems nor diagnoses. 


Patient provides limited contribution to HPI/ROS due to respiratory distress





Review of Systems


Unable to obtain: Other (limited due to respiratory distress)


Constitutional: denies: Fever


Cardiac: reports: Pedal edema.  denies: Chest pain / pressure, Palpitations


Respiratory: reports: Dyspnea, Cough.  denies: Hemoptysis, Wheezing


GI: denies: Abdominal Pain


Musculoskeletal: reports: Extremity swelling





PD PAST MEDICAL HISTORY





- Past Medical History


Cardiovascular: Hypertension, High cholesterol, Other


Respiratory: None


Neuro: None


Endocrine/Autoimmune: Type 2 diabetes


GI: None


GYN: Other


: None


HEENT: Other


Psych: None


Musculoskeletal: None


Derm: None





- Past Surgical History


Past Surgical History: Yes


General: Other


/GYN: Other





- Present Medications


Home Medications: 


                                Ambulatory Orders











 Medication  Instructions  Recorded  Confirmed


 


Glimepiride [Amaryl] 2 mg PO DAILYWM #30 tablet 03/26/18 05/30/21


 


Gabapentin 1,200 mg PO BID 06/27/18 05/30/21


 


Simvastatin 40 mg PO QPM 06/27/18 05/30/21


 


Gabapentin [Neurontin] 600 mg PO 1200 04/21/21 05/30/21


 


Pioglitazone HCl [Actos] 30 mg PO DAILY 04/21/21 05/30/21


 


Ferric Citrate [Auryxia] 210 mg PO DAILY #30 tablet 04/25/21 05/30/21


 


Blood Sugar Diagnostic [Glucometer 1 each  QID #200 strip 05/07/21 05/30/21





Strips]   


 


Blood-Glucose Meter [Glucometer] 1 each  QID #1 each 05/07/21 05/30/21


 


Insulin Aspart [NovoLOG] 2 - 10 unit SUBQ 05/07/21 05/30/21





 0800,1200,1700,2100 #10  


 


Insulin Glargine [Lantus Solostar] 10 unit SUBQ QPM #5 05/07/21 05/30/21


 


Lancets 1 each  QID #200 each 05/07/21 05/30/21


 


Losartan [Cozaar] 100 mg PO DAILY #60 tablet 05/14/21 05/30/21














- Allergies


Allergies/Adverse Reactions: 


                                    Allergies











Allergy/AdvReac Type Severity Reaction Status Date / Time


 


No Known Drug Allergies Allergy   Verified 05/13/21 05:52














- Social History


Does the pt smoke?: No


Smoking Status: Never smoker


Does the pt drink ETOH?: No


Does the pt have substance abuse?: No





- Immunizations


Immunizations are current?: Yes





- POLST


Patient has POLST: No


POLST Status: Full Code





PD ED PE NORMAL





- Vitals


Vital signs reviewed: Yes





- General


General: Well developed/nourished





- HEENT


HEENT: Moist mucous membranes





- Neck


Neck: Supple, no meningeal sign, No JVD





- Cardiac


Cardiac: RRR, No murmur





- Abdomen


Abdomen: Soft, Non tender





- Derm


Derm: Normal color, Warm and dry





PD ED PE EXPANDED





- Respiratory


Respiratory: Distress (moderate respiratory distress, answers some ROS/HPI 

questions with nod/shake of head; tachypneic), Rales (bilateral, 1/2 up from 

bases), Decreased breath sounds





- Extremities


Extremities: Pedal edema bilateral (1+ bilateral pitting)





Results





- Vitals


Vitals: 


                               Vital Signs - 24 hr











  05/30/21 05/30/21 05/30/21





  04:55 05:21 05:34


 


Temperature 36.4 C L  


 


Heart Rate 100 92 95


 


Respiratory 32 H 26 H 18





Rate   


 


Blood Pressure 210/103 H 195/105 H 194/100 H


 


O2 Saturation 96 98 15 L














  05/30/21 05/30/21 05/30/21





  06:05 06:09 06:11


 


Temperature   


 


Heart Rate 85 87 84


 


Respiratory  22 26 H





Rate   


 


Blood Pressure   152/79 H


 


O2 Saturation   99














  05/30/21 05/30/21 05/30/21





  06:26 06:47 06:51


 


Temperature  36.4 C L 


 


Heart Rate 80 82 82


 


Respiratory 17 92 H 45 H





Rate   


 


Blood Pressure  117/87 H 150/81 H


 


O2 Saturation  99 100














  05/30/21





  07:05


 


Temperature 


 


Heart Rate 80


 


Respiratory 





Rate 


 


Blood Pressure 


 


O2 Saturation 








                                     Oxygen











O2 Source                      BIPAP


 


Oxygen Flow Rate               3

















- EKG (time done)


  ** No standard instances


Rate: Rate (enter#) (85)


Rhythm: NSR


Axis: Normal


Intervals: Normal CA


QRS: Normal


Ischemia: Normal ST segments





- Labs


Labs: 


                                Laboratory Tests











  05/30/21 05/30/21 05/30/21





  05:09 05:29 05:29


 


WBC   9.0 


 


RBC   3.62 L 


 


Hgb   10.9 L 


 


Hct   34.6 L 


 


MCV   95.6 


 


MCH   30.1 


 


MCHC   31.5 L 


 


RDW   15.2 H 


 


Plt Count   279 


 


MPV   10.1 


 


Neut # (Auto)   5.9 


 


Lymph # (Auto)   2.0 


 


Mono # (Auto)   0.6 


 


Eos # (Auto)   0.4 


 


Baso # (Auto)   0.1 


 


Absolute Nucleated RBC   0.02 


 


Nucleated RBC %   0.2 


 


Sodium    139


 


Potassium    3.6


 


Chloride    101


 


Carbon Dioxide    28


 


Anion Gap    10.0


 


BUN    15


 


Creatinine    0.7


 


Estimated GFR (MDRD)    105


 


Glucose    211 H


 


POC Whole Bld Glucose  208 H  


 


Lactic Acid   


 


Calcium    9.3


 


Total Bilirubin    0.8


 


AST    29


 


ALT    37


 


Alkaline Phosphatase    88


 


Troponin I High Sens   


 


B-Natriuretic Peptide   


 


Total Protein    7.8


 


Albumin    3.5


 


Globulin    4.3 H


 


Albumin/Globulin Ratio    0.8 L


 


Lipase    21 L


 


Nasal Adenovirus (PCR)   


 


Nasal B. parapertussis DNA (PCR)   


 


Nasal Coronavir 229E PCR   


 


Nasal Coronavir HKU1 PCR   


 


Nasal Coronavir NL63 PCR   


 


Nasal Coronavir OC43 PCR   


 


Nasal Enterovir/Rhinovir PCR   


 


Nasal Influenza B PCR   


 


Nasal Influenza A PCR   


 


Nasal Parainfluen 1 PCR   


 


Nasal Parainfluen 2 PCR   


 


Nasal Parainfluen 3 PCR   


 


Nasal Parainfluen 4 PCR   


 


Nasal RSV (PCR)   


 


Nasal B.pertussis DNA PCR   


 


Nasal C.pneumoniae (PCR)   


 


Khanh Human Metapneumo PCR   


 


Nasal M.pneumoniae (PCR)   


 


Nasal SARS-CoV-2 (PCR)   














  05/30/21 05/30/21 05/30/21





  05:29 05:29 06:17


 


WBC   


 


RBC   


 


Hgb   


 


Hct   


 


MCV   


 


MCH   


 


MCHC   


 


RDW   


 


Plt Count   


 


MPV   


 


Neut # (Auto)   


 


Lymph # (Auto)   


 


Mono # (Auto)   


 


Eos # (Auto)   


 


Baso # (Auto)   


 


Absolute Nucleated RBC   


 


Nucleated RBC %   


 


Sodium   


 


Potassium   


 


Chloride   


 


Carbon Dioxide   


 


Anion Gap   


 


BUN   


 


Creatinine   


 


Estimated GFR (MDRD)   


 


Glucose   


 


POC Whole Bld Glucose   


 


Lactic Acid   


 


Calcium   


 


Total Bilirubin   


 


AST   


 


ALT   


 


Alkaline Phosphatase   


 


Troponin I High Sens  6.3  


 


B-Natriuretic Peptide   128 H 


 


Total Protein   


 


Albumin   


 


Globulin   


 


Albumin/Globulin Ratio   


 


Lipase   


 


Nasal Adenovirus (PCR)    NOT DETECTED


 


Nasal B. parapertussis DNA (PCR)    NOT DETECTED


 


Nasal Coronavir 229E PCR    NOT DETECTED


 


Nasal Coronavir HKU1 PCR    NOT DETECTED


 


Nasal Coronavir NL63 PCR    NOT DETECTED


 


Nasal Coronavir OC43 PCR    NOT DETECTED


 


Nasal Enterovir/Rhinovir PCR    NOT DETECTED


 


Nasal Influenza B PCR    NOT DETECTED


 


Nasal Influenza A PCR    NOT DETECTED


 


Nasal Parainfluen 1 PCR    NOT DETECTED


 


Nasal Parainfluen 2 PCR    NOT DETECTED


 


Nasal Parainfluen 3 PCR    NOT DETECTED


 


Nasal Parainfluen 4 PCR    NOT DETECTED


 


Nasal RSV (PCR)    NOT DETECTED


 


Nasal B.pertussis DNA PCR    NOT DETECTED


 


Nasal C.pneumoniae (PCR)    NOT DETECTED


 


Khanh Human Metapneumo PCR    NOT DETECTED


 


Nasal M.pneumoniae (PCR)    NOT DETECTED


 


Nasal SARS-CoV-2 (PCR)    NOT DETECTED














  05/30/21





  06:18


 


WBC 


 


RBC 


 


Hgb 


 


Hct 


 


MCV 


 


MCH 


 


MCHC 


 


RDW 


 


Plt Count 


 


MPV 


 


Neut # (Auto) 


 


Lymph # (Auto) 


 


Mono # (Auto) 


 


Eos # (Auto) 


 


Baso # (Auto) 


 


Absolute Nucleated RBC 


 


Nucleated RBC % 


 


Sodium 


 


Potassium 


 


Chloride 


 


Carbon Dioxide 


 


Anion Gap 


 


BUN 


 


Creatinine 


 


Estimated GFR (MDRD) 


 


Glucose 


 


POC Whole Bld Glucose 


 


Lactic Acid  1.6


 


Calcium 


 


Total Bilirubin 


 


AST 


 


ALT 


 


Alkaline Phosphatase 


 


Troponin I High Sens 


 


B-Natriuretic Peptide 


 


Total Protein 


 


Albumin 


 


Globulin 


 


Albumin/Globulin Ratio 


 


Lipase 


 


Nasal Adenovirus (PCR) 


 


Nasal B. parapertussis DNA (PCR) 


 


Nasal Coronavir 229E PCR 


 


Nasal Coronavir HKU1 PCR 


 


Nasal Coronavir NL63 PCR 


 


Nasal Coronavir OC43 PCR 


 


Nasal Enterovir/Rhinovir PCR 


 


Nasal Influenza B PCR 


 


Nasal Influenza A PCR 


 


Nasal Parainfluen 1 PCR 


 


Nasal Parainfluen 2 PCR 


 


Nasal Parainfluen 3 PCR 


 


Nasal Parainfluen 4 PCR 


 


Nasal RSV (PCR) 


 


Nasal B.pertussis DNA PCR 


 


Nasal C.pneumoniae (PCR) 


 


Khanh Human Metapneumo PCR 


 


Nasal M.pneumoniae (PCR) 


 


Nasal SARS-CoV-2 (PCR) 














- Rads (name of study)


  ** cxr


Radiology: Prelim report reviewed, See rad report





PD MEDICAL DECISION MAKING





- ED course


Complexity details: reviewed old records, reviewed results, re-evaluated 

patient, considered differential, d/w patient, d/w family


ED course: 





patient presents with obvious respiratory distress, tachypneic with decreased 

breath sounds bilaterally and rales half-way up from both bases. She has had 

three [facility] admissions over past 5 weeks for respiratory problems including 

pneumonia. She had an echo last month with EF 65-70%. Patient's  says he 

is unaware of any h/o asthma or COPD. She shows gradual improvement after duoneb

 and then albuterol neb, IV lasix, and bipap. d/w Dr. Luna, will admit to 

[facility]. CXR interpreted by radiologist as multilobar pneumonia. Given patient's 

recent hospital stay, will cover with IV vancomycin and cefepime. 





Departure





- Departure


Disposition: [facility] DC/Xfer


Clinical Impression: 


Pneumonia


Qualifiers:


 Pneumonia type: due to unspecified organism Laterality: bilateral Lung 

location: unspecified part of lung Qualified Code(s): J18.9 - Pneumonia, 

unspecified organism





Condition: Stable

## 2021-05-31 LAB
ANION GAP SERPL CALCULATED.4IONS-SCNC: 11 MMOL/L (ref 6–13)
BASOPHILS NFR BLD AUTO: 0 10^3/UL (ref 0–0.1)
BASOPHILS NFR BLD AUTO: 0.2 %
BUN SERPL-MCNC: 16 MG/DL (ref 6–20)
CALCIUM UR-MCNC: 8.8 MG/DL (ref 8.5–10.3)
CHLORIDE SERPL-SCNC: 100 MMOL/L (ref 101–111)
CO2 SERPL-SCNC: 26 MMOL/L (ref 21–32)
CREAT SERPLBLD-SCNC: 0.8 MG/DL (ref 0.4–1)
EOSINOPHIL # BLD AUTO: 0.2 10^3/UL (ref 0–0.7)
EOSINOPHIL NFR BLD AUTO: 1.9 %
ERYTHROCYTE [DISTWIDTH] IN BLOOD BY AUTOMATED COUNT: 15.6 % (ref 12–15)
EST. AVERAGE GLUCOSE BLD GHB EST-MCNC: 237 MG/DL (ref 70–100)
GFRSERPLBLD MDRD-ARVRAT: 90 ML/MIN/{1.73_M2} (ref 89–?)
GLUCOSE SERPL-MCNC: 358 MG/DL (ref 70–100)
HBA1C MFR BLD HPLC: 9.9 % (ref 4.27–6.07)
HCT VFR BLD AUTO: 32.9 % (ref 37–47)
HGB UR QL STRIP: 10.1 G/DL (ref 12–16)
LYMPHOCYTES # SPEC AUTO: 1 10^3/UL (ref 1.5–3.5)
LYMPHOCYTES NFR BLD AUTO: 10.6 %
MAGNESIUM SERPL-MCNC: 1.8 MG/DL (ref 1.7–2.8)
MCH RBC QN AUTO: 29.3 PG (ref 27–31)
MCHC RBC AUTO-ENTMCNC: 30.7 G/DL (ref 32–36)
MCV RBC AUTO: 95.4 FL (ref 81–99)
MONOCYTES # BLD AUTO: 0.4 10^3/UL (ref 0–1)
MONOCYTES NFR BLD AUTO: 4.8 %
NEUTROPHILS # BLD AUTO: 7.5 10^3/UL (ref 1.5–6.6)
NEUTROPHILS # SNV AUTO: 9.2 X10^3/UL (ref 4.8–10.8)
NEUTROPHILS NFR BLD AUTO: 82.1 %
NRBC # BLD AUTO: 0 /100WBC
NRBC # BLD AUTO: 0 X10^3/UL
PDW BLD AUTO: 10.6 FL (ref 7.9–10.8)
PHOSPHATE BLD-MCNC: 3.8 MG/DL (ref 2.5–4.6)
PLATELET # BLD: 255 10^3/UL (ref 130–450)
POTASSIUM SERPL-SCNC: 3.4 MMOL/L (ref 3.5–5)
RBC MAR: 3.45 10^6/UL (ref 4.2–5.4)
SODIUM SERPLBLD-SCNC: 137 MMOL/L (ref 135–145)

## 2021-05-31 RX ADMIN — INSULIN ASPART SCH UNIT: 100 INJECTION, SOLUTION INTRAVENOUS; SUBCUTANEOUS at 20:35

## 2021-05-31 RX ADMIN — SODIUM CHLORIDE SCH MLS/HR: 900 INJECTION INTRAVENOUS at 22:57

## 2021-05-31 RX ADMIN — INSULIN ASPART SCH UNIT: 100 INJECTION, SOLUTION INTRAVENOUS; SUBCUTANEOUS at 08:57

## 2021-05-31 RX ADMIN — MORPHINE SULFATE PRN MG: 2 INJECTION, SOLUTION INTRAMUSCULAR; INTRAVENOUS at 16:21

## 2021-05-31 RX ADMIN — LOSARTAN POTASSIUM SCH MG: 50 TABLET, FILM COATED ORAL at 12:24

## 2021-05-31 RX ADMIN — GABAPENTIN SCH MG: 400 CAPSULE ORAL at 20:38

## 2021-05-31 RX ADMIN — ALBUTEROL SULFATE PRN MG: 2.5 SOLUTION RESPIRATORY (INHALATION) at 18:32

## 2021-05-31 RX ADMIN — Medication SCH AMP: at 20:39

## 2021-05-31 RX ADMIN — Medication SCH AMP: at 08:57

## 2021-05-31 RX ADMIN — ENOXAPARIN SODIUM SCH MG: 100 INJECTION SUBCUTANEOUS at 08:57

## 2021-05-31 RX ADMIN — INSULIN ASPART SCH UNIT: 100 INJECTION, SOLUTION INTRAVENOUS; SUBCUTANEOUS at 16:52

## 2021-05-31 RX ADMIN — SODIUM CHLORIDE, PRESERVATIVE FREE SCH ML: 5 INJECTION INTRAVENOUS at 08:37

## 2021-05-31 RX ADMIN — HYDRALAZINE HYDROCHLORIDE SCH: 20 INJECTION INTRAMUSCULAR; INTRAVENOUS at 22:33

## 2021-05-31 RX ADMIN — ONDANSETRON PRN MG: 2 INJECTION INTRAMUSCULAR; INTRAVENOUS at 15:52

## 2021-05-31 RX ADMIN — INSULIN ASPART SCH UNIT: 100 INJECTION, SOLUTION INTRAVENOUS; SUBCUTANEOUS at 12:31

## 2021-05-31 RX ADMIN — ONDANSETRON PRN MG: 2 INJECTION INTRAMUSCULAR; INTRAVENOUS at 08:37

## 2021-05-31 RX ADMIN — GABAPENTIN SCH MG: 300 CAPSULE ORAL at 12:24

## 2021-05-31 RX ADMIN — SODIUM CHLORIDE SCH MLS/HR: 900 INJECTION INTRAVENOUS at 15:19

## 2021-05-31 RX ADMIN — GABAPENTIN SCH MG: 400 CAPSULE ORAL at 09:32

## 2021-05-31 RX ADMIN — MORPHINE SULFATE PRN MG: 2 INJECTION, SOLUTION INTRAMUSCULAR; INTRAVENOUS at 12:24

## 2021-05-31 RX ADMIN — SODIUM CHLORIDE SCH MLS/HR: 900 INJECTION INTRAVENOUS at 05:13

## 2021-05-31 RX ADMIN — INSULIN ASPART SCH: 100 INJECTION, SOLUTION INTRAVENOUS; SUBCUTANEOUS at 12:34

## 2021-05-31 RX ADMIN — SODIUM CHLORIDE, PRESERVATIVE FREE SCH ML: 5 INJECTION INTRAVENOUS at 15:53

## 2021-05-31 RX ADMIN — SODIUM CHLORIDE SCH MLS/HR: 9 INJECTION, SOLUTION INTRAVENOUS at 08:57

## 2021-05-31 RX ADMIN — SODIUM CHLORIDE SCH MLS/HR: 9 INJECTION, SOLUTION INTRAVENOUS at 21:10

## 2021-05-31 RX ADMIN — INSULIN GLARGINE SCH UNIT: 100 INJECTION, SOLUTION SUBCUTANEOUS at 20:36

## 2021-05-31 RX ADMIN — SODIUM CHLORIDE, PRESERVATIVE FREE SCH ML: 5 INJECTION INTRAVENOUS at 01:24

## 2021-05-31 RX ADMIN — INSULIN ASPART SCH: 100 INJECTION, SOLUTION INTRAVENOUS; SUBCUTANEOUS at 08:52

## 2021-05-31 RX ADMIN — INSULIN ASPART SCH: 100 INJECTION, SOLUTION INTRAVENOUS; SUBCUTANEOUS at 17:21

## 2021-05-31 RX ADMIN — ATORVASTATIN CALCIUM SCH MG: 10 TABLET, FILM COATED ORAL at 21:10

## 2021-05-31 RX ADMIN — HYDRALAZINE HYDROCHLORIDE SCH: 20 INJECTION INTRAMUSCULAR; INTRAVENOUS at 15:11

## 2021-05-31 RX ADMIN — METOCLOPRAMIDE PRN MG: 5 INJECTION, SOLUTION INTRAMUSCULAR; INTRAVENOUS at 18:31

## 2021-05-31 RX ADMIN — HYDRALAZINE HYDROCHLORIDE SCH MG: 20 INJECTION INTRAMUSCULAR; INTRAVENOUS at 05:06

## 2021-05-31 RX ADMIN — GLIMEPIRIDE SCH MG: 2 TABLET ORAL at 12:27

## 2021-05-31 NOTE — PROVIDER PROGRESS NOTE
Progress Note


May 31, 2021


3:40 PM





She is miserable.  We had her blood pressure under control last night, she is on

antibiotics for pneumonia, and this morning she had emesis of her breakfast.  

From that point on she is just been miserable with nausea, profound fatigue, and

a terrible headache.  Headache is global, a 7 out of a 10.  Is been nonstop.  It

does not affect vision, speech, strength.  But she just wants the lights out and

to sleep.  She did tolerate a little bit of lunch.





Yesterday her glucose is 123, 131, 288.  Today glucose has been 249, 255, and 

226.  She threw up her Amaryl so did not get that.  She is getting 5 units 3 

times daily with meals but it was not given at lunch because she was not eating.

 She is also getting Lantus 10 units in the evening.





Blood pressure is in the 150s to 160s systolic.  Just now when I woke her up her

blood pressure was 128/77.  She denies chest pain, shortness of breath.





Medications are Tylenol as needed, nose and, Lipitor, cefepime, vancomycin, 

Lovenox subcu for DVT, Neurontin 3 times a day, Amaryl daily, hydralazine 10 3 

times daily with parameters, Lantus, subcu insulin before meals as well as 

sliding scale, losartan 100, morphine IV as needed, Zofran, oxycodone as needed.

 She received 1 dose of potassium this morning for a potassium level of 3.4.





Temperature is 37.5.  Heart rates 91.  Blood pressure 128/77.  Respirations 15. 

95% on 1 L.  On room air she is 86%.


Fatigued appearing lethargic black female who is still oriented to person place 

and time.


Neck is supple


Lungs are with fine crackles at the bases, overall diminished breath sounds.  

But no acute respiratory distress, no use of accessory muscles.


Regular rate and rhythm.  She has been consistently tachycardic yesterday and 

today.  By this afternoon she is now 91.


Abdomen is soft, nontender, last BM was yesterday and she is having flatus.


Extremities have minimal, trace edema.  Yesterday she had 1-2+ edema.





Assessment/plan


1.  Pneumonia


2.  Acute heart failure with preserved ejection fraction


3.  Type 2 diabetes mellitus, uncontrolled, with complications of peripheral 

neuropathy


4.  Uncontrolled hypertension resolved.  Controlled now.


5.  Anemia stable.  Previous history of iron deficiency anemia.


6.  Headache.





She is on day 2 of antibiotics.  So far blood culture is negative after 1 day.  

White cell count remains normal.  She still hypoxic but that would be more due 

to the heart failure than the pneumonia.  I would think about stopping 

antibiotics tomorrow if she continues to have no fever or elevated white cell 

count.





Blood pressure is now controlled.  With 1 dose of Lasix yesterday her urine 

output was 4625 cc.  Today her output without Lasix is 1575 cc.  If she goes 

home with diuretic therapy, she may need something as low as 20 mg every other 

day.  I still strongly recommend that she be seen by cardiology in the 

outpatient setting for a review of her echo, hypertension, and control.





For her diabetes I will increase her Lantus to 14 units at night.  I would like 

her glucose to be in the mid 100 range.  We stopped giving her her fixed dose of

insulin with meals since she did not eat lunch.  She is still on sliding scale.





Headache is her most current complaint.  She is absolutely miserable with it.  

She has had Tylenol, but she is declining morphine at this time.  She does not 

want to take oxycodone because she did take it this morning and it just caused 

her to vomit.  Continue to monitor and offer pain medication relief.

## 2021-06-01 LAB
ANION GAP SERPL CALCULATED.4IONS-SCNC: 12 MMOL/L (ref 6–13)
BASOPHILS NFR BLD AUTO: 0 10^3/UL (ref 0–0.1)
BASOPHILS NFR BLD AUTO: 0.4 %
BUN SERPL-MCNC: 14 MG/DL (ref 6–20)
CALCIUM UR-MCNC: 9.1 MG/DL (ref 8.5–10.3)
CHLORIDE SERPL-SCNC: 102 MMOL/L (ref 101–111)
CO2 SERPL-SCNC: 27 MMOL/L (ref 21–32)
CREAT SERPLBLD-SCNC: 0.7 MG/DL (ref 0.4–1)
EOSINOPHIL # BLD AUTO: 0.5 10^3/UL (ref 0–0.7)
EOSINOPHIL NFR BLD AUTO: 6 %
ERYTHROCYTE [DISTWIDTH] IN BLOOD BY AUTOMATED COUNT: 15.5 % (ref 12–15)
GFRSERPLBLD MDRD-ARVRAT: 105 ML/MIN/{1.73_M2} (ref 89–?)
GLUCOSE SERPL-MCNC: 107 MG/DL (ref 70–100)
HCT VFR BLD AUTO: 33.3 % (ref 37–47)
HGB UR QL STRIP: 10.2 G/DL (ref 12–16)
LYMPHOCYTES # SPEC AUTO: 2.2 10^3/UL (ref 1.5–3.5)
LYMPHOCYTES NFR BLD AUTO: 27 %
MAGNESIUM SERPL-MCNC: 2 MG/DL (ref 1.7–2.8)
MCH RBC QN AUTO: 28.9 PG (ref 27–31)
MCHC RBC AUTO-ENTMCNC: 30.6 G/DL (ref 32–36)
MCV RBC AUTO: 94.3 FL (ref 81–99)
MONOCYTES # BLD AUTO: 0.7 10^3/UL (ref 0–1)
MONOCYTES NFR BLD AUTO: 9 %
NEUTROPHILS # BLD AUTO: 4.7 10^3/UL (ref 1.5–6.6)
NEUTROPHILS # SNV AUTO: 8.2 X10^3/UL (ref 4.8–10.8)
NEUTROPHILS NFR BLD AUTO: 57.4 %
NRBC # BLD AUTO: 0 /100WBC
NRBC # BLD AUTO: 0 X10^3/UL
PDW BLD AUTO: 9.6 FL (ref 7.9–10.8)
PHOSPHATE BLD-MCNC: 3.9 MG/DL (ref 2.5–4.6)
PLATELET # BLD: 254 10^3/UL (ref 130–450)
POTASSIUM SERPL-SCNC: 3.3 MMOL/L (ref 3.5–5)
RBC MAR: 3.53 10^6/UL (ref 4.2–5.4)
SODIUM SERPLBLD-SCNC: 141 MMOL/L (ref 135–145)

## 2021-06-01 RX ADMIN — GABAPENTIN SCH MG: 300 CAPSULE ORAL at 12:04

## 2021-06-01 RX ADMIN — INSULIN ASPART SCH UNIT: 100 INJECTION, SOLUTION INTRAVENOUS; SUBCUTANEOUS at 21:24

## 2021-06-01 RX ADMIN — INSULIN GLARGINE SCH UNIT: 100 INJECTION, SOLUTION SUBCUTANEOUS at 21:25

## 2021-06-01 RX ADMIN — ALBUTEROL SULFATE PRN MG: 2.5 SOLUTION RESPIRATORY (INHALATION) at 14:33

## 2021-06-01 RX ADMIN — Medication SCH AMP: at 08:16

## 2021-06-01 RX ADMIN — GABAPENTIN SCH MG: 400 CAPSULE ORAL at 20:57

## 2021-06-01 RX ADMIN — SODIUM CHLORIDE, PRESERVATIVE FREE SCH ML: 5 INJECTION INTRAVENOUS at 08:16

## 2021-06-01 RX ADMIN — HYDRALAZINE HYDROCHLORIDE SCH: 20 INJECTION INTRAMUSCULAR; INTRAVENOUS at 22:00

## 2021-06-01 RX ADMIN — GLIMEPIRIDE SCH MG: 2 TABLET ORAL at 08:52

## 2021-06-01 RX ADMIN — SODIUM CHLORIDE SCH MLS/HR: 900 INJECTION INTRAVENOUS at 06:45

## 2021-06-01 RX ADMIN — SODIUM CHLORIDE SCH: 9 INJECTION, SOLUTION INTRAVENOUS at 10:34

## 2021-06-01 RX ADMIN — INSULIN ASPART SCH UNIT: 100 INJECTION, SOLUTION INTRAVENOUS; SUBCUTANEOUS at 12:06

## 2021-06-01 RX ADMIN — GABAPENTIN SCH MG: 400 CAPSULE ORAL at 08:15

## 2021-06-01 RX ADMIN — INSULIN ASPART SCH UNIT: 100 INJECTION, SOLUTION INTRAVENOUS; SUBCUTANEOUS at 12:07

## 2021-06-01 RX ADMIN — HYDRALAZINE HYDROCHLORIDE SCH: 20 INJECTION INTRAMUSCULAR; INTRAVENOUS at 07:42

## 2021-06-01 RX ADMIN — INSULIN ASPART SCH: 100 INJECTION, SOLUTION INTRAVENOUS; SUBCUTANEOUS at 08:15

## 2021-06-01 RX ADMIN — METOCLOPRAMIDE PRN MG: 5 INJECTION, SOLUTION INTRAMUSCULAR; INTRAVENOUS at 08:11

## 2021-06-01 RX ADMIN — ENOXAPARIN SODIUM SCH MG: 100 INJECTION SUBCUTANEOUS at 08:52

## 2021-06-01 RX ADMIN — ALBUTEROL SULFATE PRN MG: 2.5 SOLUTION RESPIRATORY (INHALATION) at 08:31

## 2021-06-01 RX ADMIN — SODIUM CHLORIDE, PRESERVATIVE FREE SCH ML: 5 INJECTION INTRAVENOUS at 15:19

## 2021-06-01 RX ADMIN — INSULIN ASPART SCH: 100 INJECTION, SOLUTION INTRAVENOUS; SUBCUTANEOUS at 08:14

## 2021-06-01 RX ADMIN — INSULIN ASPART SCH UNIT: 100 INJECTION, SOLUTION INTRAVENOUS; SUBCUTANEOUS at 16:50

## 2021-06-01 RX ADMIN — ATORVASTATIN CALCIUM SCH MG: 10 TABLET, FILM COATED ORAL at 20:56

## 2021-06-01 RX ADMIN — LOSARTAN POTASSIUM SCH MG: 50 TABLET, FILM COATED ORAL at 08:15

## 2021-06-01 RX ADMIN — Medication SCH CAP: at 15:08

## 2021-06-01 RX ADMIN — SODIUM CHLORIDE, PRESERVATIVE FREE SCH ML: 5 INJECTION INTRAVENOUS at 06:46

## 2021-06-01 RX ADMIN — Medication SCH AMP: at 20:57

## 2021-06-01 RX ADMIN — HYDRALAZINE HYDROCHLORIDE SCH MG: 20 INJECTION INTRAMUSCULAR; INTRAVENOUS at 15:07

## 2021-06-01 RX ADMIN — INSULIN ASPART SCH UNIT: 100 INJECTION, SOLUTION INTRAVENOUS; SUBCUTANEOUS at 16:49

## 2021-06-01 NOTE — PROVIDER PROGRESS NOTE
Assessment/Plan





- Problem List


(1) Pneumonia


Qualifiers: 


   Pneumonia type: due to unspecified organism   Laterality: bilateral   Lung 

location: unspecified part of lung   Qualified Code(s): J18.9 - Pneumonia, 

unspecified organism   


Assessment/Plan: 


Patient has been afebrile for the past 2 days and white blood cell count has 

been normal.


Blood cultures are no growth to date.


Consequently vancomycin and cefepime been discontinued.


Suspect patient's hypoxia is likely due to fluid with CHF.


Patient was given Lasix 20 mg IV x1.


We will continue to monitor.








(2) Acute heart failure with preserved ejection fraction


Assessment/Plan: 


Patient had a 2D echocardiogram done on 4/23/2021 which showed an ejection 

fraction of 60 to 65%.


She still has some mild crackles on auscultation.


Lasix 20mg IV X1 ordered


Pt advised to discontinue actos upon discharge


She is to follow up with cardiology outpatient








(3) Diabetes mellitus type 2, uncontrolled, with complications


Assessment/Plan: 


On Lantus 10 units every afternoon.


Low dose sliding scale insulin.


Glimepiride 2 mg p.o. daily with meals.


Patient's hemoglobin A1c on 04/22/21 was 12.1.  Today it is 9.9.


We will continue current regimen.


She was on Metformin which was discontinued during her last visit due to 

unexplained lactic acidosis.








(4) Uncontrolled hypertension


Assessment/Plan: 


On losartan 100 mg p.o. daily.


Hydralazine 10 mg IV 3 times daily.


Patient given Lasix 20 mg IV x1 today.








(5) Anemia


Qualifiers: 


   Anemia type: iron deficiency 


Assessment/Plan: 


Stable.


Hemoglobin has been around 10 throughout this hospital stay.








(6) Headache


Assessment/Plan: 


Improved. Pain management with Tylenol, oxycodone and all morphine as needed








- Current Meds


Current Meds: 





                               Current Medications











Generic Name Dose Route Start Last Admin





  Trade Name Freq  PRN Reason Stop Dose Admin


 


Acetaminophen  650 mg  05/30/21 07:23  05/30/21 18:58





  Acetaminophen 325 Mg Tablet  PO   650 mg





  Q4HR PRN   Administration





  Pain 1 to 4  


 


Albuterol  2.5 mg  05/31/21 18:26  06/01/21 08:31





  Albuterol Neb 2.5 Mg/3 Ml  INH   2.5 mg





  RTQ4H PRN   Administration





  Wheezing  


 


Alcohol  1 amp  05/30/21 21:00  06/01/21 08:16





  Ethyl Alcohol 62% Swab Ampule  LENKA   1 amp





  BID [facility]   Administration


 


Atorvastatin Calcium  20 mg  05/30/21 21:00  05/31/21 21:10





  Atorvastatin 10 Mg Tablet  PO   20 mg





  QPM [facility]   Administration


 


Enoxaparin Sodium  40 mg  05/30/21 09:00  05/31/21 08:57





  Enoxaparin 40 Mg/0.4 Ml Syringe  SUBQ   40 mg





  DAILY DK   Administration


 


Gabapentin  1,200 mg  05/30/21 21:00  06/01/21 08:15





  Gabapentin 400 Mg Capsule  PO   1,200 mg





  BID [facility]   Administration


 


Gabapentin  600 mg  05/30/21 12:00  05/31/21 12:24





  Gabapentin 300 Mg Capsule  PO   600 mg





  1200 [facility]   Administration


 


Glimepiride  2 mg  05/31/21 08:00  05/31/21 12:27





  Glimepiride 2 Mg Tablet  PO   2 mg





  DAILYWM [facility]   Administration


 


Hydralazine HCl  10 mg  05/30/21 14:00  06/01/21 07:42





  Hydralazine Inj 20 Mg/Ml Vial  IVP   Not Given





  TID [facility]  


 


Vancomycin HCl 1 gm/  250 mls @ 166.667 mls/hr  05/31/21 09:00  05/31/21 22:55





Vancomycin HCl 250 mg/ Sodium  IV   Infused





Chloride  Q12H [facility]   Infusion


 


Insulin Aspart  5 unit  05/30/21 17:00  06/01/21 08:15





  Insulin Aspart 300 Unit/3 Ml Pen  SUBQ   Not Given





  TIDWM [facility]  





  Protocol  


 


Insulin Aspart  1 - 5 unit  05/30/21 17:00  06/01/21 08:14





  Insulin Aspart 300 Unit/3 Ml Pen  SUBQ   Not Given





  0800,1200,1700,2100 [facility]  





  Protocol  


 


Insulin Glargine  10 unit  05/30/21 21:00  05/31/21 20:36





  Insulin Glargine 300 Unit/3 Ml Pen  SUBQ   300 unit





  QPM [facility]   Administration


 


Losartan Potassium  100 mg  05/30/21 12:00  06/01/21 08:15





  Losartan 50 Mg Tablet  PO   100 mg





  DAILY [facility]   Administration


 


Metoclopramide HCl  5 mg  05/31/21 17:56  06/01/21 08:11





  Metoclopramide 10 Mg/2 Ml Vial  IVP   5 mg





  Q6HR PRN   Administration





  Nausea / Vomiting  


 


Morphine Sulfate  1 mg  05/30/21 21:51  05/31/21 16:21





  Morphine 2 Mg/Ml Carpuject  IVP   1 mg





  Q4HR PRN   Administration





  PAIN  


 


Ondansetron HCl  4 mg  05/30/21 07:23  05/31/21 15:52





  Ondansetron 4 Mg/2 Ml Vial  IVP   4 mg





  Q6HR PRN   Administration





  Nausea / Vomiting  


 


Oxycodone HCl  5 mg  05/30/21 21:51  05/30/21 22:20





  Oxycodone 5 Mg Tablet  PO   5 mg





  Q4HR PRN   Administration





  PAIN  


 


Sodium Chloride  10 ml  05/30/21 09:00  06/01/21 08:16





  Sodium Chloride Flush 0.9% 10 Ml Syringe  IVP   10 ml





  0100,0900,1700 [facility]   Administration














- Lab Result


Fish Bone Diagrams: 


                                 06/01/21 04:35





                                 06/01/21 04:35





- Additional Planning


My Orders: 





My Active Orders





06/01/21 08:35


FUROSEMIDE INJ 20mg VIAL [LASIX INJ 20mg VIAL]   20 mg IVP ONCE STA 














Subjective





- Subjective


Patient Reports: Other (Patient was resting comfortably in bed.  She reported 

significant improvement in her headache.  She rated the pain 1 out of 10 scale. 

She has mild crackles but no conversational dyspnea, no lower extremity edema or

JVD.  Off supplemental oxygen her oxygen saturation drop to the 80's)





Objective


Vital Signs: 





                               Vital Signs - 24 hr











  05/31/21 05/31/21 05/31/21





  09:00 10:00 11:00


 


Temperature   


 


Heart Rate   


 


Heart Rate [ 107 H 100 96





Monitoring   





electrodes]   


 


Respiratory 20 15 15





Rate   


 


Blood Pressure   


 


Blood Pressure 131/89 H 175/81 H 141/74 H





[Right Brachial   





artery]   


 


O2 Saturation 94 97 96














  05/31/21 05/31/21 05/31/21





  11:41 12:00 13:00


 


Temperature 37.5 C  


 


Heart Rate   


 


Heart Rate [  104 H 100





Monitoring   





electrodes]   


 


Respiratory  18 19





Rate   


 


Blood Pressure   


 


Blood Pressure  155/74 H 155/74 H





[Right Brachial   





artery]   


 


O2 Saturation  97 97














  05/31/21 05/31/21 05/31/21





  14:00 15:00 15:50


 


Temperature   37.2 C


 


Heart Rate   


 


Heart Rate [ 95 91 





Monitoring   





electrodes]   


 


Respiratory 21 15 





Rate   


 


Blood Pressure   


 


Blood Pressure 167/86 H 128/77 





[Right Brachial   





artery]   


 


O2 Saturation 94 95 














  05/31/21 05/31/21 05/31/21





  16:00 17:00 18:00


 


Temperature 37.4 C  


 


Heart Rate   


 


Heart Rate [ 93 94 93





Monitoring   





electrodes]   


 


Respiratory 19 17 17





Rate   


 


Blood Pressure   


 


Blood Pressure 147/72 H 157/80 H 162/92 H





[Right Brachial   





artery]   


 


O2 Saturation 96 97 95














  05/31/21 05/31/21 05/31/21





  18:30 19:00 20:00


 


Temperature   


 


Heart Rate 101 H  


 


Heart Rate [  91 92





Monitoring   





electrodes]   


 


Respiratory 32 H 16 14





Rate   


 


Blood Pressure   


 


Blood Pressure  142/74 H 122/66





[Right Brachial   





artery]   


 


O2 Saturation  95 95














  05/31/21 05/31/21 05/31/21





  21:00 22:00 22:33


 


Temperature 37.2 C  


 


Heart Rate   


 


Heart Rate [ 89 91 





Monitoring   





electrodes]   


 


Respiratory 26 H 26 H 





Rate   


 


Blood Pressure   154/90 H


 


Blood Pressure 149/79 H 154/90 H 





[Right Brachial   





artery]   


 


O2 Saturation 95 95 














  05/31/21 06/01/21 06/01/21





  23:00 00:00 01:00


 


Temperature  37.1 C 


 


Heart Rate   


 


Heart Rate [ 87 86 86





Monitoring   





electrodes]   


 


Respiratory 14 13 18





Rate   


 


Blood Pressure   


 


Blood Pressure 149/80 H 125/68 118/62





[Right Brachial   





artery]   


 


O2 Saturation 95 96 95














  06/01/21 06/01/21 06/01/21





  02:00 03:00 04:00


 


Temperature   37.0 C


 


Heart Rate   


 


Heart Rate [ 80 81 77





Monitoring   





electrodes]   


 


Respiratory 14 13 13





Rate   


 


Blood Pressure   


 


Blood Pressure 131/69 H 151/81 H 119/71





[Right Brachial   





artery]   


 


O2 Saturation 95 99 95














  06/01/21 06/01/21





  05:00 06:00


 


Temperature  


 


Heart Rate  


 


Heart Rate [ 83 80





Monitoring  





electrodes]  


 


Respiratory 8 L 12





Rate  


 


Blood Pressure  


 


Blood Pressure 167/84 H 125/71





[Right Brachial  





artery]  


 


O2 Saturation 97 94








                                     Oxygen











O2 Source                      Nasal cannula


 


Oxygen Flow Rate               3














I&O (Last 24 Hrs): 





                          Intake and Output Totals x24h











 05/30/21 05/31/21 06/01/21





 23:59 23:59 23:59


 


Intake Total 900 2010 655


 


Output Total 4614 2175 


 


Balance -6795 -165 655











General: Alert, Oriented x3, Mild distress (headache)


HEENT: PERRLA, EOMI


Neck: Supple, No JVD


Neuro: Alert, Non Focal, Oriented Times 3


Cardiovascular: Regular rate, No murmurs


Respiratory: Chest non-tender, Other (mild crackles)


Abdomen: Normal bowel sounds, Soft, No tenderness


Extremities: No clubbing, No cyanosis, No edema


Skin: No rashes, No breakdown





- Results


Results: 





                               Laboratory Results











WBC  8.2 x10^3/uL (4.8-10.8)   06/01/21  04:35    


 


RBC  3.53 10^6/uL (4.20-5.40)  L  06/01/21  04:35    


 


Hgb  10.2 g/dL (12.0-16.0)  L  06/01/21  04:35    


 


Hct  33.3 % (37.0-47.0)  L  06/01/21  04:35    


 


MCV  94.3 fL (81.0-99.0)   06/01/21  04:35    


 


MCH  28.9 pg (27.0-31.0)   06/01/21  04:35    


 


MCHC  30.6 g/dL (32.0-36.0)  L  06/01/21  04:35    


 


RDW  15.5 % (12.0-15.0)  H  06/01/21  04:35    


 


Plt Count  254 10^3/uL (130-450)   06/01/21  04:35    


 


MPV  9.6 fL (7.9-10.8)   06/01/21  04:35    


 


Neut # (Auto)  4.7 10^3/uL (1.5-6.6)   06/01/21  04:35    


 


Lymph # (Auto)  2.2 10^3/uL (1.5-3.5)   06/01/21  04:35    


 


Mono # (Auto)  0.7 10^3/uL (0.0-1.0)   06/01/21  04:35    


 


Eos # (Auto)  0.5 10^3/uL (0.0-0.7)   06/01/21  04:35    


 


Baso # (Auto)  0.0 10^3/uL (0.0-0.1)   06/01/21  04:35    


 


Absolute Nucleated RBC  0.00 x10^3/uL  06/01/21  04:35    


 


Nucleated RBC %  0.0 /100WBC  06/01/21  04:35    


 


Sodium  141 mmol/L (135-145)   06/01/21  04:35    


 


Potassium  3.3 mmol/L (3.5-5.0)  L  06/01/21  04:35    


 


Chloride  102 mmol/L (101-111)   06/01/21  04:35    


 


Carbon Dioxide  27 mmol/L (21-32)   06/01/21  04:35    


 


Anion Gap  12.0  (6-13)   06/01/21  04:35    


 


BUN  14 mg/dL (6-20)   06/01/21  04:35    


 


Creatinine  0.7 mg/dL (0.4-1.0)   06/01/21  04:35    


 


Estimated GFR (MDRD)  105  (>89)   06/01/21  04:35    


 


Glucose  107 mg/dL ()  H  06/01/21  04:35    


 


POC Whole Bld Glucose  120 mg/dL (70 - 100)  H  06/01/21  08:02    


 


Estimat Average Glucose  237 mg/dL ()  H  05/31/21  04:42    


 


Hemoglobin A1c %  9.9 % (4.27-6.07)  H  05/31/21  04:42    


 


Lactic Acid  1.6 mmol/L (0.5-2.2)   05/30/21  06:18    


 


Calcium  9.1 mg/dL (8.5-10.3)   06/01/21  04:35    


 


Phosphorus  3.9 mg/dL (2.5-4.6)   06/01/21  04:35    


 


Magnesium  2.0 mg/dL (1.7-2.8)   06/01/21  04:35    


 


Total Bilirubin  0.8 mg/dL (0.2-1.0)   05/30/21  05:29    


 


AST  29 IU/L (10-42)   05/30/21  05:29    


 


ALT  37 IU/L (10-60)   05/30/21  05:29    


 


Alkaline Phosphatase  88 IU/L ()   05/30/21  05:29    


 


Troponin I High Sens  6.3 ng/L (2.3-14.8)   05/30/21  05:29    


 


B-Natriuretic Peptide  128 pg/mL (5-100)  H  05/30/21  05:29    


 


Total Protein  7.8 g/dL (6.7-8.2)   05/30/21  05:29    


 


Albumin  3.5 g/dL (3.2-5.5)   05/30/21  05:29    


 


Globulin  4.3 g/dL (2.1-4.2)  H  05/30/21  05:29    


 


Albumin/Globulin Ratio  0.8  (1.0-2.2)  L  05/30/21  05:29    


 


Lipase  21 U/L (22-51)  L  05/30/21  05:29    


 


Nasal Adenovirus (PCR)  NOT DETECTED   05/30/21  06:17    


 


Nasal B. parapertussis DNA (PCR)  NOT DETECTED   05/30/21  06:17    


 


Nasal Coronavir 229E PCR  NOT DETECTED   05/30/21  06:17    


 


Nasal Coronavir HKU1 PCR  NOT DETECTED   05/30/21  06:17    


 


Nasal Coronavir NL63 PCR  NOT DETECTED   05/30/21  06:17    


 


Nasal Coronavir OC43 PCR  NOT DETECTED   05/30/21  06:17    


 


Nasal Enterovir/Rhinovir PCR  NOT DETECTED   05/30/21  06:17    


 


Nasal Influenza B PCR  NOT DETECTED   05/30/21  06:17    


 


Nasal Influenza A PCR  NOT DETECTED   05/30/21  06:17    


 


Nasal Parainfluen 1 PCR  NOT DETECTED   05/30/21  06:17    


 


Nasal Parainfluen 2 PCR  NOT DETECTED   05/30/21  06:17    


 


Nasal Parainfluen 3 PCR  NOT DETECTED   05/30/21  06:17    


 


Nasal Parainfluen 4 PCR  NOT DETECTED   05/30/21  06:17    


 


Nasal RSV (PCR)  NOT DETECTED   05/30/21  06:17    


 


Nasal Screen MRSA (PCR)  POSITIVE  (NEGATIVE)  A*  05/30/21  09:30    


 


Nasal B.pertussis DNA PCR  NOT DETECTED   05/30/21  06:17    


 


Nasal C.pneumoniae (PCR)  NOT DETECTED   05/30/21  06:17    


 


Lenka Human Metapneumo PCR  NOT DETECTED   05/30/21  06:17    


 


Nasal M.pneumoniae (PCR)  NOT DETECTED   05/30/21  06:17    


 


Nasal SARS-CoV-2 (PCR)  NOT DETECTED   05/30/21  06:17    














ABX Reporting


Has patient been on IV antibiotics over the past 48 hours?: No

## 2021-06-02 VITALS — SYSTOLIC BLOOD PRESSURE: 170 MMHG | DIASTOLIC BLOOD PRESSURE: 94 MMHG

## 2021-06-02 LAB
ANION GAP SERPL CALCULATED.4IONS-SCNC: 10 MMOL/L (ref 6–13)
BASOPHILS NFR BLD AUTO: 0 10^3/UL (ref 0–0.1)
BASOPHILS NFR BLD AUTO: 0.6 %
BUN SERPL-MCNC: 16 MG/DL (ref 6–20)
CALCIUM UR-MCNC: 9.1 MG/DL (ref 8.5–10.3)
CHLORIDE SERPL-SCNC: 101 MMOL/L (ref 101–111)
CO2 SERPL-SCNC: 29 MMOL/L (ref 21–32)
CREAT SERPLBLD-SCNC: 0.6 MG/DL (ref 0.4–1)
EOSINOPHIL # BLD AUTO: 0.5 10^3/UL (ref 0–0.7)
EOSINOPHIL NFR BLD AUTO: 7 %
ERYTHROCYTE [DISTWIDTH] IN BLOOD BY AUTOMATED COUNT: 15.3 % (ref 12–15)
GFRSERPLBLD MDRD-ARVRAT: 125 ML/MIN/{1.73_M2} (ref 89–?)
GLUCOSE SERPL-MCNC: 157 MG/DL (ref 70–100)
HCT VFR BLD AUTO: 33.6 % (ref 37–47)
HGB UR QL STRIP: 10.4 G/DL (ref 12–16)
LYMPHOCYTES # SPEC AUTO: 2 10^3/UL (ref 1.5–3.5)
LYMPHOCYTES NFR BLD AUTO: 29.3 %
MAGNESIUM SERPL-MCNC: 2 MG/DL (ref 1.7–2.8)
MCH RBC QN AUTO: 29.1 PG (ref 27–31)
MCHC RBC AUTO-ENTMCNC: 31 G/DL (ref 32–36)
MCV RBC AUTO: 94.1 FL (ref 81–99)
MONOCYTES # BLD AUTO: 0.7 10^3/UL (ref 0–1)
MONOCYTES NFR BLD AUTO: 10 %
NEUTROPHILS # BLD AUTO: 3.7 10^3/UL (ref 1.5–6.6)
NEUTROPHILS # SNV AUTO: 7 X10^3/UL (ref 4.8–10.8)
NEUTROPHILS NFR BLD AUTO: 52.8 %
NRBC # BLD AUTO: 0 /100WBC
NRBC # BLD AUTO: 0 X10^3/UL
PDW BLD AUTO: 9.5 FL (ref 7.9–10.8)
PHOSPHATE BLD-MCNC: 3.9 MG/DL (ref 2.5–4.6)
PLATELET # BLD: 255 10^3/UL (ref 130–450)
POTASSIUM SERPL-SCNC: 3.9 MMOL/L (ref 3.5–5)
RBC MAR: 3.57 10^6/UL (ref 4.2–5.4)
SODIUM SERPLBLD-SCNC: 140 MMOL/L (ref 135–145)

## 2021-06-02 RX ADMIN — INSULIN ASPART SCH: 100 INJECTION, SOLUTION INTRAVENOUS; SUBCUTANEOUS at 07:56

## 2021-06-02 RX ADMIN — SODIUM CHLORIDE, PRESERVATIVE FREE SCH ML: 5 INJECTION INTRAVENOUS at 07:15

## 2021-06-02 RX ADMIN — GABAPENTIN SCH MG: 400 CAPSULE ORAL at 08:29

## 2021-06-02 RX ADMIN — GLIMEPIRIDE SCH MG: 2 TABLET ORAL at 08:28

## 2021-06-02 RX ADMIN — Medication SCH CAP: at 08:30

## 2021-06-02 RX ADMIN — HYDRALAZINE HYDROCHLORIDE SCH: 20 INJECTION INTRAMUSCULAR; INTRAVENOUS at 06:13

## 2021-06-02 RX ADMIN — SODIUM CHLORIDE, PRESERVATIVE FREE SCH ML: 5 INJECTION INTRAVENOUS at 08:39

## 2021-06-02 RX ADMIN — ENOXAPARIN SODIUM SCH MG: 100 INJECTION SUBCUTANEOUS at 08:29

## 2021-06-02 RX ADMIN — Medication SCH AMP: at 08:29

## 2021-06-02 RX ADMIN — LOSARTAN POTASSIUM SCH MG: 50 TABLET, FILM COATED ORAL at 08:30

## 2021-06-02 RX ADMIN — INSULIN ASPART SCH UNIT: 100 INJECTION, SOLUTION INTRAVENOUS; SUBCUTANEOUS at 08:28

## 2021-06-02 RX ADMIN — INSULIN ASPART SCH: 100 INJECTION, SOLUTION INTRAVENOUS; SUBCUTANEOUS at 12:07

## 2021-06-02 RX ADMIN — GABAPENTIN SCH MG: 300 CAPSULE ORAL at 11:50

## 2021-06-02 NOTE — DISCHARGE SUMMARY
Discharge Summary


Admit Date: 05/30/21


Discharge Date: 06/02/21


Discharging Provider: Estefani Warren


Condition at Discharge: Stable


Discharge Disposition: 01 Home, Self Care





- HPI


History of Present Illness: 





The patient is a 56-year-old black female who has a history of uncontrolled 

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  This is her fourth admission since April 21. 





 In April she was admitted as a community-acquired pneumonia and presented as 

already being vaccinated, and was not feeling well.  In the emergency room she 

was hypotensive, required fluid resuscitation, responded to that and and 

evaluation she had a chest x-ray with "pneumonia".  With that admission, her 

white cell count was normal.  And with all subsequent admissions her white cell 

count continues to be normal except for May 14 when she was 14.8.  She does have

chest x-rays dating to 2018 in our EMR.  As such a chest x-ray with her first 

admission April 21 was compared to June 2018 chest x-ray and there is no 

evidence of acute pulmonary process.  When she developed a fever, a repeat chest

x-ray was done and she was found to have a new right upper lobe and right 

perihilar infiltrate.  As such she was treated as pneumonia with azithromycin 

and Rocephin.  She had a fever over multiple days.  Blood cultures were 

negative.  Echocardiogram done during that stay showed to have an ejection 

fraction of 60 to 65% with a normal RVSP and only minimal tricuspid 

regurgitation. LA was 39 mm/m2 and just at the cut off indicating diastolic 

heart failure (40 mm).  Her BP was 130s to 140s systolic.  Diastolic, however, 

was up to 109.  During her stay she developed orthostatic dizziness and almost 

collapsed walking in her room to the bathroom.  As such she was given a liter of

fluid, blood pressure medications were held, but they were resumed at 

discharge.Blood pressure at discharge was 138/83.


She was discharged to complete antibiotic therapy With Augmentin.





She saw her primary care provider in follow-up.  He cleared her to go back to 

work.  She let them know that she was still really dizzy and did not feel well. 

He felt that she was stable, and scheduled her for routine follow-up as well as 

routine preventative exam follow-up such as a colonoscopy.





Return to the emergency room May 5 complaining of dizziness worse with standing.

At one point she was dizzy enough she went to the urgent care clinic.  While 

there she passed out and EMS was called to bring her to ER.  She was found to 

have uncontrolled diabetes, orthostatic hypotension.  Blood pressure was 178/85.

 However with checking orthostatic vital signs her supine blood pressure was 1

48/82 with a pulse of 74.  Sitting blood pressure was 138/73 with a heart rate 

of 80.  Standing blood pressure was 109/57 with a heart rate of 89.  She 

responded to IV fluids and stopping some of her blood pressure medications.  And

she was sent home.  Blood pressure at discharge was 130/73.  Chest x-ray with 

May 5 evaluation showed her to have no pleural effusions or pneumothorax and her

lungs were clear.





She then returned May 13 acutely short of breath, with wheezing.  In the 

emergency room her inferior vena cava was engorged.  DuoNeb resulted in minimal 

improvement in her symptoms.  Because of the inferior vena cava engorgement she 

was given Lasix and had almost immediate response and felt immensely better.  

Blood pressure when she presented was 204/101.  However BNP and troponins were 

negative and did not coorelated with this diagnosis of pulmonary edema.   Chest 

x-ray showed resolving opacities.  Bilateral.  There were perihilar with right 

greater than left.  This was suggestive of resolving edema.  Orthostatic blood 

pressure was done during that stay and she was not orthostatic.  Blood pressure 

at discharge was 112/60.





She now returns to us on May 30.  She states that she has been feeling good 

enough to go back to work.  She was actually scheduled to return to work today. 

But for the last week she has been having increasing leg edema.  Her thighs were

starting to feel exceedingly heavy especially yesterday.  When she would lay 

down at night, she would feel short of breath and start to wheeze so she have to

sit up for a while until she felt better and then fall back asleep.  She denies 

fever, chills.  Cough is daily and is not productive.  The cough will get worse 

the more work she does.  Since she is not working, she has become very detail 

oriented about cleaning her house.  So she has been cleaning her windows, 

dusting all the furniture, picking up furniture and mopping the floors, cleaning

out her closets.  She has been able to do all of that and the cough accompanies 

that work.  When she sits down and eats dinner or is watching TV the cough goes 

away.  She does not describe it as dyspnea on exertion.  She lives in her own 

home.  No pets.  No birds.  Not exposed to farm life. She began having severe 

shortness of breath that woke her up from her sleep approximately 30 minutes 

prior to admission in the early morning hours.  She had been well all day 

yesterday, and she has had varying degrees of shortness of breath since she had 

been admitted for pneumonia.   was the historian because she was in 

severe respiratory distress. Because she is a [facility] patient, she actually tried

calling the [facility] nurse before coming in.  The [facility] nurse was alarmed because

the patient could not even speak normally with her shortness of breath.  And she

instructed the patient to come to the emergency room. Blood pressure on her 

presentation was 210/103 with a respiratory rate of 32.  3 L mask resulted in a 

96% O2 sat.  Chest x-ray shows interstitial prominence that is abnormal.  Trace 

blunting of the costophrenic angles.  Small bilateral pleural effusions.  The 

radiologist states that "a history of MRSA pneumonia is given and the appearance

is consistent with such".The emergency room provider gave her albuterol, Lasix 

40 mg IV push, DuoNeb, albuterol.  She has felt substantially better.  We 

transferred to the ICU thinking she was going to need BiPAP but she is now on 

just nasal cannula, 2 L.  Blood pressure is still significantly elevated at 202-

207 systolic.   diastolic.  Because the chest x-ray showed questionable 

pneumonia, we did a CT of the chest. The ER physician gave her cefepime and 

vancomycin. CT of the chest has consolidated infiltrates at the lung bases 

including air bronchograms.  Milder patchy infiltrates as well as groundglass 

opacities are seen elsewhere.  Infection is suspected.  A history of MRSA 

pneumonia is given.





Patient remained afebrile after 2 days.  Her white blood cell count also 

remained normal.  Blood cultures were no growth to date x3 days.  As a result 

antibiotics were discontinued.


Patient was diuresed with Lasix intermittently through the course of her 

hospital stay.  She put out about 10 L of urine over 3-day hospital stay.


At admission she was on BiPAP.  Her oxygen requirement steadily decreased and 

she was weaned off oxygen by the time of discharge.


She had a 2D echocardiogram done on 04/22/21.  As a result it was not repeated 

during this admission. It showed an ejection fraction of 60 to 65%.  Overall 

left ventricular systolic function was normal.  The right ventricle was normal 

in size and function.  There was no significant valvular disease.Upon discharge 

the following changes was made to the patient's med rec.


Actos was discontinued due to heart failure.  Lantus was discontinued due to 

insurance coverage and cost.


The patient's Metformin  mg p.o. daily was resumed.  She will continue 

taking Amaryl 2 mg p.o. daily.


Humulin 70/30 8 units twice daily was ordered.  The hope is that patient's blood

glucose will be appropriately controlled on this regimen and she would not 

require extra correction.


So far her hemoglobin A1c has improved from 14 down to 9


She is to keep her blood glucose record 3 times daily.


She was advised about symptoms of hypoglycemia and to check her blood sugar if 

she starts feeling foggy, jittery, confused or diaphoretic.  She is to drink 

orange juice and all eat crackers and peanut butter if her blood sugar is low.


She was also prescribed Lasix 20 mg p.o. daily for 7 days.


Hydralazine 25 mg p.o. 4 times daily was ordered.


The patient is already on losartan 100 mg p.o. daily.





She has not appointment with her primary care physician tomorrow which she was 

advised to keep.


It is expected that her primary care physician will make our cardiology 

referral.


Is also expected that her primary care physician will order basic metabolic p

lauren and a lactic acid level in 7 days.











- ALLERGIES


Allergies/Adverse Reactions: 


                                    Allergies











Allergy/AdvReac Type Severity Reaction Status Date / Time


 


No Known Drug Allergies Allergy   Verified 05/13/21 05:52














- MEDICATIONS


Home Medications: 


                                Ambulatory Orders











 Medication  Instructions  Recorded  Confirmed


 


Glimepiride [Amaryl] 2 mg PO DAILYWM #30 tablet 03/26/18 05/30/21


 


Gabapentin 1,200 mg PO BID 06/27/18 05/30/21


 


Simvastatin 40 mg PO QPM 06/27/18 05/30/21


 


Gabapentin [Neurontin] 600 mg PO 1200 04/21/21 05/30/21


 


Ferric Citrate [Auryxia] 210 mg PO DAILY #30 tablet 04/25/21 05/30/21


 


Blood Sugar Diagnostic [Glucometer 1 each  QID #200 strip 05/07/21 05/30/21





Strips]   


 


Blood-Glucose Meter [Glucometer] 1 each  QID #1 each 05/07/21 05/30/21


 


Insulin Aspart [NovoLOG] 2 - 10 unit SUBQ 05/07/21 05/30/21





 0800,1200,1700,2100 #10  


 


Lancets 1 each  QID #200 each 05/07/21 05/30/21


 


Losartan [Cozaar] 100 mg PO DAILY #60 tablet 05/14/21 05/30/21


 


Furosemide [Lasix] 20 mg PO DAILY #7 tablet 06/02/21 


 


Insulin NPH Hum/Reg Insulin Hm 8 unit SUBQ BID #3 each 06/02/21 





[Humulin 70/30 Kwikpen]   


 


Metformin HCl [Metformin  mg PO DAILY #30 06/02/21 





Gastric]   


 


hydrALAZINE [Apresoline] 25 mg PO QID 30 Days #120 tablet 06/02/21 














- PHYSICAL EXAM AT DISCHARGE


General Appearance: positive: No acute distress, Alert


Eyes Bilateral: positive: PERRL, EOMI


ENT: positive: No signs of dehydration


Neck: positive: No JVD, Trachea midline


Respiratory: positive: Chest non-tender, No respiratory distress, Breath sounds 

nml, Other (Coarse breath sounds but no significant crackles or rales)


Cardiovascular: positive: Regular rate & rhythm, No murmur


Abdomen: positive: Non-tender, No organomegaly, Nml bowel sounds, No distention.

  negative: Guarding, Rebound


Skin: positive: Color nml, No rash, Warm, Dry


Extremities: positive: Non-tender, Full ROM, Nml appearance, No pedal edema


Neurologic/Psychiatric: positive: Oriented x3, Mood/affect nml





- LABS


Result Diagrams: 


                                 06/02/21 04:10





                                 06/02/21 04:10





- TIME SPENT


Time Spent in Discharge (Minutes): 30

## 2021-06-02 NOTE — DISCHARGE PLAN
Discharge Plan


Problem Reviewed?: Yes


Disposition: 01 Home, Self Care


Condition: Stable


Prescriptions: 


hydrALAZINE [Apresoline] 25 mg PO QID 30 Days #120 tablet


Insulin NPH Hum/Reg Insulin Hm [Humulin 70/30 Kwikpen] 8 unit SUBQ BID #3 each


Furosemide [Lasix] 20 mg PO DAILY #7 tablet


Metformin HCl [Metformin ER Gastric] 500 mg PO DAILY #30


Diet: Diabetic


Activity Restrictions: Activity as Tolerated


Shower Restrictions: No


Weight Bearing: Full Weight


Health Concerns: 


You were admitted for difficulty breathing for which it was suspected that you 

had pneumonia and acute heart failure with preserved ejection fraction.


You were started on antibiotics which was maintained for 2 days.  This included 

vancomycin and cefepime.  In this time you are white blood cell count remained 

normal and you did not have a fever.  Also your blood cultures were no growth to

date for 3 days.  As a result the antibiotics were discontinued.


Last echocardiogram was done on 04/22/21.  This showed an ejection fraction of 

60 to 65%.  Overall left ventricular systolic function was normal.  The right 

ventricle was normal in size and function.  There was no significant valvular 

disease. A 2D echocardiogram was not repeated during this admission.


You received Lasix IV intermittently to your hospital because with significant 

fluid output.


You required supplemental oxygen which was steadily decreased and weaned off by 

the day of discharge.


At discharge there were some changes made to your medications as follows.


You were started on hydralazine 25 mg p.o. 4 times daily.


Lasix 20 mg p.o. daily for 7 days then stop.


Metformin  mg p.o. daily.


Humulin 70/30 8 unit SQ twice daily.





Actos was discontinued due to effects of heart failure.


Lantus was discontinued due to insurance cost.





You are instructed to keep your blood sugar readings 3 times daily for a week.  

This will enable evaluation of the new prescription of Humulin and adjustment of

the dosage accordingly. The hope will be to eventually not need additional 

correction (Sliding scale insulin).


 It is expected that your primary care physician will manage this.  Also it is 

expected that your primary care physician will order a Basic Metabolic Panel and

lactic acid in a week to assess your renal function in light of Lasix and lactic

acid level given resumption of Metformin.


You will need a referral to cardiology by your  primary care physician.


The above was explained to you.  You expressed understanding and are in 

agreement.


Plan of Treatment: 


You were admitted for difficulty breathing for which it was suspected that you 

had pneumonia and acute heart failure with preserved ejection fraction.


You were started on antibiotics which was maintained for 2 days.  This included 

vancomycin and cefepime.  In this time you are white blood cell count remained 

normal and you did not have a fever.  Also your blood cultures were no growth to

date for 3 days.  As a result the antibiotics were discontinued.


Last echocardiogram was done on 04/22/21.  This showed an ejection fraction of 

60 to 65%.  Overall left ventricular systolic function was normal.  The right v

entricle was normal in size and function.  There was no significant valvular 

disease. A 2D echocardiogram was not repeated during this admission.


You received Lasix IV intermittently to your hospital because with significant 

fluid output.


You required supplemental oxygen which was steadily decreased and weaned off by 

the day of discharge.


At discharge there were some changes made to your medications as follows.


You were started on hydralazine 25 mg p.o. 4 times daily.


Lasix 20 mg p.o. daily for 7 days then stop.


Metformin  mg p.o. daily.


Humulin 70/30 8 unit SQ twice daily.





Actos was discontinued due to effects of heart failure.


Lantus was discontinued due to insurance cost.





You are instructed to keep your blood sugar readings 3 times daily for a week.  

This will enable evaluation of the new prescription of Humulin and adjustment of

the dosage accordingly. The hope will be to eventually not need additional 

correction (Sliding scale insulin).


 It is expected that your primary care physician will manage this.  Also it is 

expected that your primary care physician will order a Basic Metabolic Panel and

lactic acid in a week to assess your renal function in light of Lasix and lactic

acid level given resumption of Metformin.


You will need a referral to cardiology by your  primary care physician.


The above was explained to you.  You expressed understanding and are in 

agreement.


Care Goals: 


You were admitted for difficulty breathing for which it was suspected that you 

had pneumonia and acute heart failure with preserved ejection fraction.


You were started on antibiotics which was maintained for 2 days.  This included 

vancomycin and cefepime.  In this time you are white blood cell count remained 

normal and you did not have a fever.  Also your blood cultures were no growth to

date for 3 days.  As a result the antibiotics were discontinued.


Last echocardiogram was done on 04/22/21.  This showed an ejection fraction of 

60 to 65%.  Overall left ventricular systolic function was normal.  The right 

ventricle was normal in size and function.  There was no significant valvular 

disease. A 2D echocardiogram was not repeated during this admission.


You received Lasix IV intermittently to your hospital because with significant 

fluid output.


You required supplemental oxygen which was steadily decreased and weaned off by 

the day of discharge.


At discharge there were some changes made to your medications as follows.


You were started on hydralazine 25 mg p.o. 4 times daily.


Lasix 20 mg p.o. daily for 7 days then stop.


Metformin  mg p.o. daily.


Humulin 70/30 8 unit SQ twice daily.





Actos was discontinued due to effects of heart failure.


Lantus was discontinued due to insurance cost.





You are instructed to keep your blood sugar readings 3 times daily for a week.  

This will enable evaluation of the new prescription of Humulin and adjustment of

the dosage accordingly. The hope will be to eventually not need additional 

correction (Sliding scale insulin).


 It is expected that your primary care physician will manage this.  Also it is 

expected that your primary care physician will order a Basic Metabolic Panel and

lactic acid in a week to assess your renal function in light of Lasix and lactic

acid level given resumption of Metformin.


You will need a referral to cardiology by your  primary care physician.


The above was explained to you.  You expressed understanding and are in 

agreement.


Assessment: 


You were admitted for difficulty breathing for which it was suspected that you 

had pneumonia and acute heart failure with preserved ejection fraction.


You were started on antibiotics which was maintained for 2 days.  This included 

vancomycin and cefepime.  In this time you are white blood cell count remained 

normal and you did not have a fever.  Also your blood cultures were no growth to

date for 3 days.  As a result the antibiotics were discontinued.


Last echocardiogram was done on 04/22/21.  This showed an ejection fraction of 

60 to 65%.  Overall left ventricular systolic function was normal.  The right 

ventricle was normal in size and function.  There was no significant valvular 

disease. A 2D echocardiogram was not repeated during this admission.


You received Lasix IV intermittently to your hospital because with significant 

fluid output.


You required supplemental oxygen which was steadily decreased and weaned off by 

the day of discharge.


At discharge there were some changes made to your medications as follows.


You were started on hydralazine 25 mg p.o. 4 times daily.


Lasix 20 mg p.o. daily for 7 days then stop.


Metformin  mg p.o. daily.


Humulin 70/30 8 unit SQ twice daily.





Actos was discontinued due to effects of heart failure.


Lantus was discontinued due to insurance cost.





You are instructed to keep your blood sugar readings 3 times daily for a week.  

This will enable evaluation of the new prescription of Humulin and adjustment of

the dosage accordingly. The hope will be to eventually not need additional 

correction (Sliding scale insulin).


 It is expected that your primary care physician will manage this.  Also it is 

expected that your primary care physician will order a Basic Metabolic Panel and

lactic acid in a week to assess your renal function in light of Lasix and lactic

acid level given resumption of Metformin.


You will need a referral to cardiology by your  primary care physician.


The above was explained to you.  You expressed understanding and are in 

agreement.


Additional Instructions or Follow Up instructions: 


You have a follow-up visit with your primary care physician tomorrow.


You have been advised to keep the appointment.


No Smoking: If you smoke, Please STOP!  Call [phone] for help.

## 2021-06-04 ENCOUNTER — HOSPITAL ENCOUNTER (OUTPATIENT)
Dept: HOSPITAL 76 - DI.N | Age: 56
Discharge: HOME | End: 2021-06-04
Attending: ORTHOPAEDIC SURGERY
Payer: COMMERCIAL

## 2021-06-04 ENCOUNTER — HOSPITAL ENCOUNTER (EMERGENCY)
Dept: HOSPITAL 76 - ED | Age: 56
Discharge: HOME | End: 2021-06-04
Payer: COMMERCIAL

## 2021-06-04 ENCOUNTER — HOSPITAL ENCOUNTER (OUTPATIENT)
Dept: HOSPITAL 76 - LAB.N | Age: 56
Discharge: HOME | End: 2021-06-04
Attending: ORTHOPAEDIC SURGERY
Payer: COMMERCIAL

## 2021-06-04 VITALS — SYSTOLIC BLOOD PRESSURE: 165 MMHG | DIASTOLIC BLOOD PRESSURE: 84 MMHG

## 2021-06-04 DIAGNOSIS — Z79.899: ICD-10-CM

## 2021-06-04 DIAGNOSIS — I50.30: ICD-10-CM

## 2021-06-04 DIAGNOSIS — I11.0: ICD-10-CM

## 2021-06-04 DIAGNOSIS — E78.00: ICD-10-CM

## 2021-06-04 DIAGNOSIS — J15.9: Primary | ICD-10-CM

## 2021-06-04 DIAGNOSIS — R79.1: ICD-10-CM

## 2021-06-04 DIAGNOSIS — E11.42: ICD-10-CM

## 2021-06-04 DIAGNOSIS — I11.0: Primary | ICD-10-CM

## 2021-06-04 DIAGNOSIS — R06.9: ICD-10-CM

## 2021-06-04 DIAGNOSIS — R60.9: ICD-10-CM

## 2021-06-04 DIAGNOSIS — J15.9: ICD-10-CM

## 2021-06-04 DIAGNOSIS — E10.65: ICD-10-CM

## 2021-06-04 DIAGNOSIS — Z79.4: ICD-10-CM

## 2021-06-04 DIAGNOSIS — Z66: ICD-10-CM

## 2021-06-04 LAB
ALBUMIN DIAFP-MCNC: 3.9 G/DL (ref 3.2–5.5)
ALBUMIN/GLOB SERPL: 1 {RATIO} (ref 1–2.2)
ALP SERPL-CCNC: 88 IU/L (ref 42–121)
ALT SERPL W P-5'-P-CCNC: 27 IU/L (ref 10–60)
ANION GAP SERPL CALCULATED.4IONS-SCNC: 11 MMOL/L (ref 6–13)
AST SERPL W P-5'-P-CCNC: 19 IU/L (ref 10–42)
BASOPHILS NFR BLD AUTO: 0.1 10^3/UL (ref 0–0.1)
BASOPHILS NFR BLD AUTO: 0.7 %
BILIRUB BLD-MCNC: 0.4 MG/DL (ref 0.2–1)
BUN SERPL-MCNC: 22 MG/DL (ref 6–20)
CALCIUM UR-MCNC: 9.3 MG/DL (ref 8.5–10.3)
CHLORIDE SERPL-SCNC: 100 MMOL/L (ref 101–111)
CHOLEST SERPL-MCNC: 173 MG/DL
CLARITY UR REFRACT.AUTO: CLEAR
CO2 SERPL-SCNC: 30 MMOL/L (ref 21–32)
CREAT SERPLBLD-SCNC: 0.8 MG/DL (ref 0.4–1)
EOSINOPHIL # BLD AUTO: 0.4 10^3/UL (ref 0–0.7)
EOSINOPHIL NFR BLD AUTO: 5.1 %
ERYTHROCYTE [DISTWIDTH] IN BLOOD BY AUTOMATED COUNT: 15.4 % (ref 12–15)
EST. AVERAGE GLUCOSE BLD GHB EST-MCNC: 220 MG/DL (ref 70–100)
GFRSERPLBLD MDRD-ARVRAT: 90 ML/MIN/{1.73_M2} (ref 89–?)
GLOBULIN SER-MCNC: 3.9 G/DL (ref 2.1–4.2)
GLUCOSE SERPL-MCNC: 144 MG/DL (ref 70–100)
GLUCOSE UR QL STRIP.AUTO: 100 MG/DL
HBA1C MFR BLD HPLC: 9.3 % (ref 4.27–6.07)
HCT VFR BLD AUTO: 36 % (ref 37–47)
HDLC SERPL-MCNC: 47 MG/DL
HDLC SERPL: 3.7 {RATIO} (ref ?–4.4)
HGB UR QL STRIP: 10.8 G/DL (ref 12–16)
KETONES UR QL STRIP.AUTO: NEGATIVE MG/DL
LDLC SERPL CALC-MCNC: 87 MG/DL
LDLC/HDLC SERPL: 1.9 {RATIO} (ref ?–4.4)
LYMPHOCYTES # SPEC AUTO: 1.8 10^3/UL (ref 1.5–3.5)
LYMPHOCYTES NFR BLD AUTO: 23.9 %
MCH RBC QN AUTO: 28.9 PG (ref 27–31)
MCHC RBC AUTO-ENTMCNC: 30 G/DL (ref 32–36)
MCV RBC AUTO: 96.3 FL (ref 81–99)
MONOCYTES # BLD AUTO: 0.6 10^3/UL (ref 0–1)
MONOCYTES NFR BLD AUTO: 7.7 %
NEUTROPHILS # BLD AUTO: 4.7 10^3/UL (ref 1.5–6.6)
NEUTROPHILS # SNV AUTO: 7.5 X10^3/UL (ref 4.8–10.8)
NEUTROPHILS NFR BLD AUTO: 62.2 %
NITRITE UR QL STRIP.AUTO: NEGATIVE
NRBC # BLD AUTO: 0 /100WBC
NRBC # BLD AUTO: 0 X10^3/UL
PDW BLD AUTO: 10.7 FL (ref 7.9–10.8)
PH UR STRIP.AUTO: 7 PH (ref 5–7.5)
PLATELET # BLD: 240 10^3/UL (ref 130–450)
POTASSIUM SERPL-SCNC: 4.1 MMOL/L (ref 3.5–5)
PROT SPEC-MCNC: 7.8 G/DL (ref 6.7–8.2)
PROT UR STRIP.AUTO-MCNC: NEGATIVE MG/DL
RBC # UR STRIP.AUTO: NEGATIVE /UL
RBC # URNS HPF: (no result) /HPF (ref 0–5)
RBC MAR: 3.74 10^6/UL (ref 4.2–5.4)
SODIUM SERPLBLD-SCNC: 141 MMOL/L (ref 135–145)
SP GR UR STRIP.AUTO: 1.02 (ref 1–1.03)
SQUAMOUS URNS QL MICRO: (no result)
TRIGL P FAST SERPL-MCNC: 194 MG/DL
UROBILINOGEN UR QL STRIP.AUTO: (no result) E.U./DL
UROBILINOGEN UR STRIP.AUTO-MCNC: NEGATIVE MG/DL
VLDLC SERPL-SCNC: 39 MG/DL
WBC # UR MANUAL: (no result) /HPF (ref 0–5)

## 2021-06-04 PROCEDURE — 71275 CT ANGIOGRAPHY CHEST: CPT

## 2021-06-04 PROCEDURE — 85379 FIBRIN DEGRADATION QUANT: CPT

## 2021-06-04 PROCEDURE — 83721 ASSAY OF BLOOD LIPOPROTEIN: CPT

## 2021-06-04 PROCEDURE — 99284 EMERGENCY DEPT VISIT MOD MDM: CPT

## 2021-06-04 PROCEDURE — 87040 BLOOD CULTURE FOR BACTERIA: CPT

## 2021-06-04 PROCEDURE — 81001 URINALYSIS AUTO W/SCOPE: CPT

## 2021-06-04 PROCEDURE — 83036 HEMOGLOBIN GLYCOSYLATED A1C: CPT

## 2021-06-04 PROCEDURE — 80053 COMPREHEN METABOLIC PANEL: CPT

## 2021-06-04 PROCEDURE — 87640 STAPH A DNA AMP PROBE: CPT

## 2021-06-04 PROCEDURE — 80061 LIPID PANEL: CPT

## 2021-06-04 PROCEDURE — 71046 X-RAY EXAM CHEST 2 VIEWS: CPT

## 2021-06-04 PROCEDURE — 83880 ASSAY OF NATRIURETIC PEPTIDE: CPT

## 2021-06-04 PROCEDURE — 85025 COMPLETE CBC W/AUTO DIFF WBC: CPT

## 2021-06-04 PROCEDURE — 84443 ASSAY THYROID STIM HORMONE: CPT

## 2021-06-04 PROCEDURE — 36415 COLL VENOUS BLD VENIPUNCTURE: CPT

## 2021-06-04 PROCEDURE — 99282 EMERGENCY DEPT VISIT SF MDM: CPT

## 2021-06-04 NOTE — ED PHYSICIAN DOCUMENTATION
History of Present Illness





- Stated complaint


Stated Complaint: F/U BLOOD WORK





- Chief complaint


Chief Complaint: General





- History obtained from


History obtained from: Patient





- Additonal information


Additional information: 





Recent inpatient admission for pneumonia and sepsis.  Her physician saw her in 

follow-up and performed a D-dimer and it was positive for so was sent here to 

rule out PE.  She has no leg symptoms.  No chest pain or trouble breathing.





Review of Systems


Constitutional: denies: Fever, Chills


Cardiac: denies: Chest pain / pressure, Palpitations


Respiratory: denies: Dyspnea





PD PAST MEDICAL HISTORY





- Past Medical History


Past Medical History: Yes


Cardiovascular: Hypertension, High cholesterol, Other


Respiratory: Pneumonia, Shortness of breath


Neuro: Peripheral neuropathy


Endocrine/Autoimmune: Type 2 diabetes


GI: None


GYN: Other


: None


HEENT: Other


Psych: None


Musculoskeletal: None


Derm: None





- Past Surgical History


Past Surgical History: Yes


General: Other


/GYN: Other





- Present Medications


Home Medications: 


                                Ambulatory Orders











 Medication  Instructions  Recorded  Confirmed


 


Glimepiride [Amaryl] 2 mg PO DAILYWM #30 tablet 03/26/18 06/04/21


 


Gabapentin 1,200 mg PO BID 06/27/18 06/04/21


 


Simvastatin 40 mg PO QPM 06/27/18 06/04/21


 


Gabapentin [Neurontin] 600 mg PO 1200 04/21/21 06/04/21


 


Insulin Aspart [NovoLOG] 2 - 10 unit SUBQ 05/07/21 06/04/21





 0800,1200,1700,2100 #10  


 


Lancets 1 each MC QID #200 each 05/07/21 06/04/21


 


Losartan [Cozaar] 100 mg PO DAILY #60 tablet 05/14/21 06/04/21


 


Furosemide [Lasix] 20 mg PO DAILY #7 tablet 06/02/21 06/04/21


 


Insulin NPH Hum/Reg Insulin Hm 8 unit SUBQ BID #3 each 06/02/21 06/04/21





[Humulin 70/30 Kwikpen]   


 


hydrALAZINE [Apresoline] 25 mg PO QID 30 Days #120 tablet 06/02/21 06/04/21


 


Blood Sugar Diagnostic [Glucometer 1 each MC TID 06/04/21 06/04/21





Strips]   


 


Blood-Glucose Meter [Glucometer] 1 each MC TID 06/04/21 06/04/21


 


Metformin HCl [Metformin  mg PO BID 06/04/21 06/04/21





Gastric]   














- Allergies


Allergies/Adverse Reactions: 


                                    Allergies











Allergy/AdvReac Type Severity Reaction Status Date / Time


 


No Known Drug Allergies Allergy   Verified 06/04/21 13:26














- Social History


Does the pt smoke?: No


Smoking Status: Never smoker


Does the pt drink ETOH?: No


Does the pt have substance abuse?: No





- Immunizations


Immunizations are current?: Yes





- POLST


Patient has POLST: No


POLST Status: DNR





PD ED PE NORMAL





- Vitals


Vital signs reviewed: Yes





- General


General: Alert and oriented X 3, No acute distress





- HEENT


HEENT: PERRL, EOMI





- Neck


Neck: Supple, no meningeal sign, No bony TTP





- Cardiac


Cardiac: RRR, No murmur





- Respiratory


Respiratory: No respiratory distress, Other (Crackles at right greater than left

base)





- Extremities


Extremities: No edema, No calf tenderness / cord





- Neuro


Neuro: Alert and oriented X 3, Normal speech





Results





- Vitals


Vitals: 


                               Vital Signs - 24 hr











  06/04/21 06/04/21





  13:26 15:48


 


Temperature 36.7 C 37.1 C


 


Heart Rate 82 79


 


Respiratory 16 14





Rate  


 


Blood Pressure 164/78 H 165/84 H


 


O2 Saturation 99 97








                                     Oxygen











O2 Source                      Room air

















PD MEDICAL DECISION MAKING





- ED course


ED course: 





56-year-old woman here for high D-dimer done in the outpatient setting.  Concern

for PE given recent inpatient admission for pneumonia.  CTPA done without 

evidence of thromotic disease.





Departure





- Departure


Disposition: 01 Home, Self Care


Clinical Impression: 


Pneumonia


Qualifiers:


 Pneumonia type: due to unspecified organism Laterality: unspecified laterality 

Lung location: unspecified part of lung Qualified Code(s): J18.9 - Pneumonia, 

unspecified organism





Condition: Good


Record reviewed to determine appropriate education?: Yes


Comments: 


No blood clot evidence in your lungs.  You do have improving changes from prior 

pneumonia.  Follow-up with your primary care physician.  Return for new or 

worsening symptoms.

## 2021-06-04 NOTE — EXTERNAL MEDICAL SUMMARY RPT
Continuity of Care Document

                             Created on:2021



Patient:MARGOT SANCHEZ

Sex:Female

:1965

External Reference #:[id number]





Demographics







                          Phone                     Unavailable

 

                          Preferred Language        Unknown

 

                          Marital Status            Unknown

 

                          Restorationist Affiliation     Unknown

 

                          Race                      Unknown

 

                          Ethnic Group              Unknown









Author







                          Organization              [facility]

 

                          Address                    [place] [id number]

 

                          Phone                     [phone]









Allergies





Encounters





Medications





Problems





Results

## 2021-06-04 NOTE — CT REPORT
PROCEDURE:  ANGIO CHEST W/WO

 

INDICATIONS:  dyspnea, high dimer, pe protocol

 

CONTRAST:  IV CONTRAST: Optiray 320 ml: 80 PO CONTRAST: *NO PO CONTRAST 

 

TECHNIQUE:  

After the administration of intravenous contrast, 2 mm thick sections acquired from the pulmonary api
maya to the posterior costophrenic angles.  3-dimensional maximum intensity projection (MIP) coronal a
nd sagittal reformats were then acquired through the thorax. For radiation dose reduction, the follow
ing was used:  automated exposure control, adjustment of mA and/or kV according to patient size. 

 

COMPARISON:  CT chest dated 5/30/2021

 

FINDINGS:  

Image quality:  Excellent.  

 

Pulmonary arteries:  Pulmonary arteries are normal in size, and demonstrate no intraluminal filling d
efects to suggest central pulmonary embolism.  

 

Lungs and pleura: There is interval decrease in amount of bilateral pleural effusion with small to mo
derate residual pleural effusion or prominent on the right side. Adjacent compressive atelectasis in 
posterior aspect of bilateral lung fields are seen. Scattered small airspace opacities are seen in po
sterior aspect of bilateral lower lobes improved since previous study. Small scattered opacities in p
osterior lateral aspect of right upper lobe is also seen suggestive of resolving pulmonary infiltrate
s. There is no pneumothorax. Central and peripheral airways are patent.  

 

Mediastinum:  Heart size is normal, without pericardial effusion. Mildly prominent mediastinal lymph 
nodes are seen and measures up to 8 to 9 mm in short axis diameter in right paratracheal space. Thora
cic aorta is normal in caliber and enhancement.  Esophagus is normal in caliber, without hiatal herni
a.  

 

Bones and chest wall:  No suspicious bony lesions.  Ribs and thoracic spine appear intact throughout.
  No axillary or supraclavicular adenopathy.  The thyroid is normal in size and there are no incident
al findings.

 

Abdomen:  Visualized upper abdominal solid organs appear normal in the early arterial phase of enhanc
ement.  

 

IMPRESSION:  

1. No evidence of pulmonary emboli. No thoracic aortic aneurysm.

2. Small to moderate bilateral pleural effusion more prominent on the right side decreased since prev
ious study.

3. Interval improvement in bilateral lung aeration with scattered residual infiltrate/atelectasis in 
posterior aspect of bilateral lower lobes and posterior lateral aspect of right upper lobe. No pneumo
thorax. Airway is patent.

4. Underlying enlarged mediastinal lymph nodes concerning for mild reactive inflammatory lymphadenopa
thy.

 

Reviewed by: Lacho Garcia MD on 6/4/2021 3:50 PM PDT

Approved by: Lacho Garcia MD on 6/4/2021 3:50 PM PDT

 

 

Station ID:  [id number]

## 2021-06-04 NOTE — EXTERNAL MEDICAL SUMMARY RPT
Continuity of Care Document

                             Created on:2021



Patient:MARGOT SANCHEZ

Sex:Female

:1965

External Reference #:[id number]





Demographics







                          Phone                     Unavailable

 

                          Preferred Language        Unknown

 

                          Marital Status            Unknown

 

                          Mandaen Affiliation     Unknown

 

                          Race                      Unknown

 

                          Ethnic Group              Unknown









Author







                          Organization              [facility]

 

                          Address                    [place] [id number]

 

                          Phone                     [phone]









Allergies





Encounters





Medications





Problems





Results

## 2021-06-04 NOTE — XRAY REPORT
PROCEDURE:  Chest 2 View X-Ray

 

INDICATIONS:  BACTERIAL PNEUMONIA

 

TECHNIQUE:  2 view(s) of the chest.  

 

COMPARISON:  5/30/2021.

 

FINDINGS:  

 

Surgical changes and devices:  None.  

 

Lungs and pleura:  No pleural effusions or pneumothorax. Mildly increased bronchovascular markings in
 bilateral hilar region are seen with mild bronchial wall thickening. No focal infiltrate is seen on 
the current study.

 

Mediastinum:  Mediastinal contours are normal.  Heart size is normal.  

 

Bones and chest wall:  No suspicious bony abnormalities.  Soft tissues appear unremarkable.  

 

IMPRESSION:  Interval complete resolution of previously noted bilateral pulmonary infiltrates. No foc
al infiltrate is seen on the current study. No pleural effusion or pneumothorax.

 

Reviewed by: Lacho Garcia MD on 6/4/2021 9:21 AM PDT

Approved by: Lacho Garcia MD on 6/4/2021 9:21 AM PDT

 

 

Station ID:  [id number]

## 2021-08-12 ENCOUNTER — HOSPITAL ENCOUNTER (EMERGENCY)
Dept: HOSPITAL 76 - ED | Age: 56
LOS: 1 days | Discharge: ANOTHER HOSPITAL | End: 2021-08-13
Payer: COMMERCIAL

## 2021-08-12 ENCOUNTER — HOSPITAL ENCOUNTER (OUTPATIENT)
Dept: HOSPITAL 76 - EMS | Age: 56
Discharge: ANOTHER HOSPITAL | End: 2021-08-12
Payer: COMMERCIAL

## 2021-08-12 VITALS — SYSTOLIC BLOOD PRESSURE: 170 MMHG | DIASTOLIC BLOOD PRESSURE: 80 MMHG

## 2021-08-12 DIAGNOSIS — R40.4: Primary | ICD-10-CM

## 2021-08-12 DIAGNOSIS — I66.21: Primary | ICD-10-CM

## 2021-08-12 DIAGNOSIS — I10: ICD-10-CM

## 2021-08-12 DIAGNOSIS — Z66: ICD-10-CM

## 2021-08-12 DIAGNOSIS — E11.42: ICD-10-CM

## 2021-08-12 DIAGNOSIS — J96.91: ICD-10-CM

## 2021-08-12 DIAGNOSIS — Z79.4: ICD-10-CM

## 2021-08-12 DIAGNOSIS — Z20.822: ICD-10-CM

## 2021-08-12 LAB
ALBUMIN DIAFP-MCNC: 3.9 G/DL (ref 3.2–5.5)
ALBUMIN/GLOB SERPL: 1 {RATIO} (ref 1–2.2)
ALP SERPL-CCNC: 90 IU/L (ref 42–121)
ALT SERPL W P-5'-P-CCNC: 23 IU/L (ref 10–60)
AMPHET UR QL SCN: NEGATIVE
ANION GAP SERPL CALCULATED.4IONS-SCNC: 12 MMOL/L (ref 6–13)
APAP SERPL-MCNC: < 10 UG/ML (ref 10–30)
ARTERIAL PATENCY WRIST A: POSITIVE
AST SERPL W P-5'-P-CCNC: 22 IU/L (ref 10–42)
B PARAPERT DNA SPEC QL NAA+PROBE: NOT DETECTED
B PERT DNA SPEC QL NAA+PROBE: NOT DETECTED
BARBITURATES UR QL SCN>300 NG/ML: NEGATIVE
BASE EXCESS BLDMV CALC-SCNC: 1.9 MMOL/L (ref -2–3)
BASOPHILS NFR BLD AUTO: 0 10^3/UL (ref 0–0.1)
BASOPHILS NFR BLD AUTO: 0.5 %
BENZODIAZ UR QL SCN: NEGATIVE
BILIRUB BLD-MCNC: 0.6 MG/DL (ref 0.2–1)
BUN SERPL-MCNC: 15 MG/DL (ref 6–20)
C PNEUM DNA NPH QL NAA+NON-PROBE: NOT DETECTED
CALCIUM UR-MCNC: 9.5 MG/DL (ref 8.5–10.3)
CHLORIDE SERPL-SCNC: 99 MMOL/L (ref 101–111)
CLARITY UR REFRACT.AUTO: CLEAR
CO2 BLDA CALC-SCNC: 24.7 MMOL/L (ref 21–29)
CO2 SERPL-SCNC: 26 MMOL/L (ref 21–32)
COCAINE UR-SCNC: NEGATIVE UMOL/L
CREAT SERPLBLD-SCNC: 1.3 MG/DL (ref 0.4–1)
DEPRECATED HCO3 PLAS-SCNC: 23.8 MMOL/L (ref 22–26)
EOSINOPHIL # BLD AUTO: 0.2 10^3/UL (ref 0–0.7)
EOSINOPHIL NFR BLD AUTO: 2.4 %
ERYTHROCYTE [DISTWIDTH] IN BLOOD BY AUTOMATED COUNT: 13.4 % (ref 12–15)
ETHANOL BLD-MCNC: < 5 MG/DL
FIO2: 40
FLUAV RNA RESP QL NAA+PROBE: NOT DETECTED
GFRSERPLBLD MDRD-ARVRAT: 51 ML/MIN/{1.73_M2} (ref 89–?)
GLOBULIN SER-MCNC: 4.1 G/DL (ref 2.1–4.2)
GLUCOSE SERPL-MCNC: 297 MG/DL (ref 70–100)
GLUCOSE UR QL STRIP.AUTO: 250 MG/DL
HAEM INFLU B DNA SPEC QL NAA+PROBE: NOT DETECTED
HCOV 229E RNA SPEC QL NAA+PROBE: NOT DETECTED
HCOV HKU1 RNA UPPER RESP QL NAA+PROBE: NOT DETECTED
HCOV NL63 RNA ASPIRATE QL NAA+PROBE: NOT DETECTED
HCOV OC43 RNA SPEC QL NAA+PROBE: NOT DETECTED
HCT VFR BLD AUTO: 34.7 % (ref 37–47)
HGB UR QL STRIP: 11.4 G/DL (ref 12–16)
HMPV AG SPEC QL: NOT DETECTED
HPIV1 RNA NPH QL NAA+PROBE: NOT DETECTED
HPIV2 SPEC QL CULT: NOT DETECTED
HPIV3 AB TITR SER CF: NOT DETECTED {TITER}
HPIV4 RNA SPEC QL NAA+PROBE: NOT DETECTED
INHALED O2 CONCENTRATION: 16 B/MIN
INR PPP: 1.2 (ref 0.8–1.2)
KETONES UR QL STRIP.AUTO: NEGATIVE MG/DL
LIPASE SERPL-CCNC: 22 U/L (ref 22–51)
LYMPHOCYTES # SPEC AUTO: 2.3 10^3/UL (ref 1.5–3.5)
LYMPHOCYTES NFR BLD AUTO: 27.6 %
M PNEUMO DNA SPEC QL NAA+PROBE: NOT DETECTED
MCH RBC QN AUTO: 28.8 PG (ref 27–31)
MCHC RBC AUTO-ENTMCNC: 32.9 G/DL (ref 32–36)
MCV RBC AUTO: 87.6 FL (ref 81–99)
METHADONE UR QL SCN: NEGATIVE
METHAMPHET UR QL SCN: NEGATIVE
MONOCYTES # BLD AUTO: 0.6 10^3/UL (ref 0–1)
MONOCYTES NFR BLD AUTO: 7.4 %
NEUTROPHILS # BLD AUTO: 5.1 10^3/UL (ref 1.5–6.6)
NEUTROPHILS # SNV AUTO: 8.3 X10^3/UL (ref 4.8–10.8)
NEUTROPHILS NFR BLD AUTO: 61.9 %
NITRITE UR QL STRIP.AUTO: NEGATIVE
NRBC # BLD AUTO: 0 /100WBC
NRBC # BLD AUTO: 0 X10^3/UL
OPIATES UR QL SCN: NEGATIVE
PCO2 TEMP ADJ BLDCOA: 30 MMHG (ref 34–45)
PDW BLD AUTO: 10.8 FL (ref 7.9–10.8)
PH TEMP ADJ BLDA: 7.52 [PH] (ref 7.35–7.45)
PH UR STRIP.AUTO: 7.5 PH (ref 5–7.5)
PLAT MORPH BLD: (no result)
PLATELET # BLD: (no result) 10^3/UL (ref 130–450)
PLATELET BLD QL SMEAR: (no result)
PO2 TEMP ADJ BLDCOA: 127 MMHG (ref 80–100)
POTASSIUM SERPL-SCNC: 3.7 MMOL/L (ref 3.5–5)
PROT SPEC-MCNC: 8 G/DL (ref 6.7–8.2)
PROT UR STRIP.AUTO-MCNC: NEGATIVE MG/DL
PROTHROM ACT/NOR PPP: 13.5 SECS (ref 9.9–12.6)
RBC # UR STRIP.AUTO: (no result) /UL
RBC # URNS HPF: (no result) /HPF (ref 0–5)
RBC MAR: 3.96 10^6/UL (ref 4.2–5.4)
RBC MORPH BLD: (no result)
RSV RNA RESP QL NAA+PROBE: NOT DETECTED
RV+EV RNA SPEC QL NAA+PROBE: NOT DETECTED
SALICYLATES SERPL-MCNC: < 6 MG/DL
SAO2 % BLDA FROM PO2: 99 % (ref 94–98)
SARS-COV-2 RNA PNL SPEC NAA+PROBE: NOT DETECTED
SODIUM SERPLBLD-SCNC: 137 MMOL/L (ref 135–145)
SP GR UR STRIP.AUTO: 1.01 (ref 1–1.03)
SQUAMOUS URNS QL MICRO: (no result)
THC UR QL SCN: NEGATIVE
UROBILINOGEN UR QL STRIP.AUTO: (no result) E.U./DL
UROBILINOGEN UR STRIP.AUTO-MCNC: NEGATIVE MG/DL
VENTILATION MODE VENT: (no result)
VOLATILE DRUGS POS SERPL SCN: (no result)
WBC # UR MANUAL: (no result) /HPF (ref 0–5)

## 2021-08-12 PROCEDURE — 96376 TX/PRO/DX INJ SAME DRUG ADON: CPT

## 2021-08-12 PROCEDURE — 70498 CT ANGIOGRAPHY NECK: CPT

## 2021-08-12 PROCEDURE — 71045 X-RAY EXAM CHEST 1 VIEW: CPT

## 2021-08-12 PROCEDURE — 0202U NFCT DS 22 TRGT SARS-COV-2: CPT

## 2021-08-12 PROCEDURE — 80307 DRUG TEST PRSMV CHEM ANLYZR: CPT

## 2021-08-12 PROCEDURE — 82803 BLOOD GASES ANY COMBINATION: CPT

## 2021-08-12 PROCEDURE — 80329 ANALGESICS NON-OPIOID 1 OR 2: CPT

## 2021-08-12 PROCEDURE — 87086 URINE CULTURE/COLONY COUNT: CPT

## 2021-08-12 PROCEDURE — 85610 PROTHROMBIN TIME: CPT

## 2021-08-12 PROCEDURE — 81003 URINALYSIS AUTO W/O SCOPE: CPT

## 2021-08-12 PROCEDURE — 83690 ASSAY OF LIPASE: CPT

## 2021-08-12 PROCEDURE — 36415 COLL VENOUS BLD VENIPUNCTURE: CPT

## 2021-08-12 PROCEDURE — 85025 COMPLETE CBC W/AUTO DIFF WBC: CPT

## 2021-08-12 PROCEDURE — 84443 ASSAY THYROID STIM HORMONE: CPT

## 2021-08-12 PROCEDURE — 96375 TX/PRO/DX INJ NEW DRUG ADDON: CPT

## 2021-08-12 PROCEDURE — 36600 WITHDRAWAL OF ARTERIAL BLOOD: CPT

## 2021-08-12 PROCEDURE — 80320 DRUG SCREEN QUANTALCOHOLS: CPT

## 2021-08-12 PROCEDURE — 31500 INSERT EMERGENCY AIRWAY: CPT

## 2021-08-12 PROCEDURE — 99291 CRITICAL CARE FIRST HOUR: CPT

## 2021-08-12 PROCEDURE — 70496 CT ANGIOGRAPHY HEAD: CPT

## 2021-08-12 PROCEDURE — 51702 INSERT TEMP BLADDER CATH: CPT

## 2021-08-12 PROCEDURE — 70450 CT HEAD/BRAIN W/O DYE: CPT

## 2021-08-12 PROCEDURE — 96374 THER/PROPH/DIAG INJ IV PUSH: CPT

## 2021-08-12 PROCEDURE — 80053 COMPREHEN METABOLIC PANEL: CPT

## 2021-08-12 PROCEDURE — 80306 DRUG TEST PRSMV INSTRMNT: CPT

## 2021-08-12 PROCEDURE — 93005 ELECTROCARDIOGRAM TRACING: CPT

## 2021-08-12 PROCEDURE — 81001 URINALYSIS AUTO W/SCOPE: CPT

## 2021-08-12 PROCEDURE — 72125 CT NECK SPINE W/O DYE: CPT

## 2021-08-12 NOTE — XRAY REPORT
PROCEDURE:  Chest for Line Placement

 

INDICATIONS:  POST INTUBATION

 

TECHNIQUE:  One view of the chest was acquired.  

 

COMPARISON:  Chest x-ray 6/4/2021

 

FINDINGS:  

 

Surgical changes and devices: Endotracheal tube is present approximately 7 mm appeared to the delmy.


 

Lungs and pleura: Appearance of overall increased pulmonary opacities.

 

Mediastinum:  Mediastinal contours appear normal.  Heart size is enlarged.

 

Bones and chest wall:  No suspicious bony lesions.  Overlying soft tissues appear unremarkable.  

 

IMPRESSION:  

Mild appearance of increased bilateral opacities which could represent increased vascularity versus e
lori appearance of bilateral infiltrate such as pneumonia.

 

Reviewed by: Vanessa Seals MD on 8/12/2021 4:33 PM PDT

Approved by: Vanessa Seals MD on 8/12/2021 4:33 PM PDT

 

 

Station ID:  [id number]

## 2021-08-12 NOTE — CT REPORT
PROCEDURE:  ANGIO NECK W

 

INDICATIONS:  obtunded, ?CVA

 

CONTRAST:  IV CONTRAST: Isovue 300 ml: 80 PO CONTRAST: *NO PO CONTRAST 

 

TECHNIQUE:  

After the administration of intravenous contrast, 1.5 mm axial sections acquired from the aortic arch
 to the [place].  Coronal 3-D maximum intensity projection (MIP) and/or volume rendering ref
ormats were then performed.  For radiation dose reduction, the following was used:  automated exposur
e control, adjustment of mA and/or kV according to patient size.

 

COMPARISON:  None.

 

FINDINGS:  

Image quality:  Excellent.  

 

Carotid system:  The great vessels demonstrate a conventional anatomy as they arise from the aortic a
[facility].  The origins of the common carotid arteries appear patent.  The common carotid arteries demonstr
ate normal calibers and courses.  The bifurcation regions appear normal bilaterally.  The internal ca
rotid arteries demonstrate normal caliber and course.  Mild atherosclerotic plaque noted in both prox
imal internal carotid arteries

 

Posterior circulation:  The origins of the vertebral arteries appear patent.  The more superior porti
ons of the vertebral arteries demonstrate normal course and caliber.  They join to form a normal appe
aring basilar artery.  

 

Soft tissues:  Visualized neck soft tissues demonstrate no suspicious abnormalities.  The thyroid is 
normal in size and there are no incidental findings. Endotracheal tube in place

 

Bones:  No suspicious bony lesions.  Visualized cervical spine appears normally aligned.   

 

IMPRESSION:  

Mild atherosclerotic plaque in both proximal ICA without stenosis utilizing NASCET criteria.

Endotracheal tube in place. Both lung apices clear.

 

The estimate of stenosis included in the report of the imaging study was calculated using the NASCET 
method

 

Reviewed by: Jovany Ly MD on 8/12/2021 5:55 PM ARI

Approved by: Jovany Ly MD on 8/12/2021 5:55 PM AKNEO

 

 

Station ID:  [id number]

## 2021-08-12 NOTE — ED PHYSICIAN DOCUMENTATION
PD HPI FOCAL NEURO





- Stated complaint


Stated Complaint: AMS/GLF





- Chief complaint


Chief Complaint: Neuro





- History obtained from


History obtained from: EMS





- Additional information


Additional information: 





56-year-old woman with chronic MRSA and type 2 diabetes reportedly had a fall in

the shower today but it sounds like she was getting somewhat altered even before

that.  She went to the clinic and was found to have altered mental status and 

referred here for further evaluation and treatment.  On arrival GCS is 6.  No 

history is available from the patient.  Blood sugar prior to arrival was in the 

three hundreds.





Review of Systems


Unable to obtain: AMS





PD PAST MEDICAL HISTORY





- Past Medical History


Cardiovascular: Hypertension, High cholesterol, Other


Respiratory: Pneumonia, Shortness of breath


Neuro: Peripheral neuropathy


Endocrine/Autoimmune: Type 2 diabetes


GI: None


GYN: Other


: None


HEENT: Other


Psych: None


Musculoskeletal: None


Derm: None





- Past Surgical History


Past Surgical History: Yes


General: Other


/GYN: Other





- Present Medications


Home Medications: 


                                Ambulatory Orders











 Medication  Instructions  Recorded  Confirmed


 


Glimepiride [Amaryl] 2 mg PO DAILYWM #30 tablet 03/26/18 06/04/21


 


Gabapentin 1,200 mg PO BID 06/27/18 06/04/21


 


Simvastatin 40 mg PO QPM 06/27/18 06/04/21


 


Gabapentin [Neurontin] 600 mg PO 1200 04/21/21 06/04/21


 


Insulin Aspart [NovoLOG] 2 - 10 unit SUBQ 05/07/21 06/04/21





 0800,1200,1700,2100 #10  


 


Lancets 1 each MC QID #200 each 05/07/21 06/04/21


 


Losartan [Cozaar] 100 mg PO DAILY #60 tablet 05/14/21 06/04/21


 


Furosemide [Lasix] 20 mg PO DAILY #7 tablet 06/02/21 06/04/21


 


Insulin NPH Hum/Reg Insulin Hm 8 unit SUBQ BID #3 each 06/02/21 06/04/21





[Humulin 70/30 Kwikpen]   


 


hydrALAZINE [Apresoline] 25 mg PO QID 30 Days #120 tablet 06/02/21 06/04/21


 


Blood Sugar Diagnostic [Glucometer 1 each MC TID 06/04/21 06/04/21





Strips]   


 


Blood-Glucose Meter [Glucometer] 1 each MC TID 06/04/21 06/04/21


 


Metformin HCl [Metformin  mg PO BID 06/04/21 06/04/21





Gastric]   














- Allergies


Allergies/Adverse Reactions: 


                                    Allergies











Allergy/AdvReac Type Severity Reaction Status Date / Time


 


No Known Drug Allergies Allergy   Verified 06/04/21 13:26














- Social History


Does the pt smoke?: No


Smoking Status: Never smoker


Does the pt drink ETOH?: No


Does the pt have substance abuse?: No





- Immunizations


Immunizations are current?: Yes





- POLST


Patient has POLST: No


POLST Status: DNR





PD ED PE NORMAL





- Vitals


Vital signs reviewed: Yes





- General


General: Other (She is unresponsive with small pupils but they are symmetric, no

evidence of trauma)





- Cardiac


Cardiac: RRR, No murmur





- Respiratory


Respiratory: No respiratory distress, Clear bilaterally





- Abdomen


Abdomen: Normal bowel sounds, Soft, Non tender





- Back


Back: No CVA TTP, No spinal TTP





- Derm


Derm: Normal color, Warm and dry





- Extremities


Extremities: No edema, No calf tenderness / cord





- Neuro


Eye Opening: None


Motor: Withdraws to Pain


Verbal: None


GCS Score: 6





Results





- Vitals


Vitals: 


                               Vital Signs - 24 hr











  08/12/21 08/12/21 08/12/21





  15:51 15:58 16:11


 


Temperature 36.3 C L  


 


Heart Rate 100 109 H 101 H


 


Respiratory 12  17





Rate   


 


Blood Pressure 176/87 H  219/118 H


 


O2 Saturation 99  100














  08/12/21 08/12/21 08/12/21





  16:32 17:42 17:50


 


Temperature   


 


Heart Rate 105 H 99 95


 


Respiratory 16 14 14





Rate   


 


Blood Pressure 215/110 H 194/95 H 190/104 H


 


O2 Saturation 100 100 100














  08/12/21 08/12/21 08/12/21





  18:00 18:25 18:44


 


Temperature   


 


Heart Rate 94 91 90


 


Respiratory 15 15 16





Rate   


 


Blood Pressure 181/87 H 182/80 H 175/86 H


 


O2 Saturation 100 100 100














  08/12/21 08/12/21 08/12/21





  19:00 20:02 20:10


 


Temperature   37.5 C


 


Heart Rate 95 91 91


 


Respiratory 14 15 14





Rate   


 


Blood Pressure 184/99 H 192/86 H 177/84 H


 


O2 Saturation 100 100 100














  08/12/21 08/12/21 08/12/21





  20:32 20:42 20:46


 


Temperature   


 


Heart Rate 91 94 98


 


Respiratory 14 14 15





Rate   


 


Blood Pressure 188/91 H 202/93 H 203/96 H


 


O2 Saturation 100 100 100














  08/12/21 08/12/21 08/12/21





  20:54 20:55 20:58


 


Temperature   


 


Heart Rate 90 91 89


 


Respiratory 16 15 13





Rate   


 


Blood Pressure 198/87 H 165/80 H 169/80 H


 


O2 Saturation 100 100 100














  08/12/21 08/12/21 08/12/21





  21:01 21:11 21:15


 


Temperature   


 


Heart Rate 89 90 90


 


Respiratory 14 18 18





Rate   


 


Blood Pressure 168/84 H 174/88 H 181/81 H


 


O2 Saturation 100 100 99














  08/12/21 08/12/21 08/12/21





  21:30 21:45 22:00


 


Temperature   


 


Heart Rate 90 89 83


 


Respiratory 15 15 15





Rate   


 


Blood Pressure 171/83 H 147/76 H 129/64


 


O2 Saturation 100 100 100














  08/12/21 08/12/21 08/12/21





  22:09 22:15 22:22


 


Temperature   


 


Heart Rate 87 89 88


 


Respiratory 15 15 15





Rate   


 


Blood Pressure 113/66 111/65 111/65


 


O2 Saturation 100 100 100














  08/12/21 08/12/21 08/12/21





  22:30 22:45 23:00


 


Temperature   37.3 C


 


Heart Rate 88 85 85


 


Respiratory 14 15 14





Rate   


 


Blood Pressure 149/73 H 136/74 H 140/70 H


 


O2 Saturation 100 100 100














  08/12/21 08/12/21





  23:15 23:30


 


Temperature  


 


Heart Rate 89 89


 


Respiratory 15 15





Rate  


 


Blood Pressure 170/89 H 170/80 H


 


O2 Saturation 100 100








                                     Oxygen











O2 Source                      Room air

















- EKG (time done)


  ** 1626


Rate: Rate (enter#) (103)


Rhythm: Sinus tachycardia


Axis: Normal


Intervals: Normal OR


QRS: LVH


Ischemia: Non specific changes


Computer interpretation: Agree with computer





- Labs


Labs: 


                                Laboratory Tests











  08/12/21 08/12/21 08/12/21





  15:55 15:55 15:55


 


WBC  8.3  


 


RBC  3.96 L  


 


Hgb  11.4 L  


 


Hct  34.7 L  


 


MCV  87.6  


 


MCH  28.8  


 


MCHC  32.9  


 


RDW  13.4  


 


Plt Count  TNP  


 


MPV  10.8  


 


Neut # (Auto)  5.1  


 


Lymph # (Auto)  2.3  


 


Mono # (Auto)  0.6  


 


Eos # (Auto)  0.2  


 


Baso # (Auto)  0.0  


 


Absolute Nucleated RBC  0.00  


 


Nucleated RBC %  0.0  


 


Platelet Estimate  NORMAL (130-450,000)  


 


Platelet Morphology  PLATELET CLUMPING  


 


RBC Morph Micro Appear  NORMAL APPEARANCE  


 


PT   


 


INR   


 


Bld Gas Analysis Time   


 


Sample Site   


 


ABG pH   


 


ABG pCO2   


 


ABG pO2   


 


ABG HCO3   


 


ABG Total CO2   


 


ABG O2 Saturation   


 


ABG Base Excess   


 


Valentin Test   


 


Respiration Rate   


 


O2 Delivery Device   


 


Vent Mode   


 


FiO2   


 


Tidal Volume   


 


PEEP   


 


Pressure Support Vent   


 


Sodium   137 


 


Potassium   3.7 


 


Chloride   99 L 


 


Carbon Dioxide   26 


 


Anion Gap   12.0 


 


BUN   15 


 


Creatinine   1.3 H 


 


Estimated GFR (MDRD)   51 L 


 


Glucose   297 H 


 


Calcium   9.5 


 


Total Bilirubin   0.6 


 


AST   22 


 


ALT   23 


 


Alkaline Phosphatase   90 


 


Total Protein   8.0 


 


Albumin   3.9 


 


Globulin   4.1 


 


Albumin/Globulin Ratio   1.0 


 


Lipase   22 


 


TSH    0.96


 


Urine Color   


 


Urine Clarity   


 


Urine pH   


 


Ur Specific Gravity   


 


Urine Protein   


 


Urine Glucose (UA)   


 


Urine Ketones   


 


Urine Occult Blood   


 


Urine Nitrite   


 


Urine Bilirubin   


 


Urine Urobilinogen   


 


Ur Leukocyte Esterase   


 


Urine RBC   


 


Urine WBC   


 


Ur Squamous Epith Cells   


 


Urine Bacteria   


 


Ur Microscopic Review   


 


Urine Culture Comments   


 


Nasal Adenovirus (PCR)   


 


Nasal B. parapertussis DNA (PCR)   


 


Nasal Coronavir 229E PCR   


 


Nasal Coronavir HKU1 PCR   


 


Nasal Coronavir NL63 PCR   


 


Nasal Coronavir OC43 PCR   


 


Nasal Enterovir/Rhinovir PCR   


 


Nasal Influenza B PCR   


 


Nasal Influenza A PCR   


 


Nasal Parainfluen 1 PCR   


 


Nasal Parainfluen 2 PCR   


 


Nasal Parainfluen 3 PCR   


 


Nasal Parainfluen 4 PCR   


 


Nasal RSV (PCR)   


 


Nasal B.pertussis DNA PCR   


 


Nasal C.pneumoniae (PCR)   


 


Khan[place] Human Metapneumo PCR   


 


Nasal M.pneumoniae (PCR)   


 


Nasal SARS-CoV-2 (PCR)   


 


Salicylates   < 6.0 


 


Urine Opiates Screen   


 


Ur Oxycodone Screen   


 


Urine Methadone Screen   


 


Ur Propoxyphene Screen   


 


Acetaminophen   < 10 L 


 


Ur Barbiturates Screen   


 


Ur Tricyclics Screen   


 


Ur Phencyclidine Scrn   


 


Ur Amphetamine Screen   


 


U Methamphetamines Scrn   


 


U Benzodiazepines Scrn   


 


Urine Cocaine Screen   


 


U Cannabinoids Screen   


 


Ethyl Alcohol   < 5.0 














  08/12/21 08/12/21 08/12/21





  15:55 16:27 17:10


 


WBC   


 


RBC   


 


Hgb   


 


Hct   


 


MCV   


 


MCH   


 


MCHC   


 


RDW   


 


Plt Count   


 


MPV   


 


Neut # (Auto)   


 


Lymph # (Auto)   


 


Mono # (Auto)   


 


Eos # (Auto)   


 


Baso # (Auto)   


 


Absolute Nucleated RBC   


 


Nucleated RBC %   


 


Platelet Estimate   


 


Platelet Morphology   


 


RBC Morph Micro Appear   


 


PT  13.5 H  


 


INR  1.2  


 


Bld Gas Analysis Time    1717


 


Sample Site    LEFT RADIAL


 


ABG pH    7.52 H


 


ABG pCO2    30 L


 


ABG pO2    127 H


 


ABG HCO3    23.8


 


ABG Total CO2    24.7


 


ABG O2 Saturation    99 H


 


ABG Base Excess    1.9


 


Valentin Test    POSITIVE


 


Respiration Rate    16


 


O2 Delivery Device    VENTILATOR


 


Vent Mode    SIMV


 


FiO2    40.00


 


Tidal Volume    500


 


PEEP    5


 


Pressure Support Vent    10


 


Sodium   


 


Potassium   


 


Chloride   


 


Carbon Dioxide   


 


Anion Gap   


 


BUN   


 


Creatinine   


 


Estimated GFR (MDRD)   


 


Glucose   


 


Calcium   


 


Total Bilirubin   


 


AST   


 


ALT   


 


Alkaline Phosphatase   


 


Total Protein   


 


Albumin   


 


Globulin   


 


Albumin/Globulin Ratio   


 


Lipase   


 


TSH   


 


Urine Color   


 


Urine Clarity   


 


Urine pH   


 


Ur Specific Gravity   


 


Urine Protein   


 


Urine Glucose (UA)   


 


Urine Ketones   


 


Urine Occult Blood   


 


Urine Nitrite   


 


Urine Bilirubin   


 


Urine Urobilinogen   


 


Ur Leukocyte Esterase   


 


Urine RBC   


 


Urine WBC   


 


Ur Squamous Epith Cells   


 


Urine Bacteria   


 


Ur Microscopic Review   


 


Urine Culture Comments   


 


Nasal Adenovirus (PCR)   NOT DETECTED 


 


Nasal B. parapertussis DNA (PCR)   NOT DETECTED 


 


Nasal Coronavir 229E PCR   NOT DETECTED 


 


Nasal Coronavir HKU1 PCR   NOT DETECTED 


 


Nasal Coronavir NL63 PCR   NOT DETECTED 


 


Nasal Coronavir OC43 PCR   NOT DETECTED 


 


Nasal Enterovir/Rhinovir PCR   NOT DETECTED 


 


Nasal Influenza B PCR   NOT DETECTED 


 


Nasal Influenza A PCR   NOT DETECTED 


 


Nasal Parainfluen 1 PCR   NOT DETECTED 


 


Nasal Parainfluen 2 PCR   NOT DETECTED 


 


Nasal Parainfluen 3 PCR   NOT DETECTED 


 


Nasal Parainfluen 4 PCR   NOT DETECTED 


 


Nasal RSV (PCR)   NOT DETECTED 


 


Nasal B.pertussis DNA PCR   NOT DETECTED 


 


Nasal C.pneumoniae (PCR)   NOT DETECTED 


 


Khanh Human Metapneumo PCR   NOT DETECTED 


 


Nasal M.pneumoniae (PCR)   NOT DETECTED 


 


Nasal SARS-CoV-2 (PCR)   NOT DETECTED 


 


Salicylates   


 


Urine Opiates Screen   


 


Ur Oxycodone Screen   


 


Urine Methadone Screen   


 


Ur Propoxyphene Screen   


 


Acetaminophen   


 


Ur Barbiturates Screen   


 


Ur Tricyclics Screen   


 


Ur Phencyclidine Scrn   


 


Ur Amphetamine Screen   


 


U Methamphetamines Scrn   


 


U Benzodiazepines Scrn   


 


Urine Cocaine Screen   


 


U Cannabinoids Screen   


 


Ethyl Alcohol   














  08/12/21





  17:25


 


WBC 


 


RBC 


 


Hgb 


 


Hct 


 


MCV 


 


MCH 


 


MCHC 


 


RDW 


 


Plt Count 


 


MPV 


 


Neut # (Auto) 


 


Lymph # (Auto) 


 


Mono # (Auto) 


 


Eos # (Auto) 


 


Baso # (Auto) 


 


Absolute Nucleated RBC 


 


Nucleated RBC % 


 


Platelet Estimate 


 


Platelet Morphology 


 


RBC Morph Micro Appear 


 


PT 


 


INR 


 


Bld Gas Analysis Time 


 


Sample Site 


 


ABG pH 


 


ABG pCO2 


 


ABG pO2 


 


ABG HCO3 


 


ABG Total CO2 


 


ABG O2 Saturation 


 


ABG Base Excess 


 


Valentin Test 


 


Respiration Rate 


 


O2 Delivery Device 


 


Vent Mode 


 


FiO2 


 


Tidal Volume 


 


PEEP 


 


Pressure Support Vent 


 


Sodium 


 


Potassium 


 


Chloride 


 


Carbon Dioxide 


 


Anion Gap 


 


BUN 


 


Creatinine 


 


Estimated GFR (MDRD) 


 


Glucose 


 


Calcium 


 


Total Bilirubin 


 


AST 


 


ALT 


 


Alkaline Phosphatase 


 


Total Protein 


 


Albumin 


 


Globulin 


 


Albumin/Globulin Ratio 


 


Lipase 


 


TSH 


 


Urine Color  YELLOW


 


Urine Clarity  CLEAR


 


Urine pH  7.5


 


Ur Specific Gravity  1.015


 


Urine Protein  NEGATIVE


 


Urine Glucose (UA)  250 H


 


Urine Ketones  NEGATIVE


 


Urine Occult Blood  SMALL H


 


Urine Nitrite  NEGATIVE


 


Urine Bilirubin  NEGATIVE


 


Urine Urobilinogen  0.2 (NORMAL)


 


Ur Leukocyte Esterase  NEGATIVE


 


Urine RBC  0-5


 


Urine WBC  0-3


 


Ur Squamous Epith Cells  NONE SEEN


 


Urine Bacteria  None Seen


 


Ur Microscopic Review  INDICATED


 


Urine Culture Comments  NOT INDICATED


 


Nasal Adenovirus (PCR) 


 


Nasal B. parapertussis DNA (PCR) 


 


Nasal Coronavir 229E PCR 


 


Nasal Coronavir HKU1 PCR 


 


Nasal Coronavir NL63 PCR 


 


Nasal Coronavir OC43 PCR 


 


Nasal Enterovir/Rhinovir PCR 


 


Nasal Influenza B PCR 


 


Nasal Influenza A PCR 


 


Nasal Parainfluen 1 PCR 


 


Nasal Parainfluen 2 PCR 


 


Nasal Parainfluen 3 PCR 


 


Nasal Parainfluen 4 PCR 


 


Nasal RSV (PCR) 


 


Nasal B.pertussis DNA PCR 


 


Nasal C.pneumoniae (PCR) 


 


Khanh Human Metapneumo PCR 


 


Nasal M.pneumoniae (PCR) 


 


Nasal SARS-CoV-2 (PCR) 


 


Salicylates 


 


Urine Opiates Screen  NEGATIVE


 


Ur Oxycodone Screen  NEGATIVE


 


Urine Methadone Screen  NEGATIVE


 


Ur Propoxyphene Screen  NEGATIVE


 


Acetaminophen 


 


Ur Barbiturates Screen  NEGATIVE


 


Ur Tricyclics Screen  NEGATIVE


 


Ur Phencyclidine Scrn  NEGATIVE


 


Ur Amphetamine Screen  NEGATIVE


 


U Methamphetamines Scrn  NEGATIVE


 


U Benzodiazepines Scrn  NEGATIVE


 


Urine Cocaine Screen  NEGATIVE


 


U Cannabinoids Screen  NEGATIVE


 


Ethyl Alcohol 














- Rads (name of study)


  ** CT Head/Cspine


Radiology: EMP read contemporaneously (NAD)





  ** CTA Head


Radiology: EMP read contemporaneously (Severe stenosis of the right 

perimesencephalic PCA without complete occlusion)





Procedures





- Intubation


Provider: Emergency physician


Medications: Propofol (200mg IVP), Rocuronium (50mg IVP)


Blade: Glidescope, Other (She was a very difficult airway, her airway is very 

anterior.  It took several attempts to get her intubated.)


Tube: Size-enter number (7.0)


Confirmation: Direct visualization, Bilateral breath sounds, End tidal CO2, 

Pulse ox


Complications: No compications





PD MEDICAL DECISION MAKING





- ED course


ED course: 





On arrival given 0.4mg narcan IVP (EMS stock) followed by 1.6mg IVP without 

change.


On arrival she was placed in a c-collar given history of trauma.


Since she still had no response to Narcan and GCS was less than 8 decision was 

made to intubate.  She was a very difficult airway, very very anterior and it 

took several attempts.  She never had desaturation though.





 arrived and we spoke around 5 PM.  She had a stressful day at work 

yesterday but was in her usual state of health this morning around 230 or so she

started staggering like she was drunk but she was not altered like she was 

drunk.  She was just having difficulty walking straight.  He took her to the 

doctor's office which was for another issue, and while there evidently became 

obtunded.  He was not aware of any trauma.





Given the above history and lack of findings on noncontrast head CT, toxicologic

etiologies are still in the differential, but the  has low suspicion for 

that.  Also significantly possible would be acute brainstem CVA.





Given the initial uncertainty in diagnosis and history of trauma TPA was not 

considered.





Spoke with Dr Bradford at [facility] about 5:27pm.





He called [facility], subsequently they called back and asked us to activate 

telestroke.





Spoke with Dr. Andrews, neurologist with telestroke.  He will review the CT 

angiography images.  And he may call [facility] to see if she is an interventional 

candidate given the finding of severe occlusion of the PCA.  This was around 7 

PM.





Dr. Andrews called me back at around 7:45 PM and he has a neuro interventional 

radiologist look at the CT angiography and she is not a code IR/LVO thrombectomy

candidate.





Subsequently attended accepted by Dr. Herman to [facility] at approximately 8:30

PM.  No bed available yet but they will call back with the bed





- Critical Care


Time(min): 55


Time Includes: Direct patient care, Review records, Reassess patient, Document 

care, Coordinate care, Medical consult, Family consult for [facility]


Data interpretation: Labs, Pulse ox


Procedures included in critical care time: Peripheral IV


Procedures excluded from critical care time: Intubation, EKG





Departure





- Departure


Disposition: 02 Transfer Acute Care Hosp


Clinical Impression: 


 Diabetes mellitus type 2, uncontrolled, with complications





Altered mental status


Qualifiers:


 Altered mental status type: coma Coma depth: [place] coma 3-8 Coma timing: in 

the field (EMT or ambulance) Qualified Code(s): R40.2431 - [place] coma scale 

score 3-8, in the field [EMT or ambulance]





Coma


Qualifiers:


 Coma depth: Roscoe coma 3-8 Coma timing: in the field (EMT or ambulance) 

Qualified Code(s): R40.2431 - [place]coe coma scale score 3-8, in the field [EMT or

 ambulance]





Respiratory failure


Qualifiers:


 Chronicity: acute Respiratory failure complication: unspecified whether with 

hypoxia or hypercapnia Qualified Code(s): J96.00 - Acute respiratory failure, 

unspecified whether with hypoxia or hypercapnia





Condition: Critical


Discharge Date/Time: 08/13/21 00:23

## 2021-08-12 NOTE — CT REPORT
PROCEDURE:  CERVICAL SPINE WO

 

INDICATIONS:  Fall, head injury

 

TECHNIQUE:  

Noncontrast 3 mm thick sections acquired from the skull base to the T4 level.  Sagittal and coronal r
eformats were then constructed.  For radiation dose reduction, the following was used:  automated exp
osure control, adjustment of mA and/or kV according to patient size.

 

COMPARISON:  None.

 

FINDINGS:  

Image quality:  Excellent.  

 

Bones:  No fractures or dislocations.  Intervertebral disc spaces are congruent and maintained. Facet
 joint spaces are congruent and bilaterally symmetric. Normal configuration of the craniocervical adriana
ction. Visualized superior ribs are intact.  

 

Soft tissues:  Prevertebral soft tissues are normal in thickness.  No paravertebral hematomas.  No ap
ical pneumothoraces.  

 

IMPRESSION:  

No CT evidence of acute traumatic cervical spine injury.

 

Reviewed by: Erich Fermin MD on 8/12/2021 5:03 PM PDT

Approved by: Erich Fermin MD on 8/12/2021 5:03 PM PDT

 

 

Station ID:  [id number]

## 2021-08-12 NOTE — CT REPORT
PROCEDURE:  ANGIO HEAD W/WO

 

INDICATIONS:  obtunded, ?CVA

 

CONTRAST:  IV CONTRAST: Isovue 300 ml: 80 PO CONTRAST: *NO PO CONTRAST 

 

TECHNIQUE:  

Precontrast 4.5 mm thick angled axial sections acquired from the foramen magnum to the vertex.   Afte
r the administration of intravenous contrast, 1 mm thick sections acquired through the [place] of [place]
is.  Postcontrast 4.5 mm thick sections then re-acquired from the foramen magnum to the vertex.  3-di
mensional maximum-intensity-projection (MIP) and/or volume rendering reformats were acquired of the c
entral intracranial vasculature.  For radiation dose reduction, the following was used:  automated ex
posure control, adjustment of mA and/or kV according to patient size.

 

COMPARISON:  None

 

FINDINGS:  

Image quality:  Excellent.  

 

Anterior circulation:  Intracranial internal carotid arteries are normal in size and flow.  The flow 
within the paired anterior cerebral arteries is normal and symmetric.  The flow within the middle cer
ebral arteries is normal and symmetric.  The anterior communicating artery is seen.  No aneurysms are
 seen.  

 

Posterior circulation:  Visualized portions of the vertebral arteries demonstrate normal caliber, and
 join to form a normal appearing basilar artery.  No aneurysms are seen.  There is a severe stenosis 
in the perimesencephalic right posterior cerebral artery noted without complete occlusion. There is f
low in the distal branches noted.

 

CSF spaces:  Ventricles are normal in size and shape.  Basal cisterns are patent.  No extra-axial flu
id collections.  

 

Brain:  No midline shift.  No intracranial bleeds or masses.  Gray-white matter interface appears int
act.  

 

Skull and face:  Calvarium and facial bones appear intact, without suspicious lesions.  Incidental ri
ght intraocular lens replacements noted.

 

Sinuses:  Visualized sinuses and mastoids are clear.  

 

IMPRESSION:  

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

No evidence of intracranial aneurysm or vascular malformation

 

Reviewed by: Jovany Ly MD on 8/12/2021 5:50 PM AK[facility]

Approved by: Jovany Ly MD on 8/12/2021 5:50 PM AKDT

 

 

Station ID:  [id number]

## 2021-08-12 NOTE — CT REPORT
PROCEDURE:  HEAD WO

 

INDICATIONS:  HEAD INJ

 

TECHNIQUE:  

Noncontrast 4.5 mm thick angled axial sections acquired from the foramen magnum to the vertex.  For r
adiation dose reduction, the following was used:  automated exposure control, adjustment of mA and/or
 kV according to patient size.

 

COMPARISON: 5/14/2021, 5/5/2021 Correlation is made with the accompanying cervical spine CT, 8/12/202
1

 

FINDINGS:  

Image quality:  There is streak artifact seen through the skull base.   

 

CSF spaces:  Basal cisterns are patent.  No extra-axial fluid collections.  Ventricles are normal in 
size and shape.  

 

Brain:  No midline shift.  No intracranial masses or hemorrhage.  Gray-white matter interface is norm
al.  Symmetric calcification of the basal ganglia is seen, which is considered to be within normal li
mits for a patient of this age.

 

Skull and face:  Calvarium and visualized facial bones are intact, without suspicious lesions.  Hyper
ostosis frontalis is incidentally noted, which is not frankly abnormal for a female patient of this a
ge.  

 

Sinuses:  Visualized sinuses and mastoids are clear.  

 

 

 

IMPRESSION:  No intracranial hemorrhage is seen.   

 

No significant intracranial abnormality is seen.  

 

Reviewed by: Tripp Shen MD on 8/12/2021 4:04 PM ARI

Approved by: Tripp Shen MD on 8/12/2021 4:04 PM ARI

 

 

Station ID:  [id number]

## 2022-02-14 ENCOUNTER — HOSPITAL ENCOUNTER (EMERGENCY)
Dept: HOSPITAL 76 - ED | Age: 57
Discharge: HOME | End: 2022-02-14
Payer: SELF-PAY

## 2022-02-14 ENCOUNTER — HOSPITAL ENCOUNTER (OUTPATIENT)
Dept: HOSPITAL 76 - EMS | Age: 57
Discharge: ANOTHER HOSPITAL | End: 2022-02-14
Payer: SELF-PAY

## 2022-02-14 VITALS — SYSTOLIC BLOOD PRESSURE: 158 MMHG | DIASTOLIC BLOOD PRESSURE: 80 MMHG

## 2022-02-14 DIAGNOSIS — Z79.4: ICD-10-CM

## 2022-02-14 DIAGNOSIS — Z86.73: ICD-10-CM

## 2022-02-14 DIAGNOSIS — H81.4: Primary | ICD-10-CM

## 2022-02-14 DIAGNOSIS — E11.42: ICD-10-CM

## 2022-02-14 DIAGNOSIS — R53.1: Primary | ICD-10-CM

## 2022-02-14 DIAGNOSIS — E11.21: ICD-10-CM

## 2022-02-14 DIAGNOSIS — Z79.84: ICD-10-CM

## 2022-02-14 DIAGNOSIS — E11.65: ICD-10-CM

## 2022-02-14 DIAGNOSIS — E87.6: ICD-10-CM

## 2022-02-14 DIAGNOSIS — R42: ICD-10-CM

## 2022-02-14 DIAGNOSIS — I10: ICD-10-CM

## 2022-02-14 DIAGNOSIS — E78.00: ICD-10-CM

## 2022-02-14 LAB
ALBUMIN DIAFP-MCNC: 3.3 G/DL (ref 3.2–5.5)
ALBUMIN/GLOB SERPL: 0.8 {RATIO} (ref 1–2.2)
ALP SERPL-CCNC: 122 IU/L (ref 42–121)
ALT SERPL W P-5'-P-CCNC: 19 IU/L (ref 10–60)
ANION GAP SERPL CALCULATED.4IONS-SCNC: 9 MMOL/L (ref 6–13)
AST SERPL W P-5'-P-CCNC: 19 IU/L (ref 10–42)
BASOPHILS NFR BLD AUTO: 0.1 10^3/UL (ref 0–0.1)
BASOPHILS NFR BLD AUTO: 0.6 %
BILIRUB BLD-MCNC: 0.9 MG/DL (ref 0.2–1)
BUN SERPL-MCNC: 21 MG/DL (ref 6–20)
CALCIUM UR-MCNC: 9.2 MG/DL (ref 8.5–10.3)
CHLORIDE SERPL-SCNC: 102 MMOL/L (ref 101–111)
CO2 SERPL-SCNC: 26 MMOL/L (ref 21–32)
CREAT SERPLBLD-SCNC: 1.9 MG/DL (ref 0.4–1)
EOSINOPHIL # BLD AUTO: 0.3 10^3/UL (ref 0–0.7)
EOSINOPHIL NFR BLD AUTO: 3 %
ERYTHROCYTE [DISTWIDTH] IN BLOOD BY AUTOMATED COUNT: 13 % (ref 12–15)
GFRSERPLBLD MDRD-ARVRAT: 33 ML/MIN/{1.73_M2} (ref 89–?)
GLOBULIN SER-MCNC: 4.2 G/DL (ref 2.1–4.2)
GLUCOSE SERPL-MCNC: 280 MG/DL (ref 70–100)
HCT VFR BLD AUTO: 35.5 % (ref 37–47)
HGB UR QL STRIP: 11.6 G/DL (ref 12–16)
LYMPHOCYTES # SPEC AUTO: 2.4 10^3/UL (ref 1.5–3.5)
LYMPHOCYTES NFR BLD AUTO: 24 %
MAGNESIUM SERPL-MCNC: 2.3 MG/DL (ref 1.7–2.8)
MCH RBC QN AUTO: 28.8 PG (ref 27–31)
MCHC RBC AUTO-ENTMCNC: 32.7 G/DL (ref 32–36)
MCV RBC AUTO: 88.1 FL (ref 81–99)
MONOCYTES # BLD AUTO: 0.9 10^3/UL (ref 0–1)
MONOCYTES NFR BLD AUTO: 9 %
NEUTROPHILS # BLD AUTO: 6.4 10^3/UL (ref 1.5–6.6)
NEUTROPHILS # SNV AUTO: 10.1 X10^3/UL (ref 4.8–10.8)
NEUTROPHILS NFR BLD AUTO: 63.1 %
NRBC # BLD AUTO: 0 /100WBC
NRBC # BLD AUTO: 0 X10^3/UL
PDW BLD AUTO: 11.8 FL (ref 7.9–10.8)
PLAT MORPH BLD: (no result)
PLATELET BLD QL SMEAR: (no result)
POTASSIUM SERPL-SCNC: 3 MMOL/L (ref 3.5–5)
PROT SPEC-MCNC: 7.5 G/DL (ref 6.7–8.2)
RBC MAR: 4.03 10^6/UL (ref 4.2–5.4)
RBC MORPH BLD: (no result)
SODIUM SERPLBLD-SCNC: 137 MMOL/L (ref 135–145)

## 2022-02-14 PROCEDURE — 36415 COLL VENOUS BLD VENIPUNCTURE: CPT

## 2022-02-14 PROCEDURE — 85025 COMPLETE CBC W/AUTO DIFF WBC: CPT

## 2022-02-14 PROCEDURE — 99284 EMERGENCY DEPT VISIT MOD MDM: CPT

## 2022-02-14 PROCEDURE — 70551 MRI BRAIN STEM W/O DYE: CPT

## 2022-02-14 PROCEDURE — 80053 COMPREHEN METABOLIC PANEL: CPT

## 2022-02-14 PROCEDURE — 83735 ASSAY OF MAGNESIUM: CPT

## 2022-02-14 PROCEDURE — 93005 ELECTROCARDIOGRAM TRACING: CPT

## 2022-02-14 NOTE — ED PHYSICIAN DOCUMENTATION
PD HPI FOCAL NEURO





- Stated complaint


Stated Complaint: DIZZY/CAN'T STAND





- Chief complaint


Chief Complaint: General





- History obtained from


History obtained from: Patient





- Additional information


Additional information: 





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 

hypertension, chronic MRSA, ischemic stroke who presented to her physician's 

office today with 2 days of vertigo.  States she has spinning dizziness only 

while she is trying to walk.  When she is supine or sitting she does not have 

it.  It makes it difficult to walk but she still can walk.  There is no 

associated headache or other focal neurologic symptoms.  It is associated with 

some tunnel vision and kaleidoscope's in her vision she states.  At the doctor's

office her blood sugar was about 500.  She states she is compliant with her 

medication is not sure why her blood sugar is so high.  Of note I saw her in 

August of last year with respiratory failure and decreased mental status.  She 

required intubation which of note was difficult due to anatomy and she was sent 

to [facility].  The outcome is not completely known, but since her physician's 

office continues to carry the diagnosis of ischemic stroke, presume that was the

issue then.





Review of Systems


Ten Systems: 10 systems reviewed and negative


Constitutional: denies: Fever, Chills, Myalgias


Eyes: reports: Decreased vision


Cardiac: denies: Chest pain / pressure, Palpitations


Respiratory: denies: Dyspnea, Cough


GI: denies: Abdominal Pain, Nausea, Vomiting





PD PAST MEDICAL HISTORY





- Past Medical History


Cardiovascular: Hypertension, High cholesterol, Other


Respiratory: Pneumonia, Shortness of breath


Neuro: Peripheral neuropathy


Endocrine/Autoimmune: Type 2 diabetes


GI: None


GYN: Other


: None


HEENT: Other


Psych: None


Musculoskeletal: None


Derm: None





- Past Surgical History


Past Surgical History: Yes


General: Other


/GYN: Other





- Present Medications


Home Medications: 


                                Ambulatory Orders











 Medication  Instructions  Recorded  Confirmed


 


Glimepiride [Amaryl] 2 mg PO DAILYWM #30 tablet 03/26/18 06/04/21


 


Gabapentin 1,200 mg PO BID 06/27/18 06/04/21


 


Simvastatin 40 mg PO QPM 06/27/18 06/04/21


 


Gabapentin [Neurontin] 600 mg PO 1200 04/21/21 06/04/21


 


Insulin Aspart [NovoLOG] 2 - 10 unit SUBQ 05/07/21 06/04/21





 0800,1200,1700,2100 #10  


 


Lancets 1 each MC QID #200 each 05/07/21 06/04/21


 


Losartan [Cozaar] 100 mg PO DAILY #60 tablet 05/14/21 06/04/21


 


Furosemide [Lasix] 20 mg PO DAILY #7 tablet 06/02/21 06/04/21


 


Insulin NPH Hum/Reg Insulin Hm 8 unit SUBQ BID #3 each 06/02/21 06/04/21





[Humulin 70/30 Kwikpen]   


 


hydrALAZINE [Apresoline] 25 mg PO QID 30 Days #120 tablet 06/02/21 06/04/21


 


Blood Sugar Diagnostic [Glucometer 1 each MC TID 06/04/21 06/04/21





Strips]   


 


Blood-Glucose Meter [Glucometer] 1 each  TID 06/04/21 06/04/21


 


Metformin HCl [Metformin  mg PO BID 06/04/21 06/04/21





Gastric]   


 


Meclizine HCl [Motion Sickness] 25 mg PO Q6H PRN #20 tablet 02/14/22 














- Allergies


Allergies/Adverse Reactions: 


                                    Allergies











Allergy/AdvReac Type Severity Reaction Status Date / Time


 


No Known Drug Allergies Allergy   Verified 02/14/22 13:19














- Social History


Does the pt smoke?: No


Smoking Status: Never smoker


Does the pt drink ETOH?: No


Does the pt have substance abuse?: No





- Immunizations


Immunizations are current?: Yes





- POLST


Patient has POLST: No


POLST Status: DNR





PD ED PE NORMAL





- Vitals


Vital signs reviewed: Yes





- General


General: Alert and oriented X 3, No acute distress





- HEENT


HEENT: PERRL, EOMI





- Neck


Neck: Supple, no meningeal sign, No bony TTP





- Cardiac


Cardiac: RRR, No murmur





- Respiratory


Respiratory: No respiratory distress, Clear bilaterally





- Abdomen


Abdomen: Normal bowel sounds, Soft, Non tender





- Back


Back: No CVA TTP, No spinal TTP





- Derm


Derm: Normal color, Warm and dry





- Extremities


Extremities: No edema, No calf tenderness / cord





- Neuro


Neuro: Alert and oriented X 3, CNs 2-12 intact, Normal speech


Eye Opening: Spontaneous


Motor: Obeys Commands


Verbal: Oriented


GCS Score: 15





NIHSS





- Time


Time: 13:15





- Level of Consciousness


Level of consciousness: (0) Alert, Keenly responsive


LOC Questions: (0) Answers both Q's correct


LOC Commands: (0) Performs both correctly





- Gaze


Best Gaze: (0) Normal





- Visual


Visual: (1) Partial hemianopia (She has difficulty with peripheral vision in the

left eye to the left side.  She states this is chronic and due to an underlying 

eye issue.)





- Facial Palsy


Facial Palsy: (0) Normal, symmetrical movement





- Motor Arms (both separate)


Motor Arm (right): (0) No drift


Motor Arm (left): (0) No drift





- Motor Legs (both separate)


Motor Leg (right): (0) No drift


Motor Leg (left): (0) No drift





- Limb Ataxia


Limb Ataxia: (0) Absent





- Sensory


Sensory: (0) Normal





- Best Language


Best Language: (0) No aphasia





- Dysarthria


Dysarthria: (0) Normal





- Extinction and Inattention (formally neg


Extinction and inattention: (0) No abnormality





- Total Score/Results


Total Score/Result: 1





Results





- Vitals


Vitals: 


                               Vital Signs - 24 hr











  02/14/22 02/14/22





  13:16 13:21


 


Temperature 36.3 C L 


 


Heart Rate 78 74


 


Respiratory 16 16





Rate  


 


Blood Pressure 142/75 H 142/75 H


 


O2 Saturation 100 100








                                     Oxygen











O2 Source                      Room air

















- EKG (time done)


  ** 1335


Rate: Rate (enter#) (74)


Rhythm: NSR


Intervals: Prolonged MD


QRS: LVH


Ischemia: Non specific changes





- Labs


Labs: 


                                Laboratory Tests











  02/14/22 02/14/22





  14:05 14:05


 


WBC  10.1 


 


RBC  4.03 L 


 


Hgb  11.6 L 


 


Hct  35.5 L 


 


MCV  88.1 


 


MCH  28.8 


 


MCHC  32.7 


 


RDW  13.0 


 


Plt Count   


 


MPV  11.8 H 


 


Neut # (Auto)  6.4 


 


Lymph # (Auto)  2.4 


 


Mono # (Auto)  0.9 


 


Eos # (Auto)  0.3 


 


Baso # (Auto)  0.1 


 


Absolute Nucleated RBC  0.00 


 


Nucleated RBC %  0.0 


 


Manual Slide Review  Indicated 


 


Platelet Estimate  NORMAL (130-450,000) 


 


Platelet Morphology  PLATELET CLUMPING 


 


RBC Morph Micro Appear  NORMAL APPEARANCE 


 


Sodium   137


 


Potassium   3.0 L


 


Chloride   102


 


Carbon Dioxide   26


 


Anion Gap   9.0


 


BUN   21 H


 


Creatinine   1.9 H


 


Estimated GFR (MDRD)   33 L


 


Glucose   280 H


 


Calcium   9.2


 


Magnesium   2.3


 


Total Bilirubin   0.9


 


AST   19


 


ALT   19


 


Alkaline Phosphatase   122 H


 


Total Protein   7.5


 


Albumin   3.3


 


Globulin   4.2


 


Albumin/Globulin Ratio   0.8 L














PD MEDICAL DECISION MAKING





- ED course


ED course: 





This is a 57-year-old woman with multiple risk factors for stroke who presents 

with vertigo not clearly peripheral more clearly central.  Work-up demonstrates 

a normal MRI without evidence of recent stroke or insult or tumor.  Labs are 

notable for slowly worsening renal function which I suspect is related to 

diabetic nephropathy more than an acute issue and close follow-up was advised 

and she was given a oral potassium supplement for her potassium of 3.0.  She was

feeling completely better after administration of oral meclizine for her vertigo

here.





Departure





- Departure


Disposition: 01 Home, Self Care


Clinical Impression: 


 Diabetes mellitus type 2, uncontrolled, with complications, Vertigo





Condition: Good


Record reviewed to determine appropriate education?: Yes


Instructions:  ED Vertigo Unspecified


Prescriptions: 


Meclizine HCl [Motion Sickness] 25 mg PO Q6H PRN #20 tablet


 PRN Reason: Dizziness


Comments: 


I sent your prescription electronically to [facility] in [place].





As discussed your MRI scan of your head did not show any evidence of recent 

stroke or tumor or anything of that ilk so suspect your vertigo is from an inner

ear problem.  Glad we are since feeling better after the meclizine.  We did note

today that your potassium was a bit low and gave you an oral potassium 

supplement for that and your kidney function is slowly declining which needs to 

be followed by your doctor, the most important thing there would be excellent 

blood glucose control.  Return for new or worsening symptoms.  Follow-up with 

your doctor, next available appointment.

## 2022-02-14 NOTE — MRI REPORT
PROCEDURE:  Brain W/O

 

INDICATIONS:  vertigo, mult risk factors

 

TECHNIQUE:  

Noncontrast axial T1 spin echo, axial T2 fast spin echo, sagittal and axial FLAIR, coronal T2 fast sp
in echo, axial gradient echo, axial diffusion and ADC through the brain.  

 

COMPARISON:  Correlation is made with head CT and head and neck CT angiograms, 8/12/2021

 

FINDINGS:  

Image quality:   Motion artifact is noted.   

 

CSF Spaces:  Basal cisterns are patent.  No extra-axial fluid collections.  Ventricles are normal in 
size and shape.  

 

Brain:  No intracranial masses or hemorrhage.  Gray/white matter interface is normal.  Brainstem appe
ars normal.  Diffusion-weighted images demonstrate no acute ischemic insult.  No chronic ischemic ins
ults.  Normal intravascular flow voids are present.  Age-appropriate brain parenchymal volume loss an
d chronic small vessel ischemic change can be seen.  

 

In this patient with this given history, scrutiny is given to the cerebellopontine angle cisterns and
 to the internal auditory canals. To the limits of this standard protocol study, no masses can be see
n within these regions. 

 

Skull and face:  Calvarium has normal marrow signal.  Orbits appear normal.  A right lens replacement
 can be seen.

 

Sinuses:  Sinuses and mastoids are clear.  

 

 

 

IMPRESSION:  

 

No imaging explanation is found for the patient's presenting symptoms.

 

To the limits of this standard protocol study performed without contrast, no masses are seen within t
he cerebellopontine angle cisterns or the internal auditory canals.

 

Age-appropriate brain parenchymal volume loss and chronic small vessel ischemic change can be seen.  


 

Reviewed by: Tripp Shen MD on 2/14/2022 2:35 PM AK

Approved by: Tripp Shen MD on 2/14/2022 2:35 PM AK

 

 

Station ID:  [id number]

## 2023-05-27 ENCOUNTER — HOSPITAL ENCOUNTER (EMERGENCY)
Dept: HOSPITAL 76 - ED | Age: 58
Discharge: HOME | End: 2023-05-27
Payer: SELF-PAY

## 2023-05-27 VITALS — SYSTOLIC BLOOD PRESSURE: 209 MMHG | DIASTOLIC BLOOD PRESSURE: 87 MMHG

## 2023-05-27 DIAGNOSIS — I10: ICD-10-CM

## 2023-05-27 DIAGNOSIS — E11.42: ICD-10-CM

## 2023-05-27 DIAGNOSIS — Z79.4: ICD-10-CM

## 2023-05-27 DIAGNOSIS — E78.00: ICD-10-CM

## 2023-05-27 DIAGNOSIS — Z79.899: ICD-10-CM

## 2023-05-27 DIAGNOSIS — J18.9: Primary | ICD-10-CM

## 2023-05-27 DIAGNOSIS — Z79.84: ICD-10-CM

## 2023-05-27 LAB
ANION GAP SERPL CALCULATED.4IONS-SCNC: 7 MMOL/L (ref 6–13)
BASOPHILS NFR BLD AUTO: 0.1 10^3/UL (ref 0–0.1)
BASOPHILS NFR BLD AUTO: 0.7 %
BUN SERPL-MCNC: 15 MG/DL (ref 6–20)
CALCIUM UR-MCNC: 8.9 MG/DL (ref 8.5–10.3)
CHLORIDE SERPL-SCNC: 105 MMOL/L (ref 101–111)
CO2 SERPL-SCNC: 29 MMOL/L (ref 21–32)
CREAT SERPLBLD-SCNC: 1.1 MG/DL (ref 0.4–1)
EOSINOPHIL # BLD AUTO: 0.7 10^3/UL (ref 0–0.7)
EOSINOPHIL NFR BLD AUTO: 7.5 %
ERYTHROCYTE [DISTWIDTH] IN BLOOD BY AUTOMATED COUNT: 15.3 % (ref 12–15)
GFRSERPLBLD MDRD-ARVRAT: 62 ML/MIN/{1.73_M2} (ref 89–?)
GLUCOSE SERPL-MCNC: 204 MG/DL (ref 70–100)
HCT VFR BLD AUTO: 36.4 % (ref 37–47)
HGB UR QL STRIP: 10.8 G/DL (ref 12–16)
LYMPHOCYTES # SPEC AUTO: 2.6 10^3/UL (ref 1.5–3.5)
LYMPHOCYTES NFR BLD AUTO: 28.1 %
MCH RBC QN AUTO: 28.1 PG (ref 27–31)
MCHC RBC AUTO-ENTMCNC: 29.7 G/DL (ref 32–36)
MCV RBC AUTO: 94.5 FL (ref 81–99)
MONOCYTES # BLD AUTO: 0.7 10^3/UL (ref 0–1)
MONOCYTES NFR BLD AUTO: 7.2 %
NEUTROPHILS # BLD AUTO: 5.1 10^3/UL (ref 1.5–6.6)
NEUTROPHILS # SNV AUTO: 9.1 X10^3/UL (ref 4.8–10.8)
NEUTROPHILS NFR BLD AUTO: 56.2 %
NRBC # BLD AUTO: 0 /100WBC
NRBC # BLD AUTO: 0 X10^3/UL
PDW BLD AUTO: 10.3 FL (ref 7.9–10.8)
PLATELET # BLD: 232 10^3/UL (ref 130–450)
POTASSIUM SERPL-SCNC: 3.3 MMOL/L (ref 3.5–5)
RBC MAR: 3.85 10^6/UL (ref 4.2–5.4)
SODIUM SERPLBLD-SCNC: 141 MMOL/L (ref 135–145)

## 2023-05-27 PROCEDURE — 80048 BASIC METABOLIC PNL TOTAL CA: CPT

## 2023-05-27 PROCEDURE — 36415 COLL VENOUS BLD VENIPUNCTURE: CPT

## 2023-05-27 PROCEDURE — 99284 EMERGENCY DEPT VISIT MOD MDM: CPT

## 2023-05-27 PROCEDURE — 94640 AIRWAY INHALATION TREATMENT: CPT

## 2023-05-27 PROCEDURE — 84443 ASSAY THYROID STIM HORMONE: CPT

## 2023-05-27 PROCEDURE — 85025 COMPLETE CBC W/AUTO DIFF WBC: CPT

## 2023-05-27 PROCEDURE — 93005 ELECTROCARDIOGRAM TRACING: CPT

## 2023-05-27 PROCEDURE — 71045 X-RAY EXAM CHEST 1 VIEW: CPT

## 2023-05-27 NOTE — XRAY REPORT
PROCEDURE:  Chest 1 View X-Ray

 

INDICATIONS:  SOA

 

TECHNIQUE:  One view of the chest was acquired.  

 

COMPARISON:  8/12/2021

 

FINDINGS:  

 

Surgical changes and devices:  None.  

 

Lungs and pleura:  Hazy bilateral infrahilar alveolar opacities. Possible small pleural effusions. No
 pneumothorax.

 

Mediastinum:  Heart size at the upper limits of normal. Mild central vascular prominence. Normal aort
ic contour.

 

Bones and chest wall:  No suspicious bony lesions.  Overlying soft tissues appear unremarkable.  

 

 

IMPRESSION:  

 

 

1. Bibasilar alveolar opacities may be secondary to infection, aspiration, or edema.

2. Mild central vascular prominence may indicate mild CHF or volume overload. 

 

 

 

Reviewed by: Alessandra George MD on 5/27/2023 1:47 PM ARI

Approved by: Alessandra George MD on 5/27/2023 1:47 PM AKDT

 

 

Station ID:  [id number]

## 2023-05-27 NOTE — ED PHYSICIAN DOCUMENTATION
PD HPI CHEST PAIN





- Stated complaint


Stated Complaint: SOA/SWEATING





- Chief complaint


Chief Complaint: Resp





- History obtained from


History obtained from: Patient





- Additional information


Additional information: 





58-year-old woman with hypertension and diabetes presents with chest congestion 

and nonproductive cough for 4 days with shortness of breath.  No measured fevers

but feels hot and cold.  She does note weight gain but over about 6 months.





PD PAST MEDICAL HISTORY





- Past Medical History


Cardiovascular: Hypertension, High cholesterol, Other


Respiratory: Pneumonia, Shortness of breath


Neuro: Peripheral neuropathy


Endocrine/Autoimmune: Type 2 diabetes


GI: None


GYN: Other


: None


HEENT: Other


Psych: None


Musculoskeletal: None


Derm: None





- Past Surgical History


Past Surgical History: Yes


General: Other


/GYN: Other





- Present Medications


Home Medications: 


                                Ambulatory Orders











 Medication  Instructions  Recorded  Confirmed


 


Glimepiride [Amaryl] 2 mg PO DAILYWM #30 tablet 03/26/18 06/04/21


 


Gabapentin 1,200 mg PO BID 06/27/18 06/04/21


 


Simvastatin 40 mg PO QPM 06/27/18 06/04/21


 


Gabapentin [Neurontin] 600 mg PO 1200 04/21/21 06/04/21


 


Insulin Aspart [NovoLOG] 2 - 10 unit SUBQ 05/07/21 06/04/21





 0800,1200,1700,2100 #10  


 


Lancets 1 each  QID #200 each 05/07/21 06/04/21


 


Losartan [Cozaar] 100 mg PO DAILY #60 tablet 05/14/21 06/04/21


 


Furosemide [Lasix] 20 mg PO DAILY #7 tablet 06/02/21 06/04/21


 


Insulin NPH Hum/Reg Insulin Hm 8 unit SUBQ BID #3 each 06/02/21 06/04/21





[Humulin 70/30 Kwikpen]   


 


hydrALAZINE [Apresoline] 25 mg PO QID 30 Days #120 tablet 06/02/21 06/04/21


 


Blood Sugar Diagnostic [Glucometer 1 each  TID 06/04/21 06/04/21





Strips]   


 


Blood-Glucose Meter [Glucometer] 1 each  TID 06/04/21 06/04/21


 


Metformin HCl [Metformin  mg PO BID 06/04/21 06/04/21





Gastric]   


 


Meclizine HCl [Motion Sickness] 25 mg PO Q6H PRN #20 tablet 02/14/22 


 


Albuterol Sulf [Ventolin Hfa 1 - 2 puffs INH Q4HR PRN #1 each 05/27/23 





Inhaler]   


 


Doxycycline [Vibramycin] 100 mg PO BID #14 tablet 05/27/23 


 


guaiFENesin/CODEINE [Robitussin AC] 5 - 10 ml PO Q6H PRN #120 ml 05/27/23 














- Allergies


Allergies/Adverse Reactions: 


                                    Allergies











Allergy/AdvReac Type Severity Reaction Status Date / Time


 


No Known Drug Allergies Allergy   Verified 02/14/22 13:19














- Social History


Does the pt smoke?: No


Smoking Status: Never smoker


Does the pt drink ETOH?: No


Does the pt have substance abuse?: No





- Immunizations


Immunizations are current?: Yes





- POLST


Patient has POLST: No


POLST Status: DNR





PD ED PE NORMAL





- Vitals


Vital signs reviewed: Yes





- General


General: Alert and oriented X 3, No acute distress





- Cardiac


Cardiac: RRR, No murmur





- Respiratory


Respiratory: No respiratory distress, Clear bilaterally





- Extremities


Extremities: No edema, No calf tenderness / cord





- Neuro


Neuro: Alert and oriented X 3, Normal speech





Results





- Vitals


Vitals: 


                               Vital Signs - 24 hr











  05/27/23 05/27/23 05/27/23





  14:05 14:59 15:38


 


Temperature  36.8 C 


 


Heart Rate 74  78


 


Respiratory 18  18





Rate   


 


Blood Pressure 200/78 H  


 


O2 Saturation 95  








                                     Oxygen











O2 Source                      Room air

















- EKG (time done)


  ** 1448


EKG releavant findings:: 


EKG personally interpreted by author of this note. Relevant findings are: 





Rate: Rate (enter#) (76)


Rhythm: NSR


Axis: Normal


Intervals: Normal MN, Prolonged QT (Borderline at 503 corrected)


QRS: Normal


Ischemia: Normal ST segments





- Labs


Labs: 


                                Laboratory Tests











  05/27/23 05/27/23 05/27/23





  14:56 14:56 14:56


 


WBC  9.1  


 


RBC  3.85 L  


 


Hgb  10.8 L  


 


Hct  36.4 L  


 


MCV  94.5  


 


MCH  28.1  


 


MCHC  29.7 L  


 


RDW  15.3 H  


 


Plt Count  232  


 


MPV  10.3  


 


Neut # (Auto)  5.1  


 


Lymph # (Auto)  2.6  


 


Mono # (Auto)  0.7  


 


Eos # (Auto)  0.7  


 


Baso # (Auto)  0.1  


 


Absolute Nucleated RBC  0.00  


 


Nucleated RBC %  0.0  


 


Sodium   141 


 


Potassium   3.3 L 


 


Chloride   105 


 


Carbon Dioxide   29 


 


Anion Gap   7.0 


 


BUN   15 


 


Creatinine   1.1 H 


 


Estimated GFR (MDRD)   62 L 


 


Glucose   204 H 


 


Calcium   8.9 


 


TSH    3.28














PD Medical Decision Making





- ED course


ED course: 





58-year-old woman presents with cough, congestion, shortness of breath.  On 

initial exam her lungs were clear, she did develop some wheezing while in the 

department was administered albuterol with partial relief.  Her chest x-ray is 

suggestive of an atypical pneumonia which I am treating with doxycycline.





Departure





- Departure


Disposition: 01 Home, Self Care


Clinical Impression: 


 Atypical pneumonia





Condition: Good


Record reviewed to determine appropriate education?: Yes


Instructions:  Pneumonia Dc


Prescriptions: 


Albuterol Sulf [Ventolin Hfa Inhaler] 1 - 2 puffs INH Q4HR PRN #1 each


 PRN Reason: Shortness Of Air/Wheezing


guaiFENesin/CODEINE [Robitussin AC] 5 - 10 ml PO Q6H PRN #120 ml


 PRN Reason: Cough


Doxycycline [Vibramycin] 100 mg PO BID #14 tablet


Comments: 


I sent your prescription electronically to [facility] in [place].





Call your doctor to arrange a follow-up appointment, make the next available 

appointment.  In the interim, return anytime if worse or if new symptoms 

develop.


Forms:  Activity restrictions

## 2023-10-05 ENCOUNTER — HOSPITAL ENCOUNTER (OUTPATIENT)
Dept: HOSPITAL 76 - LAB.N | Age: 58
Discharge: HOME | End: 2023-10-05
Attending: PHYSICIAN ASSISTANT
Payer: COMMERCIAL

## 2023-10-05 DIAGNOSIS — E78.5: ICD-10-CM

## 2023-10-05 DIAGNOSIS — E11.42: ICD-10-CM

## 2023-10-05 DIAGNOSIS — I10: Primary | ICD-10-CM

## 2023-10-05 DIAGNOSIS — D64.9: ICD-10-CM

## 2023-10-05 LAB
ALBUMIN DIAFP-MCNC: 4 G/DL (ref 3.2–5.5)
ALBUMIN/GLOB SERPL: 1 {RATIO} (ref 1–2.2)
ALP SERPL-CCNC: 163 IU/L (ref 42–121)
ALT SERPL W P-5'-P-CCNC: 16 IU/L (ref 10–60)
ANION GAP SERPL CALCULATED.4IONS-SCNC: 6 MMOL/L (ref 6–13)
AST SERPL W P-5'-P-CCNC: 13 IU/L (ref 10–42)
BASOPHILS NFR BLD AUTO: 0.1 10^3/UL (ref 0–0.1)
BASOPHILS NFR BLD AUTO: 0.7 %
BILIRUB BLD-MCNC: 0.6 MG/DL (ref 0.2–1)
BUN SERPL-MCNC: 14 MG/DL (ref 6–20)
CALCIUM UR-MCNC: 9.7 MG/DL (ref 8.5–10.3)
CHLORIDE SERPL-SCNC: 100 MMOL/L (ref 101–111)
CHOLEST SERPL-MCNC: 297 MG/DL
CO2 SERPL-SCNC: 33 MMOL/L (ref 21–32)
CREAT SERPLBLD-SCNC: 0.8 MG/DL (ref 0.6–1.3)
CREAT UR-SCNC: 34.2 MG/DL
EOSINOPHIL # BLD AUTO: 0.4 10^3/UL (ref 0–0.7)
EOSINOPHIL NFR BLD AUTO: 4.7 %
ERYTHROCYTE [DISTWIDTH] IN BLOOD BY AUTOMATED COUNT: 14.4 % (ref 12–15)
EST. AVERAGE GLUCOSE BLD GHB EST-MCNC: 352 MG/DL (ref 70–100)
GFRSERPLBLD MDRD-ARVRAT: 89 ML/MIN/{1.73_M2} (ref 89–?)
GLOBULIN SER-MCNC: 3.9 G/DL (ref 2.1–4.2)
GLUCOSE SERPL-MCNC: 256 MG/DL (ref 74–104)
HBA1C MFR BLD HPLC: 13.9 % (ref 4.27–6.07)
HCT VFR BLD AUTO: 38.3 % (ref 37–47)
HDLC SERPL-MCNC: 46 MG/DL
HDLC SERPL: 6.5 {RATIO} (ref ?–4.4)
HGB UR QL STRIP: 12 G/DL (ref 12–16)
LDLC SERPL CALC-MCNC: 199 MG/DL
LDLC/HDLC SERPL: 4.3 {RATIO} (ref ?–4.4)
LYMPHOCYTES # SPEC AUTO: 2.4 10^3/UL (ref 1.5–3.5)
LYMPHOCYTES NFR BLD AUTO: 31.5 %
MCH RBC QN AUTO: 28.2 PG (ref 27–31)
MCHC RBC AUTO-ENTMCNC: 31.3 G/DL (ref 32–36)
MCV RBC AUTO: 90.1 FL (ref 81–99)
MICROALBUMIN UR-MCNC: 8.8 MG/DL
MICROALBUMIN/CREAT RATIO PNL UR: 257.3 UG/MG (ref ?–30)
MONOCYTES # BLD AUTO: 0.5 10^3/UL (ref 0–1)
MONOCYTES NFR BLD AUTO: 6.3 %
NEUTROPHILS # BLD AUTO: 4.3 10^3/UL (ref 1.5–6.6)
NEUTROPHILS # SNV AUTO: 7.6 X10^3/UL (ref 4.8–10.8)
NEUTROPHILS NFR BLD AUTO: 56.4 %
NRBC # BLD AUTO: 0 /100WBC
NRBC # BLD AUTO: 0 X10^3/UL
PDW BLD AUTO: 12.2 FL (ref 7.9–10.8)
PLATELET # BLD: 202 10^3/UL (ref 130–450)
POTASSIUM SERPL-SCNC: 3.4 MMOL/L (ref 3.5–4.5)
PROT SPEC-MCNC: 7.9 G/DL (ref 6.4–8.9)
RBC MAR: 4.25 10^6/UL (ref 4.2–5.4)
SODIUM SERPLBLD-SCNC: 139 MMOL/L (ref 135–145)
TRIGL P FAST SERPL-MCNC: 260 MG/DL (ref 48–352)
TSH SERPL-ACNC: 2.52 UIU/ML (ref 0.34–5.6)
VLDLC SERPL-SCNC: 52 MG/DL

## 2023-10-05 PROCEDURE — 82570 ASSAY OF URINE CREATININE: CPT

## 2023-10-05 PROCEDURE — 80053 COMPREHEN METABOLIC PANEL: CPT

## 2023-10-05 PROCEDURE — 80061 LIPID PANEL: CPT

## 2023-10-05 PROCEDURE — 83721 ASSAY OF BLOOD LIPOPROTEIN: CPT

## 2023-10-05 PROCEDURE — 85025 COMPLETE CBC W/AUTO DIFF WBC: CPT

## 2023-10-05 PROCEDURE — 82043 UR ALBUMIN QUANTITATIVE: CPT

## 2023-10-05 PROCEDURE — 83036 HEMOGLOBIN GLYCOSYLATED A1C: CPT

## 2023-10-05 PROCEDURE — 36415 COLL VENOUS BLD VENIPUNCTURE: CPT

## 2023-10-05 PROCEDURE — 84443 ASSAY THYROID STIM HORMONE: CPT
